# Patient Record
Sex: FEMALE | Race: WHITE | NOT HISPANIC OR LATINO | Employment: UNEMPLOYED | URBAN - NONMETROPOLITAN AREA
[De-identification: names, ages, dates, MRNs, and addresses within clinical notes are randomized per-mention and may not be internally consistent; named-entity substitution may affect disease eponyms.]

---

## 2017-01-01 ENCOUNTER — HOSPITAL ENCOUNTER (OUTPATIENT)
Dept: SPEECH THERAPY | Facility: HOSPITAL | Age: 42
Setting detail: THERAPIES SERIES
Discharge: HOME | End: 2017-01-20
Attending: PHYSICAL MEDICINE & REHABILITATION | Admitting: PHYSICAL MEDICINE & REHABILITATION

## 2017-01-24 ENCOUNTER — TRANSCRIBE ORDERS (OUTPATIENT)
Dept: SPEECH THERAPY | Facility: HOSPITAL | Age: 42
End: 2017-01-24

## 2017-01-24 DIAGNOSIS — R41.844 FRONTAL LOBE AND EXECUTIVE FUNCTION DEFICIT: Primary | ICD-10-CM

## 2017-02-08 ENCOUNTER — HOSPITAL ENCOUNTER (OUTPATIENT)
Dept: SPEECH THERAPY | Facility: HOSPITAL | Age: 42
Setting detail: THERAPIES SERIES
Discharge: HOME | End: 2017-02-08

## 2017-02-08 DIAGNOSIS — R41.841 COGNITIVE COMMUNICATION DEFICIT: Primary | ICD-10-CM

## 2017-02-08 PROCEDURE — 92507 TX SP LANG VOICE COMM INDIV: CPT | Performed by: SPEECH-LANGUAGE PATHOLOGIST

## 2017-02-08 NOTE — PROGRESS NOTES
Outpatient Speech Language Pathology   Adult Speech Language Cognitive Treatment Note  [facility]     Patient Name: Christal Holder  : 1975  MRN: [id number]  Today's Date: 2017         Visit Date: 2017   There is no problem list on file for this patient.    Pt is making consistent progress towards all LTG and STGs. 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 Continue PO to target memory and attention goals as stated.        Visit Dx:    ICD-10-CM ICD-9-CM   1. Cognitive communication deficit R41.841 799.52                               SLP OP Goals       17 1427 17 1020 17 0900    Goal Type Needed    Goal Type Needed  Memory;Attention/Orientation  -EA Memory;Attention/Orientation;Other Adult Goals  -EA    Subjective Comments    Subjective Comments  Pt attended therapy session alone this date. Pt was alert and active in all therapy activities. Pt stated that she continues to struggle remembering to complete tasks at home. Schedule/chart was recommended this date as well as phone calendar and reminders.,   -EA     Subjective Pain    Able to rate subjective pain?  yes  -EA     Pre-Treatment Pain Level  0  -EA     Post-Treatment Pain Level  0  -EA     Memory Goals    Memory LTG's  Patient and family will implement compensatory strategies to maximize patient’s Memory function so patient can continue to participate in daily activities  -EA Patient and family will implement compensatory strategies to maximize patient’s Memory function so patient can continue to participate in daily activities  -EA    Patient and family will implement compensatory strategies to maximize patient’s Memory function so patient can continue to participate in daily activities  90%:  -EA 90%:  -EA    Status: Patient and family will implement compensatory strategies to maximize patient’s Memory function so patient can  continue to participate in daily activities  Progressing as expected  -EA New  -EA    Memory STG's  Patient’s memory skills will be enhanced as reported by patient by using external memory aides   Pt will utilize alarm/reminders on phone; calendar; planner  -EA Patient’s memory skills will be enhanced as reported by patient by using external memory aides   Pt will utilize alarm/reminders on phone; calendar; planner  -EA    Patient’s memory skills will be enhanced as reported by patient by using external memory aides  90%:  -EA 90%:  -EA    Status: Patient’s memory skills will be enhanced as reported by patient by using external memory aides  Progressing as expected  -EA New  -EA    Comments: Patient’s memory skills will be enhanced as reported by patient by using external memory aides  70% - Pt stated that she forgot to use calendar this week for appointments and was an hour late to previous appointment. Education was provided on the importance of using compensatory strategy tools for memory task. Pt was prompted to add therapy appointment to phone with a reminder before leaving therapy. Pt added upcoming appointments to phone.    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 Pt required talk through of problems and association strategies.   -EA     Attention/Orientation STG's  Patient will improve attention skills by sustaining focus to high-level cognitive tasks in order to complete task  -EA Patient will improve attention skills by sustaining focus to high-level cognitive tasks in order to complete task  -EA    Patient will improve attention skills by sustaining focus to high-level cognitive tasks in order to complete task  90%:  -EA 90%:  -EA    Status: Patient will improve attention skills by sustaining focus to high-level cognitive tasks in order to complete task  Progressing as expected  -EA New  -EA    Comments: Patient will improve attention skills by sustaining focus to high-level cognitive tasks in order to complete task  70% - Pt completed attention task with 70% accuracy. Pt got off task numerous times throughout session and forgot what she was saying requiring prompts and cues.   -EA     Other Goals    Other Adult Goal- 1  Patirent will determine functional tasks at home that she wishes to address in therapy sessions as part of the home exercise program to improve generalization of attention and memory strategies independently.  -EA Patirent will determine functional tasks at home that she wishes to address in therapy sessions as part of the home exercise program to improve generalization of attention and memory strategies independently.  -EA    Status: Other Adult Goal- 1  Progressing as expected  -EA New  -EA    Comments: Other Adult Goal- 1  Pt had difficulties this past week with time management; attention to task; and memory of task asked by . It was recommened that patient use phone reminders (pt was prompted to put appointments in phone before she left therapy sessions) . It was also recommended that pt make a schedule/chart for home of chores and task that need to be complete. Pt  will also ask  to write tasks he needs completed in notebook that will be placed beside his bed to help task completion.   -EA     SLP Time Calculation    SLP Goal Re-Cert Due Date 02/18/17  -EA 02/18/16  -EA 02/18/17  -EA      User Key  (r) = Recorded By, (t) = Taken By, (c) = Cosigned By    Initials Name Provider Type    YULY Mari Lott MS CCC-SLP Speech and Language Pathologist                OP SLP Education       02/08/17 1400          Education    Barriers to Learning No barriers identified  -EA      Education Provided Patient demonstrated recommended strategies;Family/caregivers require further education on strategies, risks  -EA      Assessed Learning needs  -EA      Learning Motivation Strong  -EA      Learning Method Explanation;Demonstration  -EA      Teaching Response Verbalized understanding;Demonstrated understanding  -EA        User Key  (r) = Recorded By, (t) = Taken By, (c) = Cosigned By    Initials Name Effective Dates    YULY Mari Lott MS CCC-SLP 10/17/16 -                 OP SLP Assessment/Plan - 02/08/17 1400     SLP Assessment    Functional Problems Speech Language- Adult/Cognition  -EA    Impact on Function: Adult Speech Language/Cognition Restrictions in personal and social life  -EA    Clinical Impression: Speech Language-Adult/Congnition Cognitive Communication Impairment  -EA    Functional Problems Comment Pt struggles to complete household task due to memory; attention; and planning.   -EA    Clinical Impression Comments Pt is making consistent progress towards all goals. Pt continues to require models, prompts, and cues for successful use of strategies.   -EA    SLP Diagnosis Cognitive Communication Impairment   -EA    Prognosis Good (comment)  -EA    Patient/caregiver participated in establishment of treatment plan and goals Yes  -EA    Patient would benefit from skilled therapy intervention Yes  -EA    SLP Plan    Frequency 1 time a week   -EA    Duration 4 weeks   -EA    Planned CPT's? SLP INDIVIDUAL SPEECH THERAPY: 23875  -EA    Expected Duration Therapy Session (min) 30-45 minutes  -EA    Plan Comments Continue POC  -EA      User Key  (r) = Recorded By, (t) = Taken By, (c) = Cosigned By    Initials Name Provider Type    YULY Lott MS CCC-SLP Speech and Language Pathologist                 Time Calculation:   SLP Start Time: 1020  SLP Stop Time: 1112  SLP Time Calculation (min): 52 min  Total Timed Code Minutes- SLP: 52 minute(s)    Therapy Charges for Today     Code Description Service Date Service Provider Modifiers Qty    [id number]  ST TREATMENT SPEECH 3 2/8/2017 Mari Lott MS CCC-SLP GN 1                   Mari Lott MS CCC-SLP  2/8/2017

## 2017-02-22 ENCOUNTER — HOSPITAL ENCOUNTER (OUTPATIENT)
Dept: SPEECH THERAPY | Facility: HOSPITAL | Age: 42
Setting detail: THERAPIES SERIES
Discharge: HOME | End: 2017-02-22

## 2017-02-22 DIAGNOSIS — R41.841 COGNITIVE COMMUNICATION DEFICIT: Primary | ICD-10-CM

## 2017-02-22 PROCEDURE — G9167 ATTEN D/C STATUS: HCPCS | Performed by: SPEECH-LANGUAGE PATHOLOGIST

## 2017-02-22 PROCEDURE — G9166 ATTEN GOAL STATUS: HCPCS | Performed by: SPEECH-LANGUAGE PATHOLOGIST

## 2017-02-22 PROCEDURE — 92507 TX SP LANG VOICE COMM INDIV: CPT | Performed by: SPEECH-LANGUAGE PATHOLOGIST

## 2017-02-22 PROCEDURE — G9165 ATTEN CURRENT STATUS: HCPCS | Performed by: SPEECH-LANGUAGE PATHOLOGIST

## 2017-02-22 NOTE — THERAPY DISCHARGE NOTE
Speech Language Pathology Discharge Summary  [facility]       Patient Name: Christal Holder  : 1975  MRN: [id number]    Today's Date: 2017     Pt has attended 2 of 4 scheduled therapy sessions over this re-certification period. Pt has met 4/5 goals. Pt is independently using compensatory strategies and requires minimal to no prompts and cues from SLP. Pt completed a brief cognitive assessment this date and received 49/50 which addressed areas of attention; orientation; immediate and delayed recall; visuospatial; executive function; language; abstractions; and story recognition. Pt has met fully potential and is ready for discharge from speech therapy. Pt was hesitant but was in agreement with testing scores and independent use of compensatory strategies. Pt verbalized understanding and was in agreement to use home strategies and continue use of [employer] luisito to continue cognitive exercises.           SLP OP Goals       17 1020          Goal Type Needed    Goal Type Needed Memory;Attention/Orientation  -EA      Subjective Comments    Subjective Comments Pt attended therpay session alone. Pt was using planner to keep track of appointment times and alarm on phone as a reminder. Pt missed 2 out of the 4 therapy sessions since last certification. Pt was given a brief cognitive assessment tool this date and received 49/50. Areas addressed include orientation, memory, attention, abstractions, language, visuospatial, executive function, delayed and immediate recall.    -EA      Subjective Pain    Able to rate subjective pain? yes  -EA      Pre-Treatment Pain Level 4  -EA      Post-Treatment Pain Level 4  -EA      Subjective Pain Comment Pt states she is in constant pain in her back and arm since her accident.   -EA      Memory Goals    Memory LTG's Patient and family will implement compensatory strategies to maximize patient’s Memory function so patient can continue to participate in daily  activities  -EA      Patient and family will implement compensatory strategies to maximize patient’s Memory function so patient can continue to participate in daily activities 90%:  -EA      Status: Patient and family will implement compensatory strategies to maximize patient’s Memory function so patient can continue to participate in daily activities Progressing as expected;Achieved  -EA      Comments: Patient and family will implement compensatory strategies to maximize patient’s Memory function so patient can continue to participate in daily activities 90% - Pt consistently uses calendar, reminders, and alarms at home to help improve memory for daily activities. Pt states that she continues to have difficulties on memory tasks at home. SLP educated pt on using strategies to decrease times of memory deficits. Pt states she has trouble remembering to bring insurance card to appointments (she did not write it down or use planner) When pt uses planner or list she has less diffiuclty. Pt has cognitive ability to use strategies but it is now up to her to follow through with this strategies in the home environment.   -EA      Memory STG's Patient’s memory skills will be enhanced as reported by patient by using external memory aides   Pt will utilize alarm/reminders on phone; calendar; planner  -EA      Patient’s memory skills will be enhanced as reported by patient by using external memory aides 90%:  -EA      Status: Patient’s memory skills will be enhanced as reported by patient by using external memory aides Progressing as expected;Discontinued  -EA      Comments: Patient’s memory skills will be enhanced as reported by patient by using external memory aides 80% - Pt has the functional cognitive abilities to use strategies independently. However, at times pt does not follow through with recommended strategies. Pt is also taking pain medication that can effect desire and cognitive functions.   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 Pt required extended time to complete tasks independently but was able to accurately complete tasks.   -EA      Attention/Orientation STG's Patient will improve attention skills by sustaining focus to high-level cognitive tasks in order to complete task  -EA      Patient will improve attention skills by sustaining focus to high-level cognitive tasks in order to complete task 90%:  -EA      Status: Patient will improve attention skills by sustaining focus to high-level cognitive tasks in order to complete task Progressing as expected  -EA      Comments: Patient will improve attention skills by sustaining focus to high-level cognitive tasks in order to complete task 75% - Pt does continue to require minimal prompts for attention tasks. However, pt complete attention task tis date with no prompts or cues from SLP. Pt self corrected and redirected throughout task and was able to complete tasks independently with the use of extended time.   -EA      Other Goals    Other Adult Goal- 1 Patirent will determine functional tasks at home that she wishes to address in therapy sessions as part of the home exercise program to improve generalization of attention and memory strategies independently.  -EA      Status: Other Adult Goal- 1 Progressing as  expected  -EA      Comments: Other Adult Goal- 1 Pt continues to complain about time management and organizational skills. Pt stated that it is difficult to complete home tasks due to pain and she is unable to complete tasks in a timely manner. SLP recommended use of schedule to help herself stay focues and follow through with tasks at home. SLP educated pt on strategies such as use of calendar; lists; alarms; and reminders on phone. SLP also educated pt on continuing cognitive exercises on [employer] luisito she has on her phone to keep cognitive fucntioning at current level.    -EA      SLP Time Calculation    SLP Goal Re-Cert Due Date 03/24/17  -EA        User Key  (r) = Recorded By, (t) = Taken By, (c) = Cosigned By    Initials Name Provider Type    YULY Lott MS CCC-SLP Speech and Language Pathologist          OP SLP Discharge Summary  Date of Discharge: 02/22/17  Reason for Discharge: Maximum functional potential achieved, Independent  Outcomes Achieved: Patient able to partially acheive established goals (Pt met 4/5 goals. Pt is independently using compensatory strategies. )  Discharge Destination: Home without follow-up, Home with home program (SLP educated pt on continues use of compensatory strategies and use of the [employer] luisito on phone. )  Discharge Instructions: Continue to use [employer] luisito for cognitive exercises and continue to use compensatory strategies; phone reminders; calendar; lists; alarms      Time Calculation:        Therapy Charges for Today     Code Description Service Date Service Provider Modifiers Qty    [id number] HC ST ATTENTION CURRENT 2/22/2017 Mari Lott MS CCC-SLP GN, CI 1    [id number] HC ST ATTENTION PROJECTED 2/22/2017 Mari Lott MS CCC-SLP GN, CI 1    [id number] HC ST ATTENTION DISCHARGE 2/22/2017 MS BANDAR RojasSLP GN, CI 1    [id number] HC ST TREATMENT SPEECH 4 2/22/2017 MS RICHARD Rojas GN 1                  Mari  MARCELLA Lott MS CCC-SLP  2/22/2017

## 2017-04-13 ENCOUNTER — TRANSCRIBE ORDERS (OUTPATIENT)
Dept: PHYSICAL THERAPY | Facility: HOSPITAL | Age: 42
End: 2017-04-13

## 2017-04-13 DIAGNOSIS — M75.01 ADHESIVE BURSITIS OF RIGHT SHOULDER: ICD-10-CM

## 2017-04-13 DIAGNOSIS — M77.8 TENDINITIS OF RIGHT SHOULDER: ICD-10-CM

## 2017-04-13 DIAGNOSIS — M75.51 BURSITIS OF RIGHT SHOULDER: Primary | ICD-10-CM

## 2017-04-18 ENCOUNTER — APPOINTMENT (OUTPATIENT)
Dept: PHYSICAL THERAPY | Facility: HOSPITAL | Age: 42
End: 2017-04-18

## 2017-04-25 ENCOUNTER — HOSPITAL ENCOUNTER (OUTPATIENT)
Dept: PHYSICAL THERAPY | Facility: HOSPITAL | Age: 42
Setting detail: THERAPIES SERIES
Discharge: HOME | End: 2017-04-25

## 2017-04-25 DIAGNOSIS — M77.8 TENDINITIS OF RIGHT SHOULDER: ICD-10-CM

## 2017-04-25 DIAGNOSIS — M75.01 ADHESIVE BURSITIS OF RIGHT SHOULDER: ICD-10-CM

## 2017-04-25 DIAGNOSIS — M75.51 BURSITIS OF RIGHT SHOULDER: Primary | ICD-10-CM

## 2017-04-25 PROCEDURE — 97162 PT EVAL MOD COMPLEX 30 MIN: CPT | Performed by: PHYSICAL THERAPIST
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
## 2017-04-27 ENCOUNTER — HOSPITAL ENCOUNTER (OUTPATIENT)
Dept: PHYSICAL THERAPY | Facility: HOSPITAL | Age: 42
Setting detail: THERAPIES SERIES
Discharge: HOME | End: 2017-04-27

## 2017-04-27 DIAGNOSIS — M77.8 TENDINITIS OF RIGHT SHOULDER: ICD-10-CM

## 2017-04-27 DIAGNOSIS — M75.51 BURSITIS OF RIGHT SHOULDER: Primary | ICD-10-CM

## 2017-04-27 DIAGNOSIS — M75.01 ADHESIVE BURSITIS OF RIGHT SHOULDER: ICD-10-CM

## 2017-04-27 PROCEDURE — 97110 THERAPEUTIC EXERCISES: CPT | Performed by: PHYSICAL THERAPIST
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
## 2017-05-02 ENCOUNTER — HOSPITAL ENCOUNTER (OUTPATIENT)
Dept: PHYSICAL THERAPY | Facility: HOSPITAL | Age: 42
Setting detail: THERAPIES SERIES
Discharge: HOME | End: 2017-05-02

## 2017-05-02 DIAGNOSIS — M75.01 ADHESIVE BURSITIS OF RIGHT SHOULDER: ICD-10-CM

## 2017-05-02 DIAGNOSIS — M77.8 TENDINITIS OF RIGHT SHOULDER: ICD-10-CM

## 2017-05-02 DIAGNOSIS — M75.51 BURSITIS OF RIGHT SHOULDER: Primary | ICD-10-CM

## 2017-05-02 PROCEDURE — 97140 MANUAL THERAPY 1/> REGIONS: CPT | Performed by: PHYSICAL THERAPIST

## 2017-05-04 ENCOUNTER — HOSPITAL ENCOUNTER (OUTPATIENT)
Dept: PHYSICAL THERAPY | Facility: HOSPITAL | Age: 42
Setting detail: THERAPIES SERIES
Discharge: HOME | End: 2017-05-04

## 2017-05-04 DIAGNOSIS — M75.01 ADHESIVE BURSITIS OF RIGHT SHOULDER: ICD-10-CM

## 2017-05-04 DIAGNOSIS — M77.8 TENDINITIS OF RIGHT SHOULDER: ICD-10-CM

## 2017-05-04 DIAGNOSIS — M75.51 BURSITIS OF RIGHT SHOULDER: Primary | ICD-10-CM

## 2017-05-04 PROCEDURE — 97110 THERAPEUTIC EXERCISES: CPT | Performed by: PHYSICAL THERAPIST

## 2017-05-09 ENCOUNTER — HOSPITAL ENCOUNTER (OUTPATIENT)
Dept: PHYSICAL THERAPY | Facility: HOSPITAL | Age: 42
Setting detail: THERAPIES SERIES
Discharge: HOME | End: 2017-05-09

## 2017-05-09 DIAGNOSIS — M77.8 TENDINITIS OF RIGHT SHOULDER: ICD-10-CM

## 2017-05-09 DIAGNOSIS — M75.51 BURSITIS OF RIGHT SHOULDER: Primary | ICD-10-CM

## 2017-05-09 DIAGNOSIS — M75.01 ADHESIVE BURSITIS OF RIGHT SHOULDER: ICD-10-CM

## 2017-05-09 PROCEDURE — 97110 THERAPEUTIC EXERCISES: CPT | Performed by: PHYSICAL THERAPIST

## 2017-05-11 ENCOUNTER — HOSPITAL ENCOUNTER (OUTPATIENT)
Dept: PHYSICAL THERAPY | Facility: HOSPITAL | Age: 42
Setting detail: THERAPIES SERIES
Discharge: HOME | End: 2017-05-11

## 2017-05-11 DIAGNOSIS — M75.51 BURSITIS OF RIGHT SHOULDER: Primary | ICD-10-CM

## 2017-05-11 DIAGNOSIS — M75.01 ADHESIVE BURSITIS OF RIGHT SHOULDER: ICD-10-CM

## 2017-05-11 DIAGNOSIS — M77.8 TENDINITIS OF RIGHT SHOULDER: ICD-10-CM

## 2017-05-11 PROCEDURE — 97110 THERAPEUTIC EXERCISES: CPT | Performed by: PHYSICAL THERAPIST

## 2017-05-16 ENCOUNTER — HOSPITAL ENCOUNTER (OUTPATIENT)
Dept: PHYSICAL THERAPY | Facility: HOSPITAL | Age: 42
Setting detail: THERAPIES SERIES
Discharge: HOME | End: 2017-05-16

## 2017-05-16 DIAGNOSIS — M75.51 BURSITIS OF RIGHT SHOULDER: Primary | ICD-10-CM

## 2017-05-16 DIAGNOSIS — M77.8 TENDINITIS OF RIGHT SHOULDER: ICD-10-CM

## 2017-05-16 DIAGNOSIS — M75.01 ADHESIVE BURSITIS OF RIGHT SHOULDER: ICD-10-CM

## 2017-05-16 PROCEDURE — 97110 THERAPEUTIC EXERCISES: CPT | Performed by: PHYSICAL THERAPIST

## 2017-05-18 ENCOUNTER — HOSPITAL ENCOUNTER (OUTPATIENT)
Dept: PHYSICAL THERAPY | Facility: HOSPITAL | Age: 42
Setting detail: THERAPIES SERIES
Discharge: HOME | End: 2017-05-18

## 2017-05-18 DIAGNOSIS — M75.01 ADHESIVE BURSITIS OF RIGHT SHOULDER: ICD-10-CM

## 2017-05-18 DIAGNOSIS — M75.51 BURSITIS OF RIGHT SHOULDER: Primary | ICD-10-CM

## 2017-05-18 DIAGNOSIS — M77.8 TENDINITIS OF RIGHT SHOULDER: ICD-10-CM

## 2017-05-18 PROCEDURE — 97110 THERAPEUTIC EXERCISES: CPT | Performed by: PHYSICAL THERAPIST

## 2017-09-18 ENCOUNTER — HOSPITAL ENCOUNTER (INPATIENT)
Facility: HOSPITAL | Age: 42
LOS: 2 days | Discharge: HOME | End: 2017-09-20
Attending: PSYCHIATRY & NEUROLOGY | Admitting: PSYCHIATRY & NEUROLOGY

## 2017-09-18 ENCOUNTER — HOSPITAL ENCOUNTER (EMERGENCY)
Facility: HOSPITAL | Age: 42
Discharge: PSYCHIATRIC OR SUBSTANCE USE FACILITY | End: 2017-09-18
Attending: FAMILY MEDICINE | Admitting: FAMILY MEDICINE

## 2017-09-18 VITALS
SYSTOLIC BLOOD PRESSURE: 127 MMHG | HEIGHT: 61 IN | HEART RATE: 76 BPM | BODY MASS INDEX: 29.27 KG/M2 | RESPIRATION RATE: 18 BRPM | WEIGHT: 155 LBS | TEMPERATURE: 98.1 F | OXYGEN SATURATION: 99 % | DIASTOLIC BLOOD PRESSURE: 79 MMHG

## 2017-09-18 DIAGNOSIS — R45.851 SUICIDAL IDEATION: Primary | ICD-10-CM

## 2017-09-18 DIAGNOSIS — F33.1 MODERATE EPISODE OF RECURRENT MAJOR DEPRESSIVE DISORDER (HCC): ICD-10-CM

## 2017-09-18 DIAGNOSIS — Z78.9 IMPAIRED MOBILITY AND ADLS: ICD-10-CM

## 2017-09-18 DIAGNOSIS — Z74.09 IMPAIRED MOBILITY AND ADLS: ICD-10-CM

## 2017-09-18 LAB
ALBUMIN SERPL-MCNC: 4.5 G/DL (ref 3.4–4.8)
ALBUMIN/GLOB SERPL: 1.6 G/DL (ref 1.1–1.8)
ALP SERPL-CCNC: 82 U/L (ref 38–126)
ALT SERPL W P-5'-P-CCNC: 35 U/L (ref 9–52)
AMPHET+METHAMPHET UR QL: POSITIVE
ANION GAP SERPL CALCULATED.3IONS-SCNC: 11 MMOL/L (ref 5–15)
AST SERPL-CCNC: 20 U/L (ref 14–36)
BARBITURATES UR QL SCN: NEGATIVE
BASOPHILS # BLD AUTO: 0.03 10*3/MM3 (ref 0–0.2)
BASOPHILS NFR BLD AUTO: 0.3 % (ref 0–2)
BENZODIAZ UR QL SCN: POSITIVE
BILIRUB SERPL-MCNC: 0.7 MG/DL (ref 0.2–1.3)
BUN BLD-MCNC: 7 MG/DL (ref 7–21)
BUN/CREAT SERPL: 9.6 (ref 7–25)
CALCIUM SPEC-SCNC: 9.4 MG/DL (ref 8.4–10.2)
CANNABINOIDS SERPL QL: NEGATIVE
CHLORIDE SERPL-SCNC: 102 MMOL/L (ref 95–110)
CO2 SERPL-SCNC: 27 MMOL/L (ref 22–31)
COCAINE UR QL: NEGATIVE
CREAT BLD-MCNC: 0.73 MG/DL (ref 0.5–1)
DEPRECATED RDW RBC AUTO: 44.4 FL (ref 36.4–46.3)
EOSINOPHIL # BLD AUTO: 0.12 10*3/MM3 (ref 0–0.7)
EOSINOPHIL NFR BLD AUTO: 1.3 % (ref 0–7)
ERYTHROCYTE [DISTWIDTH] IN BLOOD BY AUTOMATED COUNT: 13.7 % (ref 11.5–14.5)
ETHANOL BLD-MCNC: <10 MG/DL (ref 0–10)
ETHANOL UR QL: <0.01 %
GFR SERPL CREATININE-BSD FRML MDRD: 89 ML/MIN/1.73 (ref 64–149)
GLOBULIN UR ELPH-MCNC: 2.9 GM/DL (ref 2.3–3.5)
GLUCOSE BLD-MCNC: 124 MG/DL (ref 60–100)
HCG SERPL QL: NEGATIVE
HCT VFR BLD AUTO: 41.3 % (ref 35–45)
HGB BLD-MCNC: 13.4 G/DL (ref 12–15.5)
HOLD SPECIMEN: NORMAL
HOLD SPECIMEN: NORMAL
IMM GRANULOCYTES # BLD: 0.02 10*3/MM3 (ref 0–0.02)
IMM GRANULOCYTES NFR BLD: 0.2 % (ref 0–0.5)
LYMPHOCYTES # BLD AUTO: 2.13 10*3/MM3 (ref 0.6–4.2)
LYMPHOCYTES NFR BLD AUTO: 23.6 % (ref 10–50)
MCH RBC QN AUTO: 28.6 PG (ref 26.5–34)
MCHC RBC AUTO-ENTMCNC: 32.4 G/DL (ref 31.4–36)
MCV RBC AUTO: 88.2 FL (ref 80–98)
METHADONE UR QL SCN: NEGATIVE
MONOCYTES # BLD AUTO: 0.48 10*3/MM3 (ref 0–0.9)
MONOCYTES NFR BLD AUTO: 5.3 % (ref 0–12)
NEUTROPHILS # BLD AUTO: 6.25 10*3/MM3 (ref 2–8.6)
NEUTROPHILS NFR BLD AUTO: 69.3 % (ref 37–80)
OPIATES UR QL: NEGATIVE
OXYCODONE UR QL SCN: POSITIVE
PLATELET # BLD AUTO: 287 10*3/MM3 (ref 150–450)
PMV BLD AUTO: 11.2 FL (ref 8–12)
POTASSIUM BLD-SCNC: 3.8 MMOL/L (ref 3.5–5.1)
PROT SERPL-MCNC: 7.4 G/DL (ref 6.3–8.6)
RBC # BLD AUTO: 4.68 10*6/MM3 (ref 3.77–5.16)
SODIUM BLD-SCNC: 140 MMOL/L (ref 137–145)
T4 FREE SERPL-MCNC: 1.32 NG/DL (ref 0.78–2.19)
TSH SERPL DL<=0.05 MIU/L-ACNC: 1.33 MIU/ML (ref 0.46–4.68)
WBC NRBC COR # BLD: 9.03 10*3/MM3 (ref 3.2–9.8)
WHOLE BLOOD HOLD SPECIMEN: NORMAL
WHOLE BLOOD HOLD SPECIMEN: NORMAL

## 2017-09-18 RX ORDER — OXYCODONE AND ACETAMINOPHEN 7.5; 325 MG/1; MG/1
1 TABLET ORAL EVERY 6 HOURS PRN
Status: DISCONTINUED | OUTPATIENT
Start: 2017-09-18 | End: 2017-09-20 | Stop reason: HOSPADM

## 2017-09-18 RX ORDER — TRAZODONE HYDROCHLORIDE 50 MG/1
50 TABLET ORAL NIGHTLY PRN
Status: DISCONTINUED | OUTPATIENT
Start: 2017-09-18 | End: 2017-09-20 | Stop reason: HOSPADM

## 2017-09-18 RX ORDER — BUDESONIDE 0.5 MG/2ML
0.25 INHALANT ORAL
Status: DISCONTINUED | OUTPATIENT
Start: 2017-09-18 | End: 2017-09-20 | Stop reason: HOSPADM

## 2017-09-18 RX ORDER — ACETAMINOPHEN 325 MG/1
650 TABLET ORAL EVERY 4 HOURS PRN
Status: DISCONTINUED | OUTPATIENT
Start: 2017-09-18 | End: 2017-09-18

## 2017-09-18 RX ORDER — LOPERAMIDE HYDROCHLORIDE 2 MG/1
2 CAPSULE ORAL 4 TIMES DAILY PRN
Status: DISCONTINUED | OUTPATIENT
Start: 2017-09-18 | End: 2017-09-20 | Stop reason: HOSPADM

## 2017-09-18 RX ORDER — ALUMINA, MAGNESIA, AND SIMETHICONE 2400; 2400; 240 MG/30ML; MG/30ML; MG/30ML
15 SUSPENSION ORAL EVERY 6 HOURS PRN
Status: DISCONTINUED | OUTPATIENT
Start: 2017-09-18 | End: 2017-09-20 | Stop reason: HOSPADM

## 2017-09-18 RX ORDER — ALBUTEROL SULFATE 90 UG/1
2 AEROSOL, METERED RESPIRATORY (INHALATION) EVERY 4 HOURS PRN
COMMUNITY

## 2017-09-18 RX ORDER — NAPROXEN 500 MG/1
500 TABLET ORAL 2 TIMES DAILY WITH MEALS
Status: DISCONTINUED | OUTPATIENT
Start: 2017-09-18 | End: 2017-09-20 | Stop reason: HOSPADM

## 2017-09-18 RX ORDER — ALBUTEROL SULFATE 2.5 MG/3ML
2.5 SOLUTION RESPIRATORY (INHALATION) EVERY 6 HOURS PRN
Status: DISCONTINUED | OUTPATIENT
Start: 2017-09-18 | End: 2017-09-20 | Stop reason: HOSPADM

## 2017-09-18 RX ORDER — OXYCODONE AND ACETAMINOPHEN 7.5; 325 MG/1; MG/1
1 TABLET ORAL EVERY 6 HOURS PRN
COMMUNITY

## 2017-09-18 RX ORDER — HYDROXYZINE PAMOATE 50 MG/1
50 CAPSULE ORAL EVERY 6 HOURS PRN
Status: DISCONTINUED | OUTPATIENT
Start: 2017-09-18 | End: 2017-09-20 | Stop reason: HOSPADM

## 2017-09-18 RX ORDER — DULOXETIN HYDROCHLORIDE 60 MG/1
60 CAPSULE, DELAYED RELEASE ORAL DAILY
COMMUNITY

## 2017-09-18 RX ORDER — DIAZEPAM 10 MG/1
10 TABLET ORAL 2 TIMES DAILY PRN
COMMUNITY
End: 2017-09-20 | Stop reason: HOSPADM

## 2017-09-18 RX ORDER — NABUMETONE 750 MG/1
750 TABLET, FILM COATED ORAL 2 TIMES DAILY
COMMUNITY

## 2017-09-18 RX ADMIN — OXYCODONE HYDROCHLORIDE AND ACETAMINOPHEN 1 TABLET: 7.5; 325 TABLET ORAL at 17:18

## 2017-09-18 RX ADMIN — TRAZODONE HYDROCHLORIDE 50 MG: 50 TABLET ORAL at 22:15

## 2017-09-19 PROBLEM — F33.2 SEVERE EPISODE OF RECURRENT MAJOR DEPRESSIVE DISORDER, WITHOUT PSYCHOTIC FEATURES (HCC): Status: ACTIVE | Noted: 2017-09-19

## 2017-09-19 LAB
ALBUMIN SERPL-MCNC: 3.7 G/DL (ref 3.4–4.8)
ALBUMIN/GLOB SERPL: 1.4 G/DL (ref 1.1–1.8)
ALP SERPL-CCNC: 72 U/L (ref 38–126)
ALT SERPL W P-5'-P-CCNC: 30 U/L (ref 9–52)
ANION GAP SERPL CALCULATED.3IONS-SCNC: 6 MMOL/L (ref 5–15)
AST SERPL-CCNC: 16 U/L (ref 14–36)
BILIRUB SERPL-MCNC: 0.4 MG/DL (ref 0.2–1.3)
BUN BLD-MCNC: 9 MG/DL (ref 7–21)
BUN/CREAT SERPL: 11.3 (ref 7–25)
CALCIUM SPEC-SCNC: 9 MG/DL (ref 8.4–10.2)
CHLORIDE SERPL-SCNC: 102 MMOL/L (ref 95–110)
CO2 SERPL-SCNC: 32 MMOL/L (ref 22–31)
CREAT BLD-MCNC: 0.8 MG/DL (ref 0.5–1)
GFR SERPL CREATININE-BSD FRML MDRD: 80 ML/MIN/1.73 (ref 64–149)
GLOBULIN UR ELPH-MCNC: 2.6 GM/DL (ref 2.3–3.5)
GLUCOSE BLD-MCNC: 92 MG/DL (ref 60–100)
GLUCOSE BLDC GLUCOMTR-MCNC: 101 MG/DL (ref 70–130)
GLUCOSE BLDC GLUCOMTR-MCNC: 107 MG/DL (ref 70–130)
GLUCOSE BLDC GLUCOMTR-MCNC: 93 MG/DL (ref 70–130)
POTASSIUM BLD-SCNC: 4.1 MMOL/L (ref 3.5–5.1)
PROT SERPL-MCNC: 6.3 G/DL (ref 6.3–8.6)
SODIUM BLD-SCNC: 140 MMOL/L (ref 137–145)
WHOLE BLOOD HOLD SPECIMEN: NORMAL

## 2017-09-19 PROCEDURE — 97530 THERAPEUTIC ACTIVITIES: CPT

## 2017-09-19 PROCEDURE — 82962 GLUCOSE BLOOD TEST: CPT

## 2017-09-19 PROCEDURE — 97165 OT EVAL LOW COMPLEX 30 MIN: CPT

## 2017-09-19 PROCEDURE — 90791 PSYCH DIAGNOSTIC EVALUATION: CPT | Performed by: PSYCHIATRY & NEUROLOGY

## 2017-09-19 PROCEDURE — G8987 SELF CARE CURRENT STATUS: HCPCS

## 2017-09-19 PROCEDURE — 99232 SBSQ HOSP IP/OBS MODERATE 35: CPT | Performed by: FAMILY MEDICINE

## 2017-09-19 PROCEDURE — 94799 UNLISTED PULMONARY SVC/PX: CPT

## 2017-09-19 PROCEDURE — 80053 COMPREHEN METABOLIC PANEL: CPT | Performed by: PSYCHIATRY & NEUROLOGY

## 2017-09-19 PROCEDURE — G8988 SELF CARE GOAL STATUS: HCPCS

## 2017-09-19 RX ORDER — NICOTINE POLACRILEX 4 MG
15 LOZENGE BUCCAL
Status: DISCONTINUED | OUTPATIENT
Start: 2017-09-19 | End: 2017-09-20 | Stop reason: HOSPADM

## 2017-09-19 RX ORDER — DEXTROSE MONOHYDRATE 25 G/50ML
25 INJECTION, SOLUTION INTRAVENOUS
Status: DISCONTINUED | OUTPATIENT
Start: 2017-09-19 | End: 2017-09-20 | Stop reason: HOSPADM

## 2017-09-19 RX ORDER — DULOXETIN HYDROCHLORIDE 60 MG/1
60 CAPSULE, DELAYED RELEASE ORAL DAILY
Status: DISCONTINUED | OUTPATIENT
Start: 2017-09-19 | End: 2017-09-20 | Stop reason: HOSPADM

## 2017-09-19 RX ADMIN — DULOXETINE 60 MG: 60 CAPSULE, DELAYED RELEASE ORAL at 13:17

## 2017-09-19 RX ADMIN — NAPROXEN 500 MG: 500 TABLET ORAL at 08:05

## 2017-09-19 RX ADMIN — NAPROXEN 500 MG: 500 TABLET ORAL at 17:48

## 2017-09-19 RX ADMIN — OXYCODONE HYDROCHLORIDE AND ACETAMINOPHEN 1 TABLET: 7.5; 325 TABLET ORAL at 13:58

## 2017-09-19 RX ADMIN — OXYCODONE HYDROCHLORIDE AND ACETAMINOPHEN 1 TABLET: 7.5; 325 TABLET ORAL at 08:05

## 2017-09-19 RX ADMIN — BUDESONIDE 0.25 MG: 0.5 INHALANT RESPIRATORY (INHALATION) at 19:49

## 2017-09-19 RX ADMIN — METFORMIN HYDROCHLORIDE 500 MG: 500 TABLET ORAL at 17:49

## 2017-09-19 RX ADMIN — OXYCODONE HYDROCHLORIDE AND ACETAMINOPHEN 1 TABLET: 7.5; 325 TABLET ORAL at 21:17

## 2017-09-19 RX ADMIN — METFORMIN HYDROCHLORIDE 500 MG: 500 TABLET ORAL at 08:05

## 2017-09-20 VITALS
HEART RATE: 87 BPM | SYSTOLIC BLOOD PRESSURE: 104 MMHG | WEIGHT: 157.19 LBS | RESPIRATION RATE: 18 BRPM | OXYGEN SATURATION: 99 % | DIASTOLIC BLOOD PRESSURE: 62 MMHG | TEMPERATURE: 95.8 F | BODY MASS INDEX: 28.93 KG/M2 | HEIGHT: 62 IN

## 2017-09-20 LAB
GLUCOSE BLDC GLUCOMTR-MCNC: 110 MG/DL (ref 70–130)
GLUCOSE BLDC GLUCOMTR-MCNC: 113 MG/DL (ref 70–130)

## 2017-09-20 PROCEDURE — 99239 HOSP IP/OBS DSCHRG MGMT >30: CPT | Performed by: PSYCHIATRY & NEUROLOGY

## 2017-09-20 PROCEDURE — 82962 GLUCOSE BLOOD TEST: CPT

## 2017-09-20 RX ORDER — HYDROXYZINE PAMOATE 50 MG/1
50 CAPSULE ORAL 3 TIMES DAILY PRN
Qty: 60 CAPSULE | Refills: 0 | Status: SHIPPED | OUTPATIENT
Start: 2017-09-20

## 2017-09-20 RX ADMIN — METFORMIN HYDROCHLORIDE 500 MG: 500 TABLET ORAL at 08:24

## 2017-09-20 RX ADMIN — OXYCODONE HYDROCHLORIDE AND ACETAMINOPHEN 1 TABLET: 7.5; 325 TABLET ORAL at 08:25

## 2017-09-20 RX ADMIN — DULOXETINE 60 MG: 60 CAPSULE, DELAYED RELEASE ORAL at 08:24

## 2017-09-20 RX ADMIN — HYDROXYZINE PAMOATE 50 MG: 50 CAPSULE ORAL at 09:43

## 2017-09-20 RX ADMIN — NAPROXEN 500 MG: 500 TABLET ORAL at 08:24

## 2017-10-10 ENCOUNTER — TRANSCRIBE ORDERS (OUTPATIENT)
Dept: PHYSICAL THERAPY | Facility: HOSPITAL | Age: 42
End: 2017-10-10

## 2017-10-10 ENCOUNTER — DOCUMENTATION (OUTPATIENT)
Dept: PHYSICAL THERAPY | Facility: HOSPITAL | Age: 42
End: 2017-10-10

## 2017-10-10 ENCOUNTER — HOSPITAL ENCOUNTER (OUTPATIENT)
Dept: PHYSICAL THERAPY | Facility: HOSPITAL | Age: 42
Setting detail: THERAPIES SERIES
Discharge: HOME | End: 2017-10-10

## 2017-10-10 DIAGNOSIS — M75.01 ADHESIVE CAPSULITIS OF RIGHT SHOULDER: Primary | ICD-10-CM

## 2017-10-10 DIAGNOSIS — M75.00 ADHESIVE CAPSULITIS OF SHOULDER, UNSPECIFIED LATERALITY: ICD-10-CM

## 2017-10-10 PROCEDURE — 97163 PT EVAL HIGH COMPLEX 45 MIN: CPT | Performed by: PHYSICAL THERAPIST

## 2017-10-10 PROCEDURE — 97110 THERAPEUTIC EXERCISES: CPT | Performed by: PHYSICAL THERAPIST
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
## 2017-10-16 ENCOUNTER — HOSPITAL ENCOUNTER (OUTPATIENT)
Dept: PHYSICAL THERAPY | Facility: HOSPITAL | Age: 42
Setting detail: THERAPIES SERIES
Discharge: HOME | End: 2017-10-16

## 2017-10-16 DIAGNOSIS — M75.01 ADHESIVE CAPSULITIS OF RIGHT SHOULDER: Primary | ICD-10-CM

## 2017-10-16 DIAGNOSIS — M77.8 TENDINITIS OF RIGHT SHOULDER: ICD-10-CM

## 2017-10-16 DIAGNOSIS — M75.51 BURSITIS OF RIGHT SHOULDER: ICD-10-CM

## 2017-10-16 PROCEDURE — 97110 THERAPEUTIC EXERCISES: CPT | Performed by: PHYSICAL THERAPIST
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
## 2017-10-18 ENCOUNTER — HOSPITAL ENCOUNTER (OUTPATIENT)
Dept: PHYSICAL THERAPY | Facility: HOSPITAL | Age: 42
Setting detail: THERAPIES SERIES
Discharge: HOME | End: 2017-10-18

## 2017-10-18 DIAGNOSIS — M77.8 TENDINITIS OF RIGHT SHOULDER: ICD-10-CM

## 2017-10-18 DIAGNOSIS — M75.01 ADHESIVE CAPSULITIS OF RIGHT SHOULDER: Primary | ICD-10-CM

## 2017-10-18 DIAGNOSIS — M75.51 BURSITIS OF RIGHT SHOULDER: ICD-10-CM

## 2017-10-18 PROCEDURE — 97110 THERAPEUTIC EXERCISES: CPT | Performed by: PHYSICAL THERAPIST

## 2017-10-18 NOTE — THERAPY TREATMENT NOTE
Outpatient Physical Therapy Ortho Treatment Note  [facility]     Patient Name: Christal Holder  : 1975  MRN: [id number]  Today's Date: 10/18/2017      Visit Date: 10/18/2017  Attendance: 3/3 (no visit limit)  Subjective Improvement: minimal  Next MD Appt: PRN  Recert Date: 10/31/17      Therapy Diagnosis: 1) R shoulder adhesive capsulitis; 2) R shoulder weakness; 3) pain     Visit Dx:    ICD-10-CM ICD-9-CM   1. Adhesive capsulitis of right shoulder M75.01 726.0   2. Bursitis of right shoulder M75.51 726.10   3. Tendinitis of right shoulder M75.81 726.10       Patient Active Problem List   Diagnosis   • Suicide ideation   • Severe episode of recurrent major depressive disorder, without psychotic features        Past Medical History:   Diagnosis Date   • Anxiety    • Atelectasis    • Chronic pain disorder    • Compression fracture of body of thoracic vertebra     T6-T12   • COPD (chronic obstructive pulmonary disease)     due to trauma   • Depression    • Diabetes mellitus    • Glenoid fracture of shoulder    • Pneumothorax     due to rib fractures   • Rib fracture    • Scapular fracture         Past Surgical History:   Procedure Laterality Date   • CHOLECYSTECTOMY     • GLENOID OPEN REDUCTION INTERNAL FIXATION Right    • SCAPULA OPEN REDUCTION INTERNAL FIXATION     • TUBAL ABDOMINAL LIGATION               PT Ortho       10/18/17 1400    Subjective Pain    Subjective Pain Comment declines modalities  -SS    Right Shoulder    Flexion AROM Deficit 70 standing  -SS    Extension AROM Deficit 20 standing  -SS    ABduction AROM Deficit 65 standing  -SS    External Rotation AROM Deficit 35 standing with shoulder neutral  -SS      10/16/17 1300    Precautions and Contraindications    Precautions/Limitations other (see comments)   see below  -SS    Precautions multiple compression fractures thoracic spine  -SS    Contraindications none noted  -SS    Posture/Observations    Posture/Observations Comments  "Atrophy R scapular muscles  -    Right Shoulder    Flexion AROM Deficit 75 standing  -SS    Extension AROM Deficit 31  -SS    ABduction AROM Deficit 60 standing  -      User Key  (r) = Recorded By, (t) = Taken By, (c) = Cosigned By    Initials Name Provider Type     Alfonzo Maxwell, PT DPT Physical Therapist                            PT Assessment/Plan       10/18/17 1400       PT Assessment    Functional Limitations Limitation in home management;Limitations in community activities;Performance in leisure activities;Performance in self-care ADL;Performance in work activities  -     Impairments Dexterity;Endurance;Muscle strength;Pain;Posture;Range of motion  -     Assessment Comments Improved shoulder abduction, decreased shoulder flexion and extension compared to last treatment. Tolerates therex without complaint.  -     Rehab Potential Fair   barrier: nerve damage, vertebral fractures  -     Patient/caregiver participated in establishment of treatment plan and goals Yes  -     Patient would benefit from skilled therapy intervention Yes  -SS     PT Plan    PT Frequency 2x/week  -     Predicted Duration of Therapy Intervention (days/wks) 6-8 weeks  -     PT Plan Comments Continue POC. Progress as tolerated.  -       User Key  (r) = Recorded By, (t) = Taken By, (c) = Cosigned By    Initials Name Provider Type     Alfonzo Maxwell, PT DPT Physical Therapist                    Exercises       10/18/17 1400          Subjective Comments    Subjective Comments Felt fine Tuesday afternoon and slept \"okay\" Tuesday night. Sore and painful yesterday. Also had to track down a broken water line. May have overdone things yesterday.  -      Subjective Pain    Able to rate subjective pain? yes  -      Pre-Treatment Pain Level 5  -SS      Post-Treatment Pain Level 5  -SS      Subjective Pain Comment declines modalities  -      Exercise 1    Exercise Name 1 Pro2, Seat 6, U/LE F/R  -SS      " Cueing 1 Verbal  -SS      Time (Minutes) 1 8 mins  -SS      Additional Comments Level 1  -SS      Exercise 2    Exercise Name 2 Passive flexion walkaway in [employer]  -SS      Cueing 2 Demo  -SS      Sets 2 1  -SS      Reps 2 15  -SS      Exercise 3    Exercise Name 3 Passive abduction walk away in [employer]  -SS      Cueing 3 Demo  -SS      Sets 3 1  -SS      Reps 3 15  -SS      Exercise 4    Exercise Name 4 Passive ER in TrueStretch  -SS      Cueing 4 Demo  -SS      Sets 4 1  -SS      Reps 4 15  -SS      Exercise 5    Exercise Name 5 Wand AA shoulder flexion with table inclined 35 degrees  -SS      Cueing 5 Verbal  -SS      Sets 5 2  -SS      Reps 5 10  -SS      Exercise 6    Exercise Name 6 Wand AA ER with table inclined 35 degrees  -SS      Cueing 6 Verbal  -SS      Sets 6 1  -SS      Reps 6 10  -SS      Exercise 7    Exercise Name 7 Shoulder active flexion with table inclined 30 degrees  -SS      Cueing 7 Verbal  -SS      Sets 7 3  -SS      Reps 7 5  -SS        User Key  (r) = Recorded By, (t) = Taken By, (c) = Cosigned By    Initials Name Provider Type    SS Alfonzo Maxwell, PT DPT Physical Therapist                               PT OP Goals       10/18/17 1400       PT Short Term Goals    STG Date to Achieve 10/31/17  -SS     STG 1 Increase standing R shoulder active flexion AROM to >/= 80 degrees  -SS     STG 1 Progress Not Met  -SS     STG 2 Increase supine R shoulder active flexion AROM to >/= 90 degrees  -SS     STG 2 Progress Not Met  -SS     STG 3 Increase standing R shoulder abduction AROM to >/= 60 degrees  -SS     STG 3 Progress Met  -SS     STG 4 Increase R shoulder extension AROM to >/= 21 degrees  -SS     STG 4 Progress Not Met  -SS     Long Term Goals    LTG Date to Achieve 12/19/17  -SS     LTG 1 Independent with HEP  -SS     LTG 1 Progress Not Met  -SS     LTG 2 Standing R shoulder flexion AROM to be >/= 100 degrees  -SS     LTG 2 Progress Not Met  -SS     LTG 3 Supine R shoulder  flexion AROM to be >/= 120 degrees  -SS     LTG 3 Progress Not Met  -SS     LTG 4 Standing R shoulder abduction AROM to be >/= 75 degrees  -SS     LTG 4 Progress Not Met  -SS     LTG 5 Report ability to reach to shoulder height to put away dishes  -SS     LTG 5 Progress Not Met  -SS     LTG 6 QuickDASH score to be </= 75  -SS     LTG 6 Progress Not Met  -SS     Time Calculation    PT Goal Re-Cert Due Date 10/31/17  -SS       User Key  (r) = Recorded By, (t) = Taken By, (c) = Cosigned By    Initials Name Provider Type     Alfonzo Maxwell, PT DPT Physical Therapist                Therapy Education       10/18/17 1400          Therapy Education    Given HEP  -SS      Program Reinforced  -SS      How Provided Verbal  -SS      Provided to Patient  -SS      Level of Understanding Verbalized;Demonstrated  -SS        User Key  (r) = Recorded By, (t) = Taken By, (c) = Cosigned By    Initials Name Provider Type     Alfonzo Maxwell, MICHAELA DPT Physical Therapist                Time Calculation:   Start Time: 1357  Stop Time: 1435  Time Calculation (min): 38 min    Therapy Charges for Today     Code Description Service Date Service Provider Modifiers Qty    61133024914 HC PT THER PROC EA 15 MIN 10/18/2017 Alfonzo Maxwell, PT DPT GP 3                    Alfonzo Maxwell, PT, DPT, CHT  10/18/2017

## 2017-10-23 ENCOUNTER — HOSPITAL ENCOUNTER (OUTPATIENT)
Dept: PHYSICAL THERAPY | Facility: HOSPITAL | Age: 42
Setting detail: THERAPIES SERIES
Discharge: HOME | End: 2017-10-23

## 2017-10-23 DIAGNOSIS — M75.01 ADHESIVE CAPSULITIS OF RIGHT SHOULDER: Primary | ICD-10-CM

## 2017-10-23 DIAGNOSIS — M77.8 TENDINITIS OF RIGHT SHOULDER: ICD-10-CM

## 2017-10-23 DIAGNOSIS — M75.51 BURSITIS OF RIGHT SHOULDER: ICD-10-CM

## 2017-10-23 PROCEDURE — 97110 THERAPEUTIC EXERCISES: CPT | Performed by: PHYSICAL THERAPIST

## 2017-10-23 NOTE — THERAPY TREATMENT NOTE
Outpatient Physical Therapy Ortho Treatment Note  [facility]     Patient Name: Christal Holder  : 1975  MRN: [id number]  Today's Date: 10/23/2017      Visit Date: 10/23/2017  Attendance:  (no visit limit)  Subjective Improvement: minimal  Next MD Appt: PRN  Recert Date: 10/31/17      Therapy Diagnosis: 1) R shoulder adhesive capsulitis; 2) R shoulder weakness; 3) pain     Visit Dx:    ICD-10-CM ICD-9-CM   1. Adhesive capsulitis of right shoulder M75.01 726.0   2. Bursitis of right shoulder M75.51 726.10   3. Tendinitis of right shoulder M75.81 726.10       Patient Active Problem List   Diagnosis   • Suicide ideation   • Severe episode of recurrent major depressive disorder, without psychotic features        Past Medical History:   Diagnosis Date   • Anxiety    • Atelectasis    • Chronic pain disorder    • Compression fracture of body of thoracic vertebra     T6-T12   • COPD (chronic obstructive pulmonary disease)     due to trauma   • Depression    • Diabetes mellitus    • Glenoid fracture of shoulder    • Pneumothorax     due to rib fractures   • Rib fracture    • Scapular fracture         Past Surgical History:   Procedure Laterality Date   • CHOLECYSTECTOMY     • GLENOID OPEN REDUCTION INTERNAL FIXATION Right    • SCAPULA OPEN REDUCTION INTERNAL FIXATION     • TUBAL ABDOMINAL LIGATION                               PT Assessment/Plan       10/23/17 1000       PT Assessment    Functional Limitations Limitation in home management;Limitations in community activities;Performance in leisure activities;Performance in self-care ADL;Performance in work activities  -     Impairments Dexterity;Endurance;Muscle strength;Pain;Posture;Range of motion  -     Assessment Comments Pain with concentric SLER but not with eccentric. Sore with activity.  -     Rehab Potential Fair   barrier: nerve damage, vertebral fractures  -     Patient/caregiver participated in establishment of treatment plan and  goals Yes  -SS     Patient would benefit from skilled therapy intervention Yes  -SS     PT Plan    PT Frequency 2x/week  -SS     Predicted Duration of Therapy Intervention (days/wks) 6-8 weeks  -SS     PT Plan Comments Continue eccentric SLER. Continue POC. Progress as tolerated. Recheck next week.  -SS       User Key  (r) = Recorded By, (t) = Taken By, (c) = Cosigned By    Initials Name Provider Type    SS Alfonzo Maxwell, PT DPT Physical Therapist                Modalities       10/23/17 1000          Moist Heat    MH Applied Yes  -SS      Location trunk and R shoulder  -SS      Rx Minutes 15 mins  -SS      MH Prior to Rx No  -SS      MH S/P Rx Yes  -SS        User Key  (r) = Recorded By, (t) = Taken By, (c) = Cosigned By    Initials Name Provider Type     Alfonzo Maxwell, PT DPT Physical Therapist                Exercises       10/23/17 1000          Subjective Comments    Subjective Comments Sore 10/19 after her last therapy session. Wasn't as painful as she was on 10/17. Elbow locked yesterday  -SS      Subjective Pain    Able to rate subjective pain? yes  -SS      Pre-Treatment Pain Level 5  -SS      Post-Treatment Pain Level 5  -SS      Exercise 1    Exercise Name 1 Pro2, Seat6, U/LE, F/R  -SS      Cueing 1 Verbal  -SS      Time (Minutes) 1 9 mins  -SS      Additional Comments Level 1  -SS      Exercise 2    Exercise Name 2 Passive flexion walkaway in [employer]  -SS      Cueing 2 Demo  -SS      Sets 2 2  -SS      Reps 2 15  -SS      Exercise 3    Exercise Name 3 Shoulder active flexion with table inclined 30 degrees  -SS      Cueing 3 Verbal  -SS      Sets 3 4  -SS      Reps 3 5  -SS      Exercise 4    Exercise Name 4 SLER - 1 set concentric, 1 set eccentric  -SS      Cueing 4 Verbal  -SS      Sets 4 1  -SS      Reps 4 5   each  -SS      Time (Seconds) 4 3 sec hold  -SS      Additional Comments pain with concentric SLER  -SS        User Key  (r) = Recorded By, (t) = Taken By, (c) = Cosigned By     Initials Name Provider Type     Alfonzo Maxwell, PT DPT Physical Therapist                               PT OP Goals       10/23/17 1000       PT Short Term Goals    STG Date to Achieve 10/31/17  -SS     STG 1 Increase standing R shoulder active flexion AROM to >/= 80 degrees  -SS     STG 1 Progress Not Met  -SS     STG 2 Increase supine R shoulder active flexion AROM to >/= 90 degrees  -SS     STG 2 Progress Not Met  -SS     STG 3 Increase standing R shoulder abduction AROM to >/= 60 degrees  -SS     STG 3 Progress Met  -SS     STG 4 Increase R shoulder extension AROM to >/= 21 degrees  -SS     STG 4 Progress Not Met  -SS     Long Term Goals    LTG Date to Achieve 12/19/17  -SS     LTG 1 Independent with HEP  -SS     LTG 1 Progress Not Met  -SS     LTG 2 Standing R shoulder flexion AROM to be >/= 100 degrees  -SS     LTG 2 Progress Not Met  -SS     LTG 3 Supine R shoulder flexion AROM to be >/= 120 degrees  -SS     LTG 3 Progress Not Met  -SS     LTG 4 Standing R shoulder abduction AROM to be >/= 75 degrees  -SS     LTG 4 Progress Not Met  -SS     LTG 5 Report ability to reach to shoulder height to put away dishes  -SS     LTG 5 Progress Not Met  -SS     LTG 6 QuickDASH score to be </= 75  -SS     LTG 6 Progress Not Met  -SS     Time Calculation    PT Goal Re-Cert Due Date 10/31/17  -SS       User Key  (r) = Recorded By, (t) = Taken By, (c) = Cosigned By    Initials Name Provider Type     Alfonzo Maxwell, PT DPT Physical Therapist                Therapy Education       10/23/17 1400          Therapy Education    Given HEP  -SS      Program Reinforced  -SS      How Provided Verbal  -SS      Provided to Patient  -SS      Level of Understanding Verbalized;Demonstrated  -SS        User Key  (r) = Recorded By, (t) = Taken By, (c) = Cosigned By    Initials Name Provider Type     Alfonzo Maxwell, PT DPT Physical Therapist                Time Calculation:   Start Time: 1026  Stop Time: 1117  Time  Calculation (min): 51 min    Therapy Charges for Today     Code Description Service Date Service Provider Modifiers Qty    73170484517 HC PT THER PROC EA 15 MIN 10/23/2017 Alfonzo Maxwell, PT DPT GP 2    72370635059 HC PT THER SUPP EA 15 MIN 10/23/2017 Alfonzo Maxwell, PT DPT GP 1                    Alfonzo Maxwell, PT, DPT, CHT  10/23/2017

## 2017-10-26 ENCOUNTER — HOSPITAL ENCOUNTER (OUTPATIENT)
Dept: PHYSICAL THERAPY | Facility: HOSPITAL | Age: 42
Setting detail: THERAPIES SERIES
Discharge: HOME | End: 2017-10-26

## 2017-10-26 DIAGNOSIS — M75.01 ADHESIVE CAPSULITIS OF RIGHT SHOULDER: Primary | ICD-10-CM

## 2017-10-26 DIAGNOSIS — M75.51 BURSITIS OF RIGHT SHOULDER: ICD-10-CM

## 2017-10-26 DIAGNOSIS — M77.8 TENDINITIS OF RIGHT SHOULDER: ICD-10-CM

## 2017-10-26 PROCEDURE — 97110 THERAPEUTIC EXERCISES: CPT | Performed by: PHYSICAL THERAPIST

## 2017-10-27 NOTE — THERAPY TREATMENT NOTE
Outpatient Physical Therapy Ortho Treatment Note  [facility]     Patient Name: Christal Holder  : 1975  MRN: [id number]  Today's Date: 10/26/2017      Visit Date: 10/26/2017  Attendance:  (no visit limit)  Subjective Improvement: minimal  Next MD Appt: PRN  Recert Date: 10/31/17      Therapy Diagnosis: 1) R shoulder adhesive capsulitis; 2) R shoulder weakness; 3) pain     Visit Dx:    ICD-10-CM ICD-9-CM   1. Adhesive capsulitis of right shoulder M75.01 726.0   2. Tendinitis of right shoulder M75.81 726.10   3. Bursitis of right shoulder M75.51 726.10       Patient Active Problem List   Diagnosis   • Suicide ideation   • Severe episode of recurrent major depressive disorder, without psychotic features        Past Medical History:   Diagnosis Date   • Anxiety    • Atelectasis    • Chronic pain disorder    • Compression fracture of body of thoracic vertebra     T6-T12   • COPD (chronic obstructive pulmonary disease)     due to trauma   • Depression    • Diabetes mellitus    • Glenoid fracture of shoulder    • Pneumothorax     due to rib fractures   • Rib fracture    • Scapular fracture         Past Surgical History:   Procedure Laterality Date   • CHOLECYSTECTOMY     • GLENOID OPEN REDUCTION INTERNAL FIXATION Right    • SCAPULA OPEN REDUCTION INTERNAL FIXATION     • TUBAL ABDOMINAL LIGATION               PT Ortho       10/26/17 1000    Right Shoulder    Flexion AROM Deficit 112 standing; 105 inclined 30 deg  -SS    Extension AROM Deficit 30  -SS    ABduction AROM Deficit 97 standing  -SS    External Rotation AROM Deficit 37 standing with shoulder neutral  -SS      User Key  (r) = Recorded By, (t) = Taken By, (c) = Cosigned By    Initials Name Provider Type    EDUARDA Maxwell, PT DPT Physical Therapist                            PT Assessment/Plan       10/26/17 1000       PT Assessment    Functional Limitations Limitation in home management;Limitations in community  "activities;Performance in leisure activities;Performance in self-care ADL;Performance in work activities  -     Impairments Dexterity;Endurance;Muscle strength;Pain;Posture;Range of motion  -     Assessment Comments Increased soreness with 2nd set of eccentric SLER. Significantly improved AROM this date.  -     Rehab Potential Fair   barrier: nerve damage, vertebral fractures  -     Patient/caregiver participated in establishment of treatment plan and goals Yes  -SS     Patient would benefit from skilled therapy intervention Yes  -SS     PT Plan    PT Frequency 2x/week  -SS     Predicted Duration of Therapy Intervention (days/wks) 6-8 weeks  -SS     PT Plan Comments Continue POC. Progressive shoulder ROM and strengthening. Recheck next week.  -SS       User Key  (r) = Recorded By, (t) = Taken By, (c) = Cosigned By    Initials Name Provider Type     Alfonzo Maxwell, PT DPT Physical Therapist                Modalities       10/26/17 1000          Moist Heat    MH Applied Yes  -SS      Location trunk and R shoulder  -SS      Rx Minutes 15 mins  -SS      MH Prior to Rx No  -SS      MH S/P Rx Yes  -SS        User Key  (r) = Recorded By, (t) = Taken By, (c) = Cosigned By    Initials Name Provider Type     Alfonzo Maxwell, PT DPT Physical Therapist                Exercises       10/26/17 1000          Subjective Comments    Subjective Comments Pain not as bad today. Heat after last therapy session helped with her pain all day. Wasn't too sore 10/24 but more sore 10/25. \"I can tell that it is gradually loosening. Some days I can use it better than others.\"  -SS      Subjective Pain    Able to rate subjective pain? yes  -SS      Pre-Treatment Pain Level 3  -SS      Post-Treatment Pain Level 3  -SS      Exercise 1    Exercise Name 1 Pro2, Seat 6, U/LE, F/R  -      Cueing 1 Verbal  -      Time (Minutes) 1 8 mins  -SS      Additional Comments Level 2  -SS      Exercise 2    Exercise Name 2 Passive " flexion walk away at wooden steps  -SS      Cueing 2 Demo  -SS      Sets 2 2  -SS      Reps 2 20  -SS      Time (Seconds) 2 2 sec hold  -SS      Exercise 3    Exercise Name 3 Shoulder active flexion with table inclined 30 degrees  -SS      Cueing 3 Verbal  -SS      Sets 3 2  -SS      Reps 3 10  -SS      Time (Seconds) 3 2 sec hold  -SS      Exercise 4    Exercise Name 4 SLER - eccentric lowering  -SS      Cueing 4 Verbal;Tactile  -SS      Sets 4 3  -SS      Reps 4 5  -SS      Time (Seconds) 4 2 sec hold  -SS      Exercise 5    Exercise Name 5 Supine serratus punches  -SS      Cueing 5 Verbal  -SS      Sets 5 1  -SS      Reps 5 10  -SS        User Key  (r) = Recorded By, (t) = Taken By, (c) = Cosigned By    Initials Name Provider Type    SS Alfonzo Maxwell, PT DPT Physical Therapist                               PT OP Goals       10/26/17 1000       PT Short Term Goals    STG Date to Achieve 10/31/17  -     STG 1 Increase standing R shoulder active flexion AROM to >/= 80 degrees  -SS     STG 1 Progress Met  -SS     STG 2 Increase supine R shoulder active flexion AROM to >/= 90 degrees  -SS     STG 2 Progress Met  -SS     STG 3 Increase standing R shoulder abduction AROM to >/= 60 degrees  -SS     STG 3 Progress Met  -SS     STG 4 Increase R shoulder extension AROM to >/= 21 degrees  -SS     STG 4 Progress Met  -SS     Long Term Goals    LTG Date to Achieve 12/19/17  -     LTG 1 Independent with HEP  -SS     LTG 1 Progress Not Met  -SS     LTG 2 Standing R shoulder flexion AROM to be >/= 100 degrees  -SS     LTG 2 Progress Met  -SS     LTG 3 Supine R shoulder flexion AROM to be >/= 120 degrees  -SS     LTG 3 Progress Not Met  -SS     LTG 4 Standing R shoulder abduction AROM to be >/= 75 degrees  -SS     LTG 4 Progress Met  -SS     LTG 5 Report ability to reach to shoulder height to put away dishes  -SS     LTG 5 Progress Not Met  -SS     LTG 6 QuickDASH score to be </= 75  -SS     LTG 6 Progress Not Met  -SS      Time Calculation    PT Goal Re-Cert Due Date 10/31/17  -SS       User Key  (r) = Recorded By, (t) = Taken By, (c) = Cosigned By    Initials Name Provider Type    EDUARDA Maxwell PT DPT Physical Therapist                Therapy Education       10/26/17 1000          Therapy Education    Given HEP  -SS      Program Reinforced  -SS      How Provided Verbal  -SS      Provided to Patient  -SS      Level of Understanding Verbalized;Demonstrated  -SS        User Key  (r) = Recorded By, (t) = Taken By, (c) = Cosigned By    Initials Name Provider Type    EDUARDA Maxwell, PT DPT Physical Therapist                Time Calculation:   Start Time: 1018  Stop Time: 1106  Time Calculation (min): 48 min    Therapy Charges for Today     Code Description Service Date Service Provider Modifiers Qty    45580472331 HC PT THER PROC EA 15 MIN 10/26/2017 Alfonzo Maxwell PT DPT GP 2    60390353346 HC PT THER SUPP EA 15 MIN 10/26/2017 Alfonzo Maxwell, PT DPT GP 1                    Alfonzo Maxwell, PT, DPT, CHT  10/26/2017

## 2017-10-30 ENCOUNTER — HOSPITAL ENCOUNTER (OUTPATIENT)
Dept: PHYSICAL THERAPY | Facility: HOSPITAL | Age: 42
Setting detail: THERAPIES SERIES
Discharge: HOME | End: 2017-10-30

## 2017-10-30 DIAGNOSIS — M75.51 BURSITIS OF RIGHT SHOULDER: ICD-10-CM

## 2017-10-30 DIAGNOSIS — M77.8 TENDINITIS OF RIGHT SHOULDER: ICD-10-CM

## 2017-10-30 DIAGNOSIS — M75.01 ADHESIVE CAPSULITIS OF RIGHT SHOULDER: Primary | ICD-10-CM

## 2017-10-30 PROCEDURE — 97110 THERAPEUTIC EXERCISES: CPT | Performed by: PHYSICAL THERAPIST

## 2017-10-30 NOTE — THERAPY TREATMENT NOTE
Outpatient Physical Therapy Ortho Treatment Note  [facility]     Patient Name: Christal Holder  : 1975  MRN: [id number]  Today's Date: 10/30/2017      Visit Date: 10/30/2017  Attendance:  (no visit limit)  Subjective Improvement: minimal  Next MD Appt: PRN  Recert Date: 10/31/17      Therapy Diagnosis: 1) R shoulder adhesive capsulitis; 2) R shoulder weakness; 3) pain     Visit Dx:    ICD-10-CM ICD-9-CM   1. Adhesive capsulitis of right shoulder M75.01 726.0   2. Tendinitis of right shoulder M75.81 726.10   3. Bursitis of right shoulder M75.51 726.10       Patient Active Problem List   Diagnosis   • Suicide ideation   • Severe episode of recurrent major depressive disorder, without psychotic features        Past Medical History:   Diagnosis Date   • Anxiety    • Atelectasis    • Chronic pain disorder    • Compression fracture of body of thoracic vertebra     T6-T12   • COPD (chronic obstructive pulmonary disease)     due to trauma   • Depression    • Diabetes mellitus    • Glenoid fracture of shoulder    • Pneumothorax     due to rib fractures   • Rib fracture    • Scapular fracture         Past Surgical History:   Procedure Laterality Date   • CHOLECYSTECTOMY     • GLENOID OPEN REDUCTION INTERNAL FIXATION Right    • SCAPULA OPEN REDUCTION INTERNAL FIXATION     • TUBAL ABDOMINAL LIGATION                               PT Assessment/Plan       10/30/17 1300       PT Assessment    Functional Limitations Limitation in home management;Limitations in community activities;Performance in leisure activities;Performance in self-care ADL;Performance in work activities  -     Impairments Dexterity;Endurance;Muscle strength;Pain;Posture;Range of motion  -     Assessment Comments Good effort. Sore with therex.  -     Rehab Potential Fair   barrier: nerve damage, vertebral fractures  -     Patient/caregiver participated in establishment of treatment plan and goals Yes  -     Patient would benefit  from skilled therapy intervention Yes  -SS     PT Plan    PT Frequency 2x/week  -SS     Predicted Duration of Therapy Intervention (days/wks) 6-8 weeks  -SS     PT Plan Comments Recheck next. Continue POC  -SS       User Key  (r) = Recorded By, (t) = Taken By, (c) = Cosigned By    Initials Name Provider Type    SS Alfonzo Maxwell, PT DPT Physical Therapist                Modalities       10/30/17 1300          Moist Heat    MH Applied Yes  -SS      Location trunk and R shoulder  -SS      Rx Minutes 15 mins  -SS      MH Prior to Rx No  -SS      MH S/P Rx Yes  -SS        User Key  (r) = Recorded By, (t) = Taken By, (c) = Cosigned By    Initials Name Provider Type     lAfonzo Maxwell, PT DPT Physical Therapist                Exercises       10/30/17 1300          Subjective Comments    Subjective Comments Not feeling well in general. Back and shoulder are hurting more. Sinuses are bothering her. Has been on an antibiotic since 10/26/17. Did OK with therapy last session.20-25% subjective improvement.  -SS      Subjective Pain    Able to rate subjective pain? yes  -SS      Pre-Treatment Pain Level 5  -SS      Post-Treatment Pain Level 5  -SS      Aquatics    Aquatics performed? No  -SS      Exercise 1    Exercise Name 1 Pro2, Seat 6, U/LE, F/R  -SS      Cueing 1 Verbal  -SS      Time (Minutes) 1 10 mins  -SS      Additional Comments Level 2  -SS      Exercise 2    Exercise Name 2 Passive flexion walk away at wooden steps  -SS      Cueing 2 Demo  -SS      Sets 2 1  -SS      Reps 2 30  -SS      Time (Seconds) 2 2 sec hold  -SS      Exercise 3    Exercise Name 3 Scap squeeze and shoulder  ER  -SS      Cueing 3 Demo  -SS      Sets 3 1  -SS      Reps 3 10  -SS      Time (Seconds) 3 3 sec hold  -SS      Exercise 4    Exercise Name 4 Shoulder active flexion with table inclined 45 deg  -SS      Cueing 4 Verbal  -SS      Sets 4 2  -SS      Reps 4 10  -SS      Time (Seconds) 4 2 sec hold  -SS      Exercise 5     Exercise Name 5 SLER - eccentric lowering  -SS      Cueing 5 Tactile  -SS      Sets 5 2  -SS      Reps 5 10  -SS        User Key  (r) = Recorded By, (t) = Taken By, (c) = Cosigned By    Initials Name Provider Type     Alfonzo Maxwell, PT DPT Physical Therapist                               PT OP Goals       10/30/17 1300       PT Short Term Goals    STG Date to Achieve 10/31/17  -SS     STG 1 Increase standing R shoulder active flexion AROM to >/= 80 degrees  -SS     STG 1 Progress Met  -SS     STG 2 Increase supine R shoulder active flexion AROM to >/= 90 degrees  -SS     STG 2 Progress Met  -SS     STG 3 Increase standing R shoulder abduction AROM to >/= 60 degrees  -SS     STG 3 Progress Met  -SS     STG 4 Increase R shoulder extension AROM to >/= 21 degrees  -SS     STG 4 Progress Met  -SS     Long Term Goals    LTG Date to Achieve 12/19/17  -SS     LTG 1 Independent with HEP  -SS     LTG 1 Progress Not Met  -SS     LTG 2 Standing R shoulder flexion AROM to be >/= 100 degrees  -SS     LTG 2 Progress Met  -SS     LTG 3 Supine R shoulder flexion AROM to be >/= 120 degrees  -SS     LTG 3 Progress Not Met  -SS     LTG 4 Standing R shoulder abduction AROM to be >/= 75 degrees  -SS     LTG 4 Progress Met  -SS     LTG 5 Report ability to reach to shoulder height to put away dishes  -SS     LTG 5 Progress Not Met  -SS     LTG 6 QuickDASH score to be </= 75  -SS     LTG 6 Progress Not Met  -SS     Time Calculation    PT Goal Re-Cert Due Date 10/31/17  -       User Key  (r) = Recorded By, (t) = Taken By, (c) = Cosigned By    Initials Name Provider Type     Alfonzo Maxwell, PT DPT Physical Therapist                Therapy Education       10/30/17 1300          Therapy Education    Given HEP  -SS      Program Reinforced  -SS      How Provided Verbal  -SS      Provided to Patient  -SS      Level of Understanding Verbalized;Demonstrated  -SS        User Key  (r) = Recorded By, (t) = Taken By, (c) = Cosigned  By    Initials Name Provider Type    SS Alfonzo Maxwell, PT DPT Physical Therapist                Time Calculation:   Start Time: 1303  Stop Time: 1356  Time Calculation (min): 53 min    Therapy Charges for Today     Code Description Service Date Service Provider Modifiers Qty    55957064551 HC PT THER PROC EA 15 MIN 10/30/2017 Alfonzo Maxwell, PT DPT GP 2    64041721658 HC PT THER SUPP EA 15 MIN 10/30/2017 Alfonzo Maxwell, PT DPT GP 1                    Alfonzo Maxwell, PT, DPT, CHT  10/30/2017

## 2017-11-01 ENCOUNTER — HOSPITAL ENCOUNTER (OUTPATIENT)
Dept: PHYSICAL THERAPY | Facility: HOSPITAL | Age: 42
Setting detail: THERAPIES SERIES
Discharge: HOME | End: 2017-11-01

## 2017-11-01 DIAGNOSIS — M75.51 BURSITIS OF RIGHT SHOULDER: ICD-10-CM

## 2017-11-01 DIAGNOSIS — M75.01 ADHESIVE CAPSULITIS OF RIGHT SHOULDER: Primary | ICD-10-CM

## 2017-11-01 DIAGNOSIS — M77.8 TENDINITIS OF RIGHT SHOULDER: ICD-10-CM

## 2017-11-01 PROCEDURE — 97110 THERAPEUTIC EXERCISES: CPT | Performed by: PHYSICAL THERAPIST

## 2017-11-02 NOTE — THERAPY PROGRESS REPORT/RE-CERT
Outpatient Physical Therapy Ortho Progress Note  [facility]     Patient Name: Christal Holder  : 1975  MRN: [id number]  Today's Date: 2017      Visit Date: 2017  Attendance:  (no visit limit)  Subjective Improvement: ??  Next MD Appt: PRN  Recert Date: 17      Therapy Diagnosis: 1) R shoulder adhesive capsulitis; 2) R shoulder weakness; 3) pain          Past Medical History:   Diagnosis Date   • Anxiety    • Atelectasis    • Chronic pain disorder    • Compression fracture of body of thoracic vertebra     T6-T12   • COPD (chronic obstructive pulmonary disease)     due to trauma   • Depression    • Diabetes mellitus    • Glenoid fracture of shoulder    • Pneumothorax     due to rib fractures   • Rib fracture    • Scapular fracture         Past Surgical History:   Procedure Laterality Date   • CHOLECYSTECTOMY     • GLENOID OPEN REDUCTION INTERNAL FIXATION Right    • SCAPULA OPEN REDUCTION INTERNAL FIXATION     • TUBAL ABDOMINAL LIGATION         Visit Dx:     ICD-10-CM ICD-9-CM   1. Adhesive capsulitis of right shoulder M75.01 726.0   2. Tendinitis of right shoulder M75.81 726.10   3. Bursitis of right shoulder M75.51 726.10       Changes in Medications: tramadol PRN for pain  Changes in MD Orders: none noted  Number of Work Days Lost: n/a              PT Ortho       17 1300    Precautions and Contraindications    Precautions multiple compression fractures thoracic spine  -SS    Contraindications none noted  -SS    Subjective Pain    Post-Treatment Pain Level 4  -SS    Posture/Observations    Posture/Observations Comments Atrophy R supraspinatus and infraspinatus  -SS    Right Shoulder    Flexion AROM Deficit 120 standing; 101 supine  -SS    Extension AROM Deficit 34  -SS    ABduction AROM Deficit 107 standing; 98 supine  -SS    External Rotation AROM Deficit 21 standing with shoulder neutral; 40 supine shoulder neutral  -SS    Internal Rotation AROM Deficit 74 standing  with arm neutral;  -SS      User Key  (r) = Recorded By, (t) = Taken By, (c) = Cosigned By    Initials Name Provider Type    SS Alfonzo Maxwell, PT DPT Physical Therapist                            Therapy Education       11/01/17 1300          Therapy Education    Given HEP  -SS      Program Reinforced  -SS      How Provided Verbal  -SS      Provided to Patient  -SS      Level of Understanding Verbalized;Demonstrated  -SS        User Key  (r) = Recorded By, (t) = Taken By, (c) = Cosigned By    Initials Name Provider Type    SS Alfonzo Maxwell, PT DPT Physical Therapist                PT OP Goals       11/01/17 1300       PT Short Term Goals    STG Date to Achieve 11/22/17  -SS     STG 1 Increase standing R shoulder active flexion AROM to >/= 80 degrees  -SS     STG 1 Progress Met  -SS     STG 2 Increase supine R shoulder active flexion AROM to >/= 90 degrees  -SS     STG 2 Progress Met  -SS     STG 3 Increase standing R shoulder abduction AROM to >/= 60 degrees  -SS     STG 3 Progress Met  -SS     STG 4 Increase R shoulder extension AROM to >/= 21 degrees  -SS     STG 4 Progress Met  -SS     STG 5 Shoulder ER to be >/= 50 degrees with arm neutral and in supine 90/90  -SS     STG 5 Progress New  -SS     STG 6 Standing shoulder flexion AROM to >/= 130 deg  -SS     STG 6 Progress New  -SS     STG 7 Standing shoulder abduction AROM to >/= 115 deg  -SS     STG 7 Progress New  -SS     Long Term Goals    LTG Date to Achieve 12/19/17  -SS     LTG 1 Independent with HEP  -SS     LTG 1 Progress Not Met  -SS     LTG 2 Standing R shoulder flexion AROM to be >/= 100 degrees  -SS     LTG 2 Progress Met  -SS     LTG 3 Supine R shoulder flexion AROM to be >/= 120 degrees  -SS     LTG 3 Progress Not Met  -SS     LTG 4 Standing R shoulder abduction AROM to be >/= 75 degrees  -SS     LTG 4 Progress Met  -SS     LTG 5 Report ability to reach to shoulder height to put away dishes  -SS     LTG 5 Progress Not Met  -SS     LTG  6 QuickDASH score to be </= 75  -     LTG 6 Progress Not Met  -     LTG 7 Standing shoulder flexion AROM to >/= 140 deg  -     LTG 7 Progress New  -     LTG 8 Standing shoulder abduction AROM to >/= 120 deg  -     LTG 8 Progress New  -     Time Calculation    PT Goal Re-Cert Due Date 11/22/17  -       User Key  (r) = Recorded By, (t) = Taken By, (c) = Cosigned By    Initials Name Provider Type     Alfonzo Maxwell, PT DPT Physical Therapist                PT Assessment/Plan       11/01/17 1300       PT Assessment    Functional Limitations Limitation in home management;Limitations in community activities;Performance in leisure activities;Performance in self-care ADL;Performance in work activities  -     Impairments Dexterity;Endurance;Muscle strength;Pain;Posture;Range of motion  -     Assessment Comments Patient's motion continues to progress. ER tends to be the most painful and restricted, but it is the motion most affected by her nerve injury.  -     Rehab Potential Good   barrier: nerve damage, vertebral fractures  -     Patient/caregiver participated in establishment of treatment plan and goals Yes  -SS     Patient would benefit from skilled therapy intervention Yes  -SS     PT Plan    PT Frequency 2x/week  -     Predicted Duration of Therapy Intervention (days/wks) 3-4 weeks with further TBA  -     PT Plan Comments Continue to work on AROM and strength. MHP for pain.  -       User Key  (r) = Recorded By, (t) = Taken By, (c) = Cosigned By    Initials Name Provider Type     Alfonzo Maxwell, MICHAELA PADRONT Physical Therapist                Modalities       11/01/17 1300          Moist Heat    Location trunk and R shoulder  -SS      Rx Minutes 15 mins  -SS      MH Prior to Rx No  -SS      MH S/P Rx Yes  -SS        User Key  (r) = Recorded By, (t) = Taken By, (c) = Cosigned By    Initials Name Provider Type    MICHAELA DorseyT Physical Therapist             "  Exercises       11/01/17 1300          Subjective Comments    Subjective Comments \"I'm hurting today.\" Cooler, damp weather making her more painful and harder to breathe. 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 Has started tramadol last week.  -SS      Subjective Pain    Able to rate subjective pain? yes  -SS      Pre-Treatment Pain Level 4  -SS      Post-Treatment Pain Level 4  -SS      Exercise 1    Exercise Name 1 Pro2, Seat 7, U/LE, F/R  -SS      Cueing 1 Verbal  -SS      Time (Minutes) 1 10 mins  -SS      Additional Comments Level 2.5  -SS      Exercise 2    Exercise Name 2 Passive flexion walk away in [employer]  -SS      Cueing 2 Verbal  -SS      Sets 2 3  -SS      Reps 2 15  -SS      Time (Seconds) 2 2 sec hold  -SS      Exercise 3    Exercise Name 3 Shoulder active flexion with table inclined 60 deg  -SS      Cueing 3 Verbal  -SS      Sets 3 3  -SS      Reps 3 10  -SS      Time (Seconds) 3 2 sec hold  -SS      Exercise 4    Exercise Name 4 Isometric ball squeeze for pec and IRs  -SS      Cueing 4 Verbal;Demo  -SS      Sets 4 1  -SS      Reps 4 15  -SS      Time (Seconds) 4 5 sec hold  -SS      Exercise 5    Exercise Name 5 Isometric shoulder ER inclined 60 deg  -SS      Cueing 5 Tactile  -SS      Sets 5 1  -SS      Reps 5 10  -SS      Time (Seconds) 5 3 sec hold  -SS        User Key  (r) = Recorded By, (t) = Taken By, (c) = Cosigned By    Initials Name Provider Type    SS Alfonzo Maxwell, PT DPT Physical Therapist                              Time Calculation:   Start Time: 1305  Stop Time: 1403  Time Calculation (min): 58 min     Therapy Charges for Today     Code Description Service Date Service Provider Modifiers Qty    91687863897 HC PT THER PROC EA 15 MIN 11/1/2017 Alfonzo Maxwell, PT DPT GP 3    08715388159 HC PT THER SUPP EA 15 MIN 11/1/2017 Alfonzo Maxwell, PT DPT GP 1                    Alfonzo Maxwell, PT, DPT, CHT  11/1/2017        "

## 2017-11-07 ENCOUNTER — HOSPITAL ENCOUNTER (OUTPATIENT)
Dept: PHYSICAL THERAPY | Facility: HOSPITAL | Age: 42
Setting detail: THERAPIES SERIES
Discharge: HOME | End: 2017-11-07

## 2017-11-07 DIAGNOSIS — M75.01 ADHESIVE BURSITIS OF RIGHT SHOULDER: ICD-10-CM

## 2017-11-07 DIAGNOSIS — M77.8 TENDINITIS OF RIGHT SHOULDER: ICD-10-CM

## 2017-11-07 DIAGNOSIS — M75.01 ADHESIVE CAPSULITIS OF RIGHT SHOULDER: Primary | ICD-10-CM

## 2017-11-07 DIAGNOSIS — M75.51 BURSITIS OF RIGHT SHOULDER: ICD-10-CM

## 2017-11-07 PROCEDURE — 97110 THERAPEUTIC EXERCISES: CPT | Performed by: PHYSICAL THERAPIST

## 2017-11-07 NOTE — THERAPY TREATMENT NOTE
Outpatient Physical Therapy Ortho Treatment Note  [facility]     Patient Name: Christal Holder  : 1975  MRN: [id number]  Today's Date: 2017      Visit Date: 2017  Attendance:  (no visit limit)  Subjective Improvement: 25%  Next MD Appt: PRN  Recert Date: 17      Therapy Diagnosis: 1) R shoulder adhesive capsulitis; 2) R shoulder weakness; 3) pain      Visit Dx:    ICD-10-CM ICD-9-CM   1. Adhesive capsulitis of right shoulder M75.01 726.0   2. Tendinitis of right shoulder M75.81 726.10   3. Bursitis of right shoulder M75.51 726.10   4. Adhesive bursitis of right shoulder M75.01 726.0       Patient Active Problem List   Diagnosis   • Suicide ideation   • Severe episode of recurrent major depressive disorder, without psychotic features        Past Medical History:   Diagnosis Date   • Anxiety    • Atelectasis    • Chronic pain disorder    • Compression fracture of body of thoracic vertebra     T6-T12   • COPD (chronic obstructive pulmonary disease)     due to trauma   • Depression    • Diabetes mellitus    • Glenoid fracture of shoulder    • Pneumothorax     due to rib fractures   • Rib fracture    • Scapular fracture         Past Surgical History:   Procedure Laterality Date   • CHOLECYSTECTOMY     • GLENOID OPEN REDUCTION INTERNAL FIXATION Right    • SCAPULA OPEN REDUCTION INTERNAL FIXATION     • TUBAL ABDOMINAL LIGATION                               PT Assessment/Plan       17 1300       PT Assessment    Functional Limitations Limitation in home management;Limitations in community activities;Performance in leisure activities;Performance in self-care ADL;Performance in work activities  -     Impairments Dexterity;Endurance;Muscle strength;Pain;Posture;Range of motion  -     Assessment Comments Sore with therex. Feels less painful after MHP. Good effort.  -SS     Rehab Potential Good   barriers: nerve damage, vertebral fx,   -SS     Patient/caregiver participated in  "establishment of treatment plan and goals Yes  -SS     Patient would benefit from skilled therapy intervention Yes  -SS     PT Plan    PT Frequency 2x/week  -SS     Predicted Duration of Therapy Intervention (days/wks) 3-4 weeks with further TBA  -SS     PT Plan Comments MHP for pain. Progressive shoulder elevation and ER motion and strengthening  -SS       User Key  (r) = Recorded By, (t) = Taken By, (c) = Cosigned By    Initials Name Provider Type     Alfonzo Maxwell, PT DPT Physical Therapist                Modalities       11/07/17 1300          Subjective Pain    Post-Treatment Pain Level 4  -SS      Moist Heat    MH Applied Yes  -SS      Location trunk and R shoulder  -SS      Rx Minutes 15 mins  -SS      MH Prior to Rx No  -SS      MH S/P Rx Yes  -SS        User Key  (r) = Recorded By, (t) = Taken By, (c) = Cosigned By    Initials Name Provider Type     Alfonzo Maxwell, PT DPT Physical Therapist                Exercises       11/07/17 1300          Subjective Comments    Subjective Comments Pt. stayed in pain this weekend and it very seldom eased. Has a pulley weight bench that she worked on \"put like 10 pounds on it\" and pulled down doing around 15 reps. It did not irritate shoulder. Pt. reported that the weather may have influenced pain. Reported a 25% subjective improvement. She has gotten better range of motion. \"Its not locking as bad\"  -SS      Subjective Pain    Able to rate subjective pain? yes  -SS      Pre-Treatment Pain Level 4  -SS      Post-Treatment Pain Level 4  -SS      Exercise 1    Exercise Name 1 Pro2, Seat 7, U/LE, F/R  -SS      Cueing 1 Verbal  -SS      Time (Minutes) 1 8 mins  -SS      Additional Comments Level 3  -SS      Exercise 2    Exercise Name 2 Passive flexion walk away in [employer]  -      Cueing 2 Verbal  -SS      Sets 2 3  -SS      Reps 2 15  -SS      Exercise 3    Exercise Name 3 Passive external rotation in [employer]Stretch  -      Cueing 3 Verbal  -SS      " Sets 3 3  -SS      Reps 3 10  -SS      Exercise 4    Exercise Name 4 Active shoulder flexion with table inclined at 60 degrees  -SS      Cueing 4 Verbal  -SS      Sets 4 1  -SS      Reps 4 30  -SS      Exercise 5    Exercise Name 5 Isometric ball squeezes for pec and IRs  -SS      Cueing 5 Demo  -SS      Sets 5 1  -SS      Reps 5 20  -SS      Time (Seconds) 5 5 sec hold  -SS      Exercise 6    Exercise Name 6 Isometric shoulder external rotation  -SS      Cueing 6 Verbal  -SS      Sets 6 2  -SS      Reps 6 10  -SS      Time (Seconds) 6 5 sec hold  -SS      Exercise 7    Exercise Name 7 RUE Push Pull PNF Reversals  -SS      Cueing 7 Tactile  -SS      Sets 7 2  -SS      Reps 7 10  -SS        User Key  (r) = Recorded By, (t) = Taken By, (c) = Cosigned By    Initials Name Provider Type    SS Alfonzo Maxwell, PT DPT Physical Therapist                               PT OP Goals       11/07/17 1300       PT Short Term Goals    STG Date to Achieve 11/22/17  -SS     STG 1 Increase standing R shoulder active flexion AROM to >/= 80 degrees  -SS     STG 1 Progress Met  -SS     STG 2 Increase supine R shoulder active flexion AROM to >/= 90 degrees  -SS     STG 2 Progress Met  -SS     STG 3 Increase standing R shoulder abduction AROM to >/= 60 degrees  -SS     STG 3 Progress Met  -SS     STG 4 Increase R shoulder extension AROM to >/= 21 degrees  -SS     STG 4 Progress Met  -SS     STG 5 Shoulder ER to be >/= 50 degrees with arm neutral and in supine 90/90  -SS     STG 5 Progress Ongoing  -SS     STG 6 Standing shoulder flexion AROM to >/= 130 deg  -SS     STG 6 Progress Ongoing  -SS     STG 7 Standing shoulder abduction AROM to >/= 115 deg  -SS     STG 7 Progress Ongoing  -SS     Long Term Goals    LTG Date to Achieve 12/19/17  -SS     LTG 1 Independent with HEP  -SS     LTG 1 Progress Not Met  -SS     LTG 2 Standing R shoulder flexion AROM to be >/= 100 degrees  -SS     LTG 2 Progress Met  -SS     LTG 3 Supine R shoulder  flexion AROM to be >/= 120 degrees  -SS     LTG 3 Progress Not Met  -SS     LTG 4 Standing R shoulder abduction AROM to be >/= 75 degrees  -SS     LTG 4 Progress Met  -SS     LTG 5 Report ability to reach to shoulder height to put away dishes  -SS     LTG 5 Progress Not Met  -SS     LTG 6 QuickDASH score to be </= 75  -SS     LTG 6 Progress Not Met  -SS     LTG 7 Standing shoulder flexion AROM to >/= 140 deg  -SS     LTG 7 Progress New  -SS     LTG 8 Standing shoulder abduction AROM to >/= 120 deg  -SS     LTG 8 Progress New  -SS     Time Calculation    PT Goal Re-Cert Due Date 11/22/17  -SS       User Key  (r) = Recorded By, (t) = Taken By, (c) = Cosigned By    Initials Name Provider Type    EDUARDA Maxwell, PT DPT Physical Therapist                Therapy Education       11/07/17 1300          Therapy Education    Given HEP  -SS      Program Reinforced  -SS      How Provided Verbal  -SS      Provided to Patient  -SS      Level of Understanding Verbalized;Demonstrated  -SS        User Key  (r) = Recorded By, (t) = Taken By, (c) = Cosigned By    Initials Name Provider Type     Alfonzo Maxwell, PT DPT Physical Therapist                Time Calculation:   Start Time: 1303  Stop Time: 1358  Time Calculation (min): 55 min    Therapy Charges for Today     Code Description Service Date Service Provider Modifiers Qty    91433647271 HC PT THER PROC EA 15 MIN 11/7/2017 Alfonzo Maxwell PT DPT GP 3    65499727699 HC PT THER SUPP EA 15 MIN 11/7/2017 Alfonzo Maxwell PT DPT GP 1                    Alfonzo Maxwell, PT, DPT, CHT  11/7/2017

## 2017-11-09 ENCOUNTER — HOSPITAL ENCOUNTER (OUTPATIENT)
Dept: PHYSICAL THERAPY | Facility: HOSPITAL | Age: 42
Setting detail: THERAPIES SERIES
Discharge: HOME | End: 2017-11-09

## 2017-11-09 DIAGNOSIS — M75.01 ADHESIVE BURSITIS OF RIGHT SHOULDER: ICD-10-CM

## 2017-11-09 DIAGNOSIS — M75.01 ADHESIVE CAPSULITIS OF RIGHT SHOULDER: Primary | ICD-10-CM

## 2017-11-09 DIAGNOSIS — M75.51 BURSITIS OF RIGHT SHOULDER: ICD-10-CM

## 2017-11-09 DIAGNOSIS — M77.8 TENDINITIS OF RIGHT SHOULDER: ICD-10-CM

## 2017-11-09 PROCEDURE — 97110 THERAPEUTIC EXERCISES: CPT | Performed by: PHYSICAL THERAPIST

## 2017-11-09 NOTE — THERAPY TREATMENT NOTE
Outpatient Physical Therapy Ortho Treatment Note  [facility]     Patient Name: Christal Holder  : 1975  MRN: [id number]  Today's Date: 2017      Visit Date: 2017  Attendance:  (no visit limit)  Subjective Improvement: 25%  Next MD Appt: PRN  Recert Date: 17      Therapy Diagnosis: 1) R shoulder adhesive capsulitis; 2) R shoulder weakness; 3) pain      Visit Dx:    ICD-10-CM ICD-9-CM   1. Adhesive capsulitis of right shoulder M75.01 726.0   2. Tendinitis of right shoulder M75.81 726.10   3. Bursitis of right shoulder M75.51 726.10   4. Adhesive bursitis of right shoulder M75.01 726.0       Patient Active Problem List   Diagnosis   • Suicide ideation   • Severe episode of recurrent major depressive disorder, without psychotic features        Past Medical History:   Diagnosis Date   • Anxiety    • Atelectasis    • Chronic pain disorder    • Compression fracture of body of thoracic vertebra     T6-T12   • COPD (chronic obstructive pulmonary disease)     due to trauma   • Depression    • Diabetes mellitus    • Glenoid fracture of shoulder    • Pneumothorax     due to rib fractures   • Rib fracture    • Scapular fracture         Past Surgical History:   Procedure Laterality Date   • CHOLECYSTECTOMY     • GLENOID OPEN REDUCTION INTERNAL FIXATION Right    • SCAPULA OPEN REDUCTION INTERNAL FIXATION     • TUBAL ABDOMINAL LIGATION               PT Ortho       17 1300    Right Shoulder    Flexion AROM Deficit 122 standing  -SS    Extension AROM Deficit 33  -SS    ABduction AROM Deficit 125  -SS    External Rotation AROM Deficit 31  -SS      User Key  (r) = Recorded By, (t) = Taken By, (c) = Cosigned By    Initials Name Provider Type    EDUARDA Maxwell, PT DPT Physical Therapist                            PT Assessment/Plan       17 1300       PT Assessment    Functional Limitations Limitation in home management;Limitations in community activities;Performance in  "leisure activities;Performance in self-care ADL;Performance in work activities  -     Impairments Dexterity;Endurance;Muscle strength;Pain;Posture;Range of motion  -     Assessment Comments Increased pain this date. Unsure if related to weather changes or increased therex. Motion continues to improve.   -SS     Rehab Potential Good   barrier: vertebral fxs, nerve damage  -     Patient/caregiver participated in establishment of treatment plan and goals Yes  -SS     Patient would benefit from skilled therapy intervention Yes  -SS     PT Plan    PT Frequency 2x/week  -SS     Predicted Duration of Therapy Intervention (days/wks) 3-4 weeks with further TBA  -SS     PT Plan Comments Continue POC. Add triceps extension next.  -SS       User Key  (r) = Recorded By, (t) = Taken By, (c) = Cosigned By    Initials Name Provider Type    SS Alfonzo Maxwell, PT DPT Physical Therapist                    Exercises       11/09/17 1300          Subjective Comments    Subjective Comments Pt. reported a subjective improvement of 25%. Said that \"today's not a good day to \" her overall improvement. Stated that \"I felt better Tuesday\". She hurt all day yesterday and woke up this morning with the same pain. Took pain pills and laid on heating pad today and yesterday but they haven't really helped.  -SS      Subjective Pain    Able to rate subjective pain? yes  -SS      Pre-Treatment Pain Level 6  -SS      Post-Treatment Pain Level 6  -SS      Exercise 1    Exercise Name 1 Pro2, Seat 7, U/LE, F/R  -SS      Cueing 1 Verbal  -SS      Time (Minutes) 1 10 mins  -SS      Additional Comments Level 3  -SS      Exercise 2    Exercise Name 2 Passive external rotation in TrueStretch  -      Cueing 2 Verbal  -SS      Sets 2 2  -SS      Reps 2 15  -SS      Time (Seconds) 2 2 sec hold  -SS      Exercise 3    Exercise Name 3 Passive flexion walk away in TrueStretch  -SS      Cueing 3 Verbal  -SS      Sets 3 3  -SS      Reps 3 15  -SS   "    Time (Seconds) 3 2 sec hold  -SS      Exercise 4    Exercise Name 4 Digiflex  -SS      Cueing 4 Verbal  -SS      Sets 4 3  -SS      Reps 4 15  -SS      Additional Comments Green  -SS      Exercise 5    Exercise Name 5 DB Elbow Flexion  -SS      Cueing 5 Verbal  -SS      Sets 5 3  -SS      Reps 5 10  -SS      Additional Comments 3 pounds  -SS      Exercise 6    Exercise Name 6 DB Right single arm chest press  -SS      Cueing 6 Verbal  -SS      Sets 6 3  -SS      Reps 6 10  -SS      Additional Comments 1 pound  -SS        User Key  (r) = Recorded By, (t) = Taken By, (c) = Cosigned By    Initials Name Provider Type    SS Alfonzo Maxwell, PT DPT Physical Therapist                               PT OP Goals       11/09/17 1300       PT Short Term Goals    STG Date to Achieve 11/22/17  -SS     STG 1 Increase standing R shoulder active flexion AROM to >/= 80 degrees  -SS     STG 1 Progress Met  -SS     STG 2 Increase supine R shoulder active flexion AROM to >/= 90 degrees  -SS     STG 2 Progress Met  -SS     STG 3 Increase standing R shoulder abduction AROM to >/= 60 degrees  -SS     STG 3 Progress Met  -SS     STG 4 Increase R shoulder extension AROM to >/= 21 degrees  -SS     STG 4 Progress Met  -SS     STG 5 Shoulder ER to be >/= 50 degrees with arm neutral and in supine 90/90  -SS     STG 5 Progress Ongoing  -SS     STG 6 Standing shoulder flexion AROM to >/= 130 deg  -SS     STG 6 Progress Ongoing  -SS     STG 7 Standing shoulder abduction AROM to >/= 115 deg  -SS     STG 7 Progress Met  -SS     Long Term Goals    LTG Date to Achieve 12/19/17  -SS     LTG 1 Independent with HEP  -SS     LTG 1 Progress Not Met  -SS     LTG 2 Standing R shoulder flexion AROM to be >/= 100 degrees  -SS     LTG 2 Progress Met  -SS     LTG 3 Supine R shoulder flexion AROM to be >/= 120 degrees  -SS     LTG 3 Progress Not Met  -SS     LTG 4 Standing R shoulder abduction AROM to be >/= 75 degrees  -SS     LTG 4 Progress Met  -SS      LTG 5 Report ability to reach to shoulder height to put away dishes  -SS     LTG 5 Progress Not Met  -SS     LTG 6 QuickDASH score to be </= 75  -SS     LTG 6 Progress Not Met  -SS     LTG 7 Standing shoulder flexion AROM to >/= 140 deg  -SS     LTG 7 Progress Ongoing  -SS     LTG 8 Standing shoulder abduction AROM to >/= 120 deg  -SS     LTG 8 Progress Met  -SS     Time Calculation    PT Goal Re-Cert Due Date 11/22/17  -SS       User Key  (r) = Recorded By, (t) = Taken By, (c) = Cosigned By    Initials Name Provider Type     Alfonzo Maxwell, MICHAELA DPT Physical Therapist                Therapy Education       11/09/17 1300          Therapy Education    Given HEP  -SS      Program Reinforced  -SS      How Provided Verbal  -SS      Provided to Patient  -SS      Level of Understanding Verbalized;Demonstrated  -SS        User Key  (r) = Recorded By, (t) = Taken By, (c) = Cosigned By    Initials Name Provider Type     Alfonzo Maxwell, MICHAELA DPT Physical Therapist                Time Calculation:   Start Time: 1304  Stop Time: 1406  Time Calculation (min): 62 min    Therapy Charges for Today     Code Description Service Date Service Provider Modifiers Qty    68172967753 HC PT THER PROC EA 15 MIN 11/9/2017 Alfonzo Maxwell PT DPT GP 3    72442109945 HC PT THER SUPP EA 15 MIN 11/9/2017 Alfonzo Maxwell, PT DPT GP 1                    Alfonzo Maxwell, PT, DPT, CHT  11/9/2017

## 2017-11-14 ENCOUNTER — HOSPITAL ENCOUNTER (OUTPATIENT)
Dept: PHYSICAL THERAPY | Facility: HOSPITAL | Age: 42
Setting detail: THERAPIES SERIES
Discharge: HOME | End: 2017-11-14

## 2017-11-14 DIAGNOSIS — M75.01 ADHESIVE CAPSULITIS OF RIGHT SHOULDER: Primary | ICD-10-CM

## 2017-11-14 DIAGNOSIS — M77.8 TENDINITIS OF RIGHT SHOULDER: ICD-10-CM

## 2017-11-14 DIAGNOSIS — M75.51 BURSITIS OF RIGHT SHOULDER: ICD-10-CM

## 2017-11-14 DIAGNOSIS — M75.01 ADHESIVE BURSITIS OF RIGHT SHOULDER: ICD-10-CM

## 2017-11-14 PROCEDURE — 97110 THERAPEUTIC EXERCISES: CPT | Performed by: PHYSICAL THERAPIST

## 2017-11-14 NOTE — THERAPY TREATMENT NOTE
Outpatient Physical Therapy Ortho Treatment Note  [facility]     Patient Name: Christal Holder  : 1975  MRN: [id number]  Today's Date: 2017      Visit Date: 2017  Attendance: 10/10 (no visit limit)  Subjective Improvement: 40%  Next MD Appt: PRN  Recert Date: 17      Therapy Diagnosis: 1) R shoulder adhesive capsulitis; 2) R shoulder weakness; 3) pain      Visit Dx:    ICD-10-CM ICD-9-CM   1. Adhesive capsulitis of right shoulder M75.01 726.0   2. Tendinitis of right shoulder M75.81 726.10   3. Adhesive bursitis of right shoulder M75.01 726.0   4. Bursitis of right shoulder M75.51 726.10       Patient Active Problem List   Diagnosis   • Suicide ideation   • Severe episode of recurrent major depressive disorder, without psychotic features        Past Medical History:   Diagnosis Date   • Anxiety    • Atelectasis    • Chronic pain disorder    • Compression fracture of body of thoracic vertebra     T6-T12   • COPD (chronic obstructive pulmonary disease)     due to trauma   • Depression    • Diabetes mellitus    • Glenoid fracture of shoulder    • Pneumothorax     due to rib fractures   • Rib fracture    • Scapular fracture         Past Surgical History:   Procedure Laterality Date   • CHOLECYSTECTOMY     • GLENOID OPEN REDUCTION INTERNAL FIXATION Right    • SCAPULA OPEN REDUCTION INTERNAL FIXATION     • TUBAL ABDOMINAL LIGATION               PT Ortho       17 1300    Subjective Comments    Subjective Comments Pt reported that she did not sleep well last night because of shoulder and back pain. Different sleeping positions bothered her in different ways. Sore/pain from last therapy session decreased around . 40% subjective improvement. Feels she is moving better overall.  -SS    Subjective Pain    Able to rate subjective pain? yes  -SS    Pre-Treatment Pain Level 4  -SS    Post-Treatment Pain Level 4  -SS      User Key  (r) = Recorded By, (t) = Taken By, (c) = Cosigned  By    Initials Name Provider Type     Alfonzo Maxwell, PT DPT Physical Therapist                            PT Assessment/Plan       11/14/17 1300       PT Assessment    Functional Limitations Limitation in home management;Limitations in community activities;Performance in leisure activities;Performance in self-care ADL;Performance in work activities  -     Impairments Dexterity;Endurance;Muscle strength;Pain;Posture;Range of motion  -     Assessment Comments Good effort. Some pain with strengthening activities this date. MHP helps treat pain.  -     Rehab Potential Good   barrier: vertebral fx, nerve damage  -     Patient/caregiver participated in establishment of treatment plan and goals Yes  -SS     Patient would benefit from skilled therapy intervention Yes  -SS     PT Plan    PT Frequency 2x/week  -     Predicted Duration of Therapy Intervention (days/wks) 3-4 weeks with further TBA  -     PT Plan Comments Recert next week. Monitor response to strengthening activities. Continue POC.  -SS       User Key  (r) = Recorded By, (t) = Taken By, (c) = Cosigned By    Initials Name Provider Type     Alfonzo Maxwell, PT DPT Physical Therapist                Modalities       11/14/17 1300          Moist Heat    MH Applied Yes  -SS      Location trunk and R shoulder  -SS      Rx Minutes 15 mins  -SS      MH Prior to Rx No  -SS      MH S/P Rx Yes  -SS        User Key  (r) = Recorded By, (t) = Taken By, (c) = Cosigned By    Initials Name Provider Type     Alfonzo Maxwell, PT DPT Physical Therapist                Exercises       11/14/17 1300          Subjective Comments    Subjective Comments Pt reported that she did not sleep well last night because of shoulder and back pain. Different sleeping positions bothered her in different ways. Sore/pain from last therapy session decreased around Sunday. 40% subjective improvement. Feels she is moving better overall.  -SS      Subjective Pain     Able to rate subjective pain? yes  -SS      Pre-Treatment Pain Level 4  -SS      Post-Treatment Pain Level 4  -SS      Exercise 1    Exercise Name 1 Pro2, Seat 7, U/LE, F/R  -SS      Cueing 1 Verbal  -SS      Time (Minutes) 1 10 mins  -SS      Exercise 2    Exercise Name 2 Passive flexion walkaway in TrueStretch  -SS      Cueing 2 Verbal  -SS      Sets 2 3  -SS      Reps 2 20  -SS      Time (Seconds) 2 2 sec hold  -SS      Exercise 3    Exercise Name 3 Passive external rotation in TrueStretch  -SS      Cueing 3 Verbal  -SS      Sets 3 3  -SS      Reps 3 15  -SS      Time (Seconds) 3 2 sec hold  -SS      Exercise 4    Exercise Name 4 Active flexion holding Med ball with both hands  -SS      Cueing 4 Demo;Verbal  -SS      Sets 4 1  -SS      Reps 4 20  -SS      Additional Comments 2 pound  -SS      Exercise 5    Exercise Name 5 R Supine DB Tricep Extension  -SS      Cueing 5 Tactile  -SS      Sets 5 2  -SS      Reps 5 10  -SS      Additional Comments 2 pound  -SS      Exercise 6    Exercise Name 6 Bilateral Supine DB Chest Press  -SS      Cueing 6 Demo  -SS      Sets 6 2  -SS      Reps 6 10  -SS      Additional Comments 1 pound  -SS      Exercise 7    Exercise Name 7 DB Elbow Flexion  -SS      Cueing 7 Verbal  -SS      Sets 7 3  -SS      Reps 7 15  -SS      Additional Comments 3 pound  -SS      Exercise 8    Exercise Name 8 Isometric Shoulder external rotation  -SS      Cueing 8 Tactile  -SS      Sets 8 2  -SS      Reps 8 10  -SS      Time (Seconds) 8 5 second holds  -SS        User Key  (r) = Recorded By, (t) = Taken By, (c) = Cosigned By    Initials Name Provider Type    SS Alfonzo Maxwell, PT DPT Physical Therapist                               PT OP Goals       11/14/17 1300       PT Short Term Goals    STG Date to Achieve 11/22/17  -SS     STG 1 Increase standing R shoulder active flexion AROM to >/= 80 degrees  -SS     STG 1 Progress Met  -SS     STG 2 Increase supine R shoulder active flexion AROM to >/=  90 degrees  -SS     STG 2 Progress Met  -SS     STG 3 Increase standing R shoulder abduction AROM to >/= 60 degrees  -SS     STG 3 Progress Met  -SS     STG 4 Increase R shoulder extension AROM to >/= 21 degrees  -SS     STG 4 Progress Met  -SS     STG 5 Shoulder ER to be >/= 50 degrees with arm neutral and in supine 90/90  -SS     STG 5 Progress Ongoing  -SS     STG 6 Standing shoulder flexion AROM to >/= 130 deg  -SS     STG 6 Progress Ongoing  -SS     STG 7 Standing shoulder abduction AROM to >/= 115 deg  -SS     STG 7 Progress Met  -SS     Long Term Goals    LTG Date to Achieve 12/19/17  -SS     LTG 1 Independent with HEP  -SS     LTG 1 Progress Not Met  -SS     LTG 2 Standing R shoulder flexion AROM to be >/= 100 degrees  -SS     LTG 2 Progress Met  -SS     LTG 3 Supine R shoulder flexion AROM to be >/= 120 degrees  -SS     LTG 3 Progress Not Met  -SS     LTG 4 Standing R shoulder abduction AROM to be >/= 75 degrees  -SS     LTG 4 Progress Met  -SS     LTG 5 Report ability to reach to shoulder height to put away dishes  -SS     LTG 5 Progress Not Met  -SS     LTG 6 QuickDASH score to be </= 75  -SS     LTG 6 Progress Not Met  -SS     LTG 7 Standing shoulder flexion AROM to >/= 140 deg  -SS     LTG 7 Progress Ongoing  -SS     LTG 8 Standing shoulder abduction AROM to >/= 120 deg  -SS     LTG 8 Progress Met  -SS     Time Calculation    PT Goal Re-Cert Due Date 11/22/17  -       User Key  (r) = Recorded By, (t) = Taken By, (c) = Cosigned By    Initials Name Provider Type    EDUARDA Maxwell, PT DPT Physical Therapist                Therapy Education       11/14/17 1300          Therapy Education    Given HEP  -SS      Program Reinforced  -SS      How Provided Verbal  -SS      Provided to Patient  -SS      Level of Understanding Verbalized;Demonstrated  -SS        User Key  (r) = Recorded By, (t) = Taken By, (c) = Cosigned By    Initials Name Provider Type    EDUARDA Maxwell, PT DPT Physical  Therapist                Time Calculation:   Start Time: 1300  Stop Time: 1357  Time Calculation (min): 57 min    Therapy Charges for Today     Code Description Service Date Service Provider Modifiers Qty    45924558423  PT THER PROC EA 15 MIN 11/14/2017 Alfonzo Maxwell, PT DPT GP 3    [id number]  PT THER SUPP EA 15 MIN 11/14/2017 Alfonzo Maxwell, PT DPT GP 1                    Alfonzo Maxwell, PT, DPT, CHT  11/14/2017

## 2017-11-16 ENCOUNTER — HOSPITAL ENCOUNTER (OUTPATIENT)
Dept: PHYSICAL THERAPY | Facility: HOSPITAL | Age: 42
Setting detail: THERAPIES SERIES
Discharge: HOME | End: 2017-11-16

## 2017-11-16 DIAGNOSIS — M75.01 ADHESIVE CAPSULITIS OF RIGHT SHOULDER: Primary | ICD-10-CM

## 2017-11-16 DIAGNOSIS — M77.8 TENDINITIS OF RIGHT SHOULDER: ICD-10-CM

## 2017-11-16 PROCEDURE — 97110 THERAPEUTIC EXERCISES: CPT | Performed by: PHYSICAL THERAPIST

## 2017-11-16 NOTE — THERAPY TREATMENT NOTE
Outpatient Physical Therapy Ortho Treatment Note  [facility]     Patient Name: Christal Holder  : 1975  MRN: [id number]  Today's Date: 2017      Visit Date: 2017  Attendance:  (no visit limit)  Subjective Improvement: 40%  Next MD Appt: PRN  Recert Date: 17      Therapy Diagnosis: 1) R shoulder adhesive capsulitis; 2) R shoulder weakness; 3) pain      Visit Dx:    ICD-10-CM ICD-9-CM   1. Adhesive capsulitis of right shoulder M75.01 726.0   2. Tendinitis of right shoulder M75.81 726.10       Patient Active Problem List   Diagnosis   • Suicide ideation   • Severe episode of recurrent major depressive disorder, without psychotic features        Past Medical History:   Diagnosis Date   • Anxiety    • Atelectasis    • Chronic pain disorder    • Compression fracture of body of thoracic vertebra     T6-T12   • COPD (chronic obstructive pulmonary disease)     due to trauma   • Depression    • Diabetes mellitus    • Glenoid fracture of shoulder    • Pneumothorax     due to rib fractures   • Rib fracture    • Scapular fracture         Past Surgical History:   Procedure Laterality Date   • CHOLECYSTECTOMY     • GLENOID OPEN REDUCTION INTERNAL FIXATION Right    • SCAPULA OPEN REDUCTION INTERNAL FIXATION     • TUBAL ABDOMINAL LIGATION               PT Ortho       17 1300    Right Shoulder    Flexion AROM Deficit 130 standing  -SS    Extension AROM Deficit 30  -SS    ABduction AROM Deficit 120  -SS    External Rotation AROM Deficit 31  -SS      17 1300    Subjective Comments    Subjective Comments Pt reported that she did not sleep well last night because of shoulder and back pain. Different sleeping positions bothered her in different ways. Sore/pain from last therapy session decreased around . 40% subjective improvement. Feels she is moving better overall.  -SS    Subjective Pain    Able to rate subjective pain? yes  -SS    Pre-Treatment Pain Level 4  -SS     Post-Treatment Pain Level 4  -      User Key  (r) = Recorded By, (t) = Taken By, (c) = Cosigned By    Initials Name Provider Type     Alfonzo Maxwell, PT DPT Physical Therapist                            PT Assessment/Plan       11/16/17 1300       PT Assessment    Functional Limitations Limitation in home management;Limitations in community activities;Performance in leisure activities;Performance in self-care ADL;Performance in work activities  -     Impairments Dexterity;Endurance;Muscle strength;Pain;Posture;Range of motion  -     Assessment Comments Low back pain with ball/wall shoulder flexion and shoulder pain with ball/wall CW/CCW.  -     Rehab Potential Good   barriers: vertebral fx, nerve damage  -     Patient/caregiver participated in establishment of treatment plan and goals Yes  -SS     Patient would benefit from skilled therapy intervention Yes  -SS     PT Plan    PT Frequency 2x/week  -     Predicted Duration of Therapy Intervention (days/wks) 3-4 weeks  -     PT Plan Comments Recert next week. Strengthening as sam. Continue ROM.  -       User Key  (r) = Recorded By, (t) = Taken By, (c) = Cosigned By    Initials Name Provider Type     Alfonzo Maxwell, PT DPT Physical Therapist                    Exercises       11/16/17 1300          Subjective Comments    Subjective Comments Pt reported pain in mid-back greater than shoulder today. Thinks that the weather may have influenced her increased pain. Laying in bed and on heating pad yesterday helped alleviate some of the pain. 50% subjective improvement. She feels she can move it better and just needs to build strength.  -      Subjective Pain    Able to rate subjective pain? yes  -SS      Pre-Treatment Pain Level 6  -SS      Post-Treatment Pain Level 4  -      Exercise 1    Exercise Name 1 Pro2, Seat 7, U/LE, F/R  -      Cueing 1 Verbal  -      Time (Minutes) 1 10 mins  -      Additional Comments Level 3  -SS       Exercise 2    Exercise Name 2 Passive flexion walkaway in TrueStretch  -SS      Cueing 2 Verbal  -SS      Sets 2 3  -SS      Reps 2 20  -SS      Time (Seconds) 2 2 sec hold  -SS      Exercise 3    Exercise Name 3 Passive external rotation in TrueStretch  -SS      Cueing 3 Verbal  -SS      Sets 3 3  -SS      Reps 3 15  -SS      Time (Seconds) 3 2 sec hold  -SS      Exercise 4    Exercise Name 4 Ball Wall Shoulder Flexion   -SS      Cueing 4 Verbal;Demo  -SS      Sets 4 1  -SS      Reps 4 15  -SS      Exercise 5    Exercise Name 5 Ball Wall Circles CW/CCW  -SS      Cueing 5 Demo  -SS      Sets 5 2  -SS      Reps 5 10  -SS      Exercise 6    Exercise Name 6 Bilateral Supine DB Chest Press  -SS      Cueing 6 Demo  -SS      Sets 6 3  -SS      Reps 6 10  -SS      Additional Comments 2 pound  -SS      Exercise 7    Exercise Name 7 Isometric Shoulder External Rotation  -SS      Cueing 7 Tactile  -SS      Sets 7 2  -SS      Reps 7 10  -SS      Time (Seconds) 7 5 second holds  -SS        User Key  (r) = Recorded By, (t) = Taken By, (c) = Cosigned By    Initials Name Provider Type    SS Alfonzo Maxwell, PT DPT Physical Therapist                               PT OP Goals       11/16/17 1300 11/16/17 1200    PT Short Term Goals    STG Date to Achieve 11/22/17  -SS     STG 1 Increase standing R shoulder active flexion AROM to >/= 80 degrees  -SS     STG 1 Progress Met  -SS     STG 2 Increase supine R shoulder active flexion AROM to >/= 90 degrees  -SS     STG 2 Progress Met  -SS     STG 3 Increase standing R shoulder abduction AROM to >/= 60 degrees  -SS     STG 3 Progress Met  -SS     STG 4 Increase R shoulder extension AROM to >/= 21 degrees  -SS     STG 4 Progress Met  -SS     STG 5 Shoulder ER to be >/= 50 degrees with arm neutral and in supine 90/90  -SS     STG 5 Progress Ongoing  -SS     STG 6 Standing shoulder flexion AROM to >/= 130 deg  -SS     STG 6 Progress Ongoing  -SS     STG 7 Standing shoulder abduction AROM  to >/= 115 deg  -SS     STG 7 Progress Met  -SS     Long Term Goals    LTG Date to Achieve 12/19/17  -SS     LTG 1 Independent with HEP  -SS     LTG 1 Progress Not Met  -SS     LTG 2 Standing R shoulder flexion AROM to be >/= 100 degrees  -SS     LTG 2 Progress Met  -SS     LTG 3 Supine R shoulder flexion AROM to be >/= 120 degrees  -SS     LTG 3 Progress Not Met  -SS     LTG 4 Standing R shoulder abduction AROM to be >/= 75 degrees  -SS     LTG 4 Progress Met  -SS     LTG 5 Report ability to reach to shoulder height to put away dishes  -SS     LTG 5 Progress Not Met  -SS     LTG 6 QuickDASH score to be </= 75  -SS     LTG 6 Progress Not Met  -SS     LTG 7 Standing shoulder flexion AROM to >/= 140 deg  -SS     LTG 7 Progress Ongoing  -SS     LTG 8 Standing shoulder abduction AROM to >/= 120 deg  -SS     LTG 8 Progress Met  -SS     Time Calculation    PT Goal Re-Cert Due Date  11/22/17  -SS      User Key  (r) = Recorded By, (t) = Taken By, (c) = Cosigned By    Initials Name Provider Type     Alfonzo Maxwell, PT DPT Physical Therapist                Therapy Education       11/16/17 1300          Therapy Education    Given HEP  -SS      Program Reinforced  -SS      How Provided Verbal  -SS      Provided to Patient  -SS      Level of Understanding Verbalized;Demonstrated  -SS        User Key  (r) = Recorded By, (t) = Taken By, (c) = Cosigned By    Initials Name Provider Type     Alfonzo Maxwell, MICHAELA DPT Physical Therapist                Time Calculation:   Start Time: 1300  Stop Time: 1403  Time Calculation (min): 63 min    Therapy Charges for Today     Code Description Service Date Service Provider Modifiers Qty    04123209494 HC PT THER PROC EA 15 MIN 11/16/2017 Alfonzo Maxwell, PT DPT GP 3    21256180974 HC PT THER SUPP EA 15 MIN 11/16/2017 Alfonzo Maxewll, PT DPT GP 1                    Alfonzo Maxwell, PT, DPT, CHT  11/16/2017

## 2017-11-22 ENCOUNTER — HOSPITAL ENCOUNTER (OUTPATIENT)
Dept: PHYSICAL THERAPY | Facility: HOSPITAL | Age: 42
Setting detail: THERAPIES SERIES
Discharge: HOME | End: 2017-11-22

## 2017-11-22 DIAGNOSIS — M75.01 ADHESIVE BURSITIS OF RIGHT SHOULDER: ICD-10-CM

## 2017-11-22 DIAGNOSIS — M75.01 ADHESIVE CAPSULITIS OF RIGHT SHOULDER: Primary | ICD-10-CM

## 2017-11-22 DIAGNOSIS — M77.8 TENDINITIS OF RIGHT SHOULDER: ICD-10-CM

## 2017-11-22 PROCEDURE — 97110 THERAPEUTIC EXERCISES: CPT | Performed by: PHYSICAL THERAPIST

## 2017-11-22 NOTE — THERAPY EVALUATION
Outpatient Physical Therapy Ortho Progress Note  [facility]     Patient Name: Christal Holder  : 1975  MRN: [id number]  Today's Date: 2017      Visit Date: 2017  Attendance:  (no visit limit)  Subjective Improvement: 50%  Next MD Appt: PRN  Recert Date: 17      Therapy Diagnosis: 1) R shoulder adhesive capsulitis; 2) R shoulder weakness; 3) pain           Past Medical History:   Diagnosis Date   • Anxiety    • Atelectasis    • Chronic pain disorder    • Compression fracture of body of thoracic vertebra     T6-T12   • COPD (chronic obstructive pulmonary disease)     due to trauma   • Depression    • Diabetes mellitus    • Glenoid fracture of shoulder    • Pneumothorax     due to rib fractures   • Rib fracture    • Scapular fracture         Past Surgical History:   Procedure Laterality Date   • CHOLECYSTECTOMY     • GLENOID OPEN REDUCTION INTERNAL FIXATION Right    • SCAPULA OPEN REDUCTION INTERNAL FIXATION     • TUBAL ABDOMINAL LIGATION         Visit Dx:     ICD-10-CM ICD-9-CM   1. Adhesive capsulitis of right shoulder M75.01 726.0   2. Tendinitis of right shoulder M75.81 726.10   3. Adhesive bursitis of right shoulder M75.01 726.0     Changes in Medications: none noted  Changes in MD Orders: none  Number of Work Days Lost: n/a                PT Ortho       17 0900    Right Shoulder    Flexion AROM Deficit 130; pain  -SS    Extension AROM Deficit 40  -SS    ABduction AROM Deficit 116  -SS    External Rotation AROM Deficit 35 standing and supine neutral;   -SS      User Key  (r) = Recorded By, (t) = Taken By, (c) = Cosigned By    Initials Name Provider Type    SS Alfonzo Maxwell, PT DPT Physical Therapist                            Therapy Education       17 1000          Therapy Education    Given HEP  -SS      Program Progressed   supine holds at 90 deg, DB supine flexion   -SS      How Provided Verbal  -SS      Provided to Patient  -SS      Level of  Understanding Verbalized;Demonstrated  -SS        User Key  (r) = Recorded By, (t) = Taken By, (c) = Cosigned By    Initials Name Provider Type    SS Alfonzo Maxwell, PT DPT Physical Therapist                PT OP Goals       11/22/17 0900       PT Short Term Goals    STG Date to Achieve 11/22/17   further deferred  -SS     STG 1 Increase standing R shoulder active flexion AROM to >/= 80 degrees  -SS     STG 1 Progress Met  -SS     STG 2 Increase supine R shoulder active flexion AROM to >/= 90 degrees  -SS     STG 2 Progress Met  -SS     STG 3 Increase standing R shoulder abduction AROM to >/= 60 degrees  -SS     STG 3 Progress Met  -SS     STG 4 Increase R shoulder extension AROM to >/= 21 degrees  -SS     STG 4 Progress Met  -SS     STG 5 Shoulder ER to be >/= 50 degrees with arm neutral and in supine 90/90  -SS     STG 5 Progress Not Met  -SS     STG 6 Standing shoulder flexion AROM to >/= 130 deg  -SS     STG 6 Progress Met  -SS     STG 7 Standing shoulder abduction AROM to >/= 115 deg  -SS     STG 7 Progress Met  -SS     Long Term Goals    LTG Date to Achieve 12/19/17  -     LTG 1 Independent with HEP  -SS     LTG 1 Progress Not Met  -SS     LTG 2 Standing R shoulder flexion AROM to be >/= 100 degrees  -SS     LTG 2 Progress Met  -SS     LTG 3 Supine R shoulder flexion AROM to be >/= 120 degrees  -SS     LTG 3 Progress Met  -SS     LTG 4 Standing R shoulder abduction AROM to be >/= 75 degrees  -SS     LTG 4 Progress Met  -SS     LTG 5 Report ability to reach to shoulder height to put away dishes  -SS     LTG 5 Progress Not Met  -SS     LTG 6 QuickDASH score to be </= 75  -SS     LTG 6 Progress Not Met  -SS     LTG 7 Standing shoulder flexion AROM to >/= 140 deg  -SS     LTG 7 Progress Ongoing  -SS     LTG 8 Standing shoulder abduction AROM to >/= 120 deg  -SS     LTG 8 Progress Met  -     Time Calculation    PT Goal Re-Cert Due Date 12/13/17  -       User Key  (r) = Recorded By, (t) = Taken By, (c)  = Cosigned By    Initials Name Provider Type     Alfonzo Maxwell, PT DPT Physical Therapist                PT Assessment/Plan       11/22/17 0900       PT Assessment    Functional Limitations Limitation in home management;Limitations in community activities;Performance in leisure activities;Performance in self-care ADL;Performance in work activities  -     Impairments Dexterity;Endurance;Muscle strength;Pain;Posture;Range of motion  -     Assessment Comments Patient was more sore with therex but heat helped decrease pain. She is progressing with motion. Needs to work on additional strengthening.  -     Rehab Potential Good   barrier: vertebral fxs, nerve damage  -     Patient/caregiver participated in establishment of treatment plan and goals Yes  -SS     Patient would benefit from skilled therapy intervention Yes  -SS     PT Plan    PT Frequency 2x/week  -     Predicted Duration of Therapy Intervention (days/wks) 3-4 weeks  -     PT Plan Comments Focus on strength and endurance of the R UE while maintaining ROM.  -       User Key  (r) = Recorded By, (t) = Taken By, (c) = Cosigned By    Initials Name Provider Type     Alfonzo Maxwell, PT DPT Physical Therapist                Modalities       11/22/17 0900          Moist Heat    Location trunk and R shoulder  -      Rx Minutes 15 mins  -SS      MH Prior to Rx No  -SS      MH S/P Rx Yes  -        User Key  (r) = Recorded By, (t) = Taken By, (c) = Cosigned By    Initials Name Provider Type     Alfonzo Maxwell, PT DPT Physical Therapist              Exercises       11/22/17 0900          Subjective Comments    Subjective Comments More stiff today. Not loosening up as easily. Motion is 75% improved. Continues to be weak. 50% overall improvement. No medication changes.  -      Subjective Pain    Able to rate subjective pain? yes  -SS      Pre-Treatment Pain Level 4  -SS      Post-Treatment Pain Level 4  -SS      Exercise 1     Exercise Name 1 Pro2, Seat 7, ROM/strength  -SS      Cueing 1 Verbal  -SS      Time (Minutes) 1 10 mins  -SS      Additional Comments Level 3  -SS      Exercise 2    Exercise Name 2 Passive flexion walkaway in [employer]  -SS      Cueing 2 Verbal  -SS      Sets 2 4  -SS      Reps 2 15  -SS      Time (Seconds) 2 2 sec hold  -SS      Exercise 3    Exercise Name 3 Ball/wall circles CW/CCW  -SS      Cueing 3 Verbal  -SS      Sets 3 3  -SS      Reps 3 10  -SS      Exercise 4    Exercise Name 4 Ball/wall boxes CW/CCW  -SS      Cueing 4 Verbal;Demo  -SS      Sets 4 2  -SS      Reps 4 10  -SS      Exercise 5    Exercise Name 5 Supine B med ball flexion  -SS      Cueing 5 Verbal  -SS      Sets 5 2  -SS      Reps 5 10  -SS      Additional Comments 2#  -SS      Exercise 6    Exercise Name 6 Supine shoulder hold at 90 degrees flexion  -SS      Cueing 6 Verbal  -SS      Sets 6 1  -SS      Reps 6 5  -SS      Time (Seconds) 6 10 sec hold  -SS        User Key  (r) = Recorded By, (t) = Taken By, (c) = Cosigned By    Initials Name Provider Type    SS Alfonzo Maxwell, PT DPT Physical Therapist                              Time Calculation:   Start Time: 0857  Stop Time: 0952  Time Calculation (min): 55 min     Therapy Charges for Today     Code Description Service Date Service Provider Modifiers Qty    41954943123 HC PT THER PROC EA 15 MIN 11/22/2017 Alfonzo Maxwell PT DPT GP 3    71619009473 HC PT THER SUPP EA 15 MIN 11/22/2017 Alfonzo Maxwell PT DPT GP 1                    Alfonzo Maxwell, PT, DPT, CHT  11/22/2017

## 2017-11-28 ENCOUNTER — HOSPITAL ENCOUNTER (OUTPATIENT)
Dept: PHYSICAL THERAPY | Facility: HOSPITAL | Age: 42
Setting detail: THERAPIES SERIES
Discharge: HOME | End: 2017-11-28

## 2017-11-28 DIAGNOSIS — M75.01 ADHESIVE CAPSULITIS OF RIGHT SHOULDER: Primary | ICD-10-CM

## 2017-11-28 PROCEDURE — 97110 THERAPEUTIC EXERCISES: CPT | Performed by: PHYSICAL THERAPIST

## 2017-11-29 NOTE — THERAPY TREATMENT NOTE
Outpatient Physical Therapy Ortho Treatment Note  [facility]     Patient Name: Christal Holder  : 1975  MRN: [id number]  Today's Date: 2017      Visit Date: 2017  Attendance:  (no visit limit)  Subjective Improvement: 60%  Next MD Appt: PRN  Recert Date: 17      Therapy Diagnosis: 1) R shoulder adhesive capsulitis; 2) R shoulder weakness; 3) pain      Visit Dx:    ICD-10-CM ICD-9-CM   1. Adhesive capsulitis of right shoulder M75.01 726.0       Patient Active Problem List   Diagnosis   • Suicide ideation   • Severe episode of recurrent major depressive disorder, without psychotic features        Past Medical History:   Diagnosis Date   • Anxiety    • Atelectasis    • Chronic pain disorder    • Compression fracture of body of thoracic vertebra     T6-T12   • COPD (chronic obstructive pulmonary disease)     due to trauma   • Depression    • Diabetes mellitus    • Glenoid fracture of shoulder    • Pneumothorax     due to rib fractures   • Rib fracture    • Scapular fracture         Past Surgical History:   Procedure Laterality Date   • CHOLECYSTECTOMY     • GLENOID OPEN REDUCTION INTERNAL FIXATION Right    • SCAPULA OPEN REDUCTION INTERNAL FIXATION     • TUBAL ABDOMINAL LIGATION                     PT Assessment/Plan       17 1300       PT Assessment    Functional Limitations Limitation in home management;Limitations in community activities;Performance in leisure activities;Performance in self-care ADL;Performance in work activities  -SS (r) MD (t) SS (c)     Impairments Dexterity;Endurance;Muscle strength;Pain;Posture;Range of motion  -SS (r) MD (t) SS (c)     Assessment Comments Pt had minimal pain with strengthening exercises. Increased warmth of weather appeared to have helped minimize overall pain.  -SS (r) MD (t) SS (c)     Rehab Potential Good   barrier: vertebral fxs, nerve damage  -EDUARDA (r) MD (t) SS (c)     Patient/caregiver participated in establishment of  "treatment plan and goals Yes  -SS (r) MD (t) SS (c)     Patient would benefit from skilled therapy intervention Yes  -SS (r) MD (t) SS (c)     PT Plan    PT Frequency 2x/week  -SS (r) MD (t) SS (c)     Predicted Duration of Therapy Intervention (days/wks) 3-4 weeks  -EDUARDA (r) MD (t) SS (c)     PT Plan Comments Continue with POC incorporating more theraband strengthening exercises.  -SS (r) MD (t) SS (c)       User Key  (r) = Recorded By, (t) = Taken By, (c) = Cosigned By    Initials Name Provider Type    SS Alfonzo Maxwell, PT DPT Physical Therapist    MD Madie Angel, PT Student PT Student                Modalities       11/28/17 1300          Moist Heat    MH Applied Yes  -SS (r) MD (t) SS (c)      Location trunk and R shoulder  -EDUARDA (r) MD (t) SS (c)      Rx Minutes 15 mins  -EDUARDA Starkr) MD (t) SS (c)      MH Prior to Rx No  -SS (r) MD (t) SS (c)      MH S/P Rx Yes  -SS (r) MD (t) SS (c)        User Key  (r) = Recorded By, (t) = Taken By, (c) = Cosigned By    Initials Name Provider Type    SS Alfonzo Maxwell, PT DPT Physical Therapist    MD Madie Angel, PT Student PT Student                Exercises       11/28/17 1300          Subjective Comments    Subjective Comments Not in too much pain today. Weather is warm which seems to make her feel better. 60% subjective improvement. Still needs to improve on strength. Pt reported that she feel like she has \"maxed out on my movement.\"  -SS (r) MD (t) SS (c)      Subjective Pain    Able to rate subjective pain? yes  -SS (r) MD (t) SS (c)      Pre-Treatment Pain Level 3  -SS (r) MD (t) SS (c)      Post-Treatment Pain Level 3  -SS (r) MD (t) SS (c)      Exercise 1    Exercise Name 1 Pro2, Seat 7, ROM/strength  -SS (r) MD (t) SS (c)      Cueing 1 Verbal  -EDUARDA (r) MD (t) SS (c)      Time (Minutes) 1 10 mins  -SS (r) MD (t) SS (c)      Additional Comments Level 3.5  -SS (r) MD (t) EDUARDA (c)      Exercise 2    Exercise Name 2 Passive flexion walkaway in [facility]  -SS (r) " MD (t) SS (c)      Cueing 2 Verbal  -SS (r) MD (t) SS (c)      Sets 2 4  -SS (r) MD (t) SS (c)      Reps 2 15  -SS (r) MD (t) SS (c)      Time (Seconds) 2 2 sec hold  -SS (r) MD (t) SS (c)      Exercise 3    Exercise Name 3 Passive external rotation in [employer]  -SS (r) MD (t) SS (c)      Cueing 3 Verbal  -SS (r) MD (t) SS (c)      Sets 3 3  -SS (r) MD (t) SS (c)      Reps 3 10  -SS (r) MD (t) SS (c)      Time (Seconds) 3 2 sec hold  -SS (r) MD (t) SS (c)      Exercise 4    Exercise Name 4 Standing DB Elbow Flexion  -SS (r) MD (t) SS (c)      Cueing 4 Demo  -SS (r) MD (t) SS (c)      Sets 4 3  -SS (r) MD (t) SS (c)      Reps 4 10  -SS (r) MD (t) SS (c)      Additional Comments 2 pound  -SS (r) MD (t) SS (c)      Exercise 5    Exercise Name 5 Bilateral Supine DB Chest Press  -SS (r) MD (t) SS (c)      Cueing 5 Verbal  -SS (r) MD (t) SS (c)      Sets 5 4  -SS (r) MD (t) SS (c)      Reps 5 10  -SS (r) MD (t) SS (c)      Additional Comments 2#  -SS (r) MD (t) SS (c)      Exercise 6    Exercise Name 6 Isometric Shoulder Extension  -SS (r) MD (t) SS (c)      Cueing 6 Tactile  -SS (r) MD (t) SS (c)      Sets 6 2  -SS (r) MD (t) SS (c)      Reps 6 10  -SS (r) MD (t) SS (c)      Time (Seconds) 6 5 second hold  -SS (r) MD (t) SS (c)      Exercise 7    Exercise Name 7 T-Band Shoulder External Rotation  -SS (r) MD (t) SS (c)      Cueing 7 Demo  -SS (r) MD (t) SS (c)      Sets 7 3  -SS (r) MD (t) SS (c)      Reps 7 10  -SS (r) MD (t) SS (c)      Additional Comments Yellow  -SS (r) MD (t) SS (c)        User Key  (r) = Recorded By, (t) = Taken By, (c) = Cosigned By    Initials Name Provider Type    SS Alfonzo Maxwell, PT DPT Physical Therapist    MD Madie Angel, PT Student PT Student                               PT OP Goals       11/28/17 1300       PT Short Term Goals    STG Date to Achieve --   further deferred  -SS     Long Term Goals    LTG Date to Achieve 12/19/17  -EDUARDA (r) MD (t) SS (c)     LTG 1 Independent with  HEP  -SS (r) MD (t) SS (c)     LTG 1 Progress Not Met  -SS (r) MD (t) SS (c)     LTG 2 Standing R shoulder flexion AROM to be >/= 100 degrees  -SS (r) MD (t) SS (c)     LTG 2 Progress Met  -SS (r) MD (t) SS (c)     LTG 3 Supine R shoulder flexion AROM to be >/= 120 degrees  -SS (r) MD (t) SS (c)     LTG 3 Progress Met  -SS (r) MD (t) SS (c)     LTG 4 Standing R shoulder abduction AROM to be >/= 75 degrees  -SS (r) MD (t) SS (c)     LTG 4 Progress Met  -SS (r) MD (t) SS (c)     LTG 5 Report ability to reach to shoulder height to put away dishes  -SS (r) MD (t) SS (c)     LTG 5 Progress Not Met  -SS (r) MD (t) SS (c)     LTG 6 QuickDASH score to be </= 75  -SS (r) MD (t) SS (c)     LTG 6 Progress Not Met  -SS (r) MD (t) SS (c)     LTG 7 Standing shoulder flexion AROM to >/= 140 deg  -SS (r) MD (t) SS (c)     LTG 7 Progress Ongoing  -SS (r) MD (t) SS (c)     LTG 8 Standing shoulder abduction AROM to >/= 120 deg  -SS (r) MD (t) SS (c)     LTG 8 Progress Met  -SS (r) MD (t) SS (c)     Time Calculation    PT Goal Re-Cert Due Date 12/13/17  -       User Key  (r) = Recorded By, (t) = Taken By, (c) = Cosigned By    Initials Name Provider Type    SS Alfonzo Maxwell, PT DPT Physical Therapist    MD Madie Angel, PT Student PT Student                    Time Calculation:   Start Time: 1303  Stop Time: 1408  Time Calculation (min): 65 min    Therapy Charges for Today     Code Description Service Date Service Provider Modifiers Qty    34005743822 HC PT THER PROC EA 15 MIN 11/28/2017 Alfonzo Maxwell, PT DPT GP 3    51019247859 HC PT THER SUPP EA 15 MIN 11/28/2017 Alfonzo Maxwell, PT DPT GP 1                    Alfonzo Maxwell, PT, DPT, CHT  11/28/2017

## 2017-11-30 ENCOUNTER — HOSPITAL ENCOUNTER (OUTPATIENT)
Dept: PHYSICAL THERAPY | Facility: HOSPITAL | Age: 42
Setting detail: THERAPIES SERIES
Discharge: HOME | End: 2017-11-30

## 2017-11-30 DIAGNOSIS — M77.8 TENDINITIS OF RIGHT SHOULDER: ICD-10-CM

## 2017-11-30 DIAGNOSIS — M75.01 ADHESIVE CAPSULITIS OF RIGHT SHOULDER: Primary | ICD-10-CM

## 2017-11-30 DIAGNOSIS — M75.01 ADHESIVE BURSITIS OF RIGHT SHOULDER: ICD-10-CM

## 2017-11-30 PROCEDURE — 97110 THERAPEUTIC EXERCISES: CPT | Performed by: PHYSICAL THERAPIST

## 2017-12-05 ENCOUNTER — HOSPITAL ENCOUNTER (OUTPATIENT)
Dept: PHYSICAL THERAPY | Facility: HOSPITAL | Age: 42
Setting detail: THERAPIES SERIES
Discharge: HOME | End: 2017-12-05

## 2017-12-05 DIAGNOSIS — M75.01 ADHESIVE CAPSULITIS OF RIGHT SHOULDER: Primary | ICD-10-CM

## 2017-12-05 DIAGNOSIS — M75.01 ADHESIVE BURSITIS OF RIGHT SHOULDER: ICD-10-CM

## 2017-12-05 DIAGNOSIS — M77.8 TENDINITIS OF RIGHT SHOULDER: ICD-10-CM

## 2017-12-05 PROCEDURE — 97110 THERAPEUTIC EXERCISES: CPT | Performed by: PHYSICAL THERAPIST

## 2017-12-06 NOTE — THERAPY TREATMENT NOTE
Outpatient Physical Therapy Ortho Treatment Note  [facility]     Patient Name: Christal Holder  : 1975  MRN: [id number]  Today's Date: 2017      Visit Date: 2017  Attendance: 15/15 (no visit limit)  Subjective Improvement: 60%  Next MD Appt: PRN  Recert Date: 17      Therapy Diagnosis: 1) R shoulder adhesive capsulitis; 2) R shoulder weakness; 3) pain      Visit Dx:    ICD-10-CM ICD-9-CM   1. Adhesive capsulitis of right shoulder M75.01 726.0   2. Tendinitis of right shoulder M75.81 726.10   3. Adhesive bursitis of right shoulder M75.01 726.0       Patient Active Problem List   Diagnosis   • Suicide ideation   • Severe episode of recurrent major depressive disorder, without psychotic features        Past Medical History:   Diagnosis Date   • Anxiety    • Atelectasis    • Chronic pain disorder    • Compression fracture of body of thoracic vertebra     T6-T12   • COPD (chronic obstructive pulmonary disease)     due to trauma   • Depression    • Diabetes mellitus    • Glenoid fracture of shoulder    • Pneumothorax     due to rib fractures   • Rib fracture    • Scapular fracture         Past Surgical History:   Procedure Laterality Date   • CHOLECYSTECTOMY     • GLENOID OPEN REDUCTION INTERNAL FIXATION Right    • SCAPULA OPEN REDUCTION INTERNAL FIXATION     • TUBAL ABDOMINAL LIGATION               PT Ortho       17 1300    Right Shoulder    Flexion AROM Deficit 93  -SS      User Key  (r) = Recorded By, (t) = Taken By, (c) = Cosigned By    Initials Name Provider Type     Alfonzo Maxwell, PT DPT Physical Therapist                            PT Assessment/Plan       17 1300       PT Assessment    Functional Limitations Limitation in home management;Limitations in community activities;Performance in leisure activities;Performance in self-care ADL;Performance in work activities  -     Impairments Dexterity;Endurance;Muscle strength;Pain;Posture;Range of motion  -      Assessment Comments Loss of ROM may be due to weather change. Patient more painful. She was 15 minutes late to the clinic due to lost tract of time.  -SS     Rehab Potential Good   barrier: vertebral fxs, nerve damage  -SS     Patient/caregiver participated in establishment of treatment plan and goals Yes  -SS     Patient would benefit from skilled therapy intervention Yes  -SS     PT Plan    PT Frequency 2x/week  -SS     Predicted Duration of Therapy Intervention (days/wks) 3-4 weeks  -SS     PT Plan Comments Continue POC. Monitor motion to prevent relapse.  -SS       User Key  (r) = Recorded By, (t) = Taken By, (c) = Cosigned By    Initials Name Provider Type    SS Alfonzo Maxwell, PT DPT Physical Therapist                    Exercises       12/05/17 1300          Subjective Comments    Subjective Comments Noticed last week that she was having trouble raising her R shoulder. Sharp pain in the thoracic spine today.   -SS      Subjective Pain    Able to rate subjective pain? yes  -SS      Pre-Treatment Pain Level 6  -SS      Post-Treatment Pain Level 6  -SS      Exercise 1    Exercise Name 1 Pro2, Seat 7, U/LE, F/R  -SS      Cueing 1 Verbal  -SS      Time (Minutes) 1 10 mins  -SS      Additional Comments Level 4  -SS      Exercise 2    Exercise Name 2 Passive flexion & ER in [employer]  -SS      Cueing 2 Verbal  -SS      Reps 2 20   each  -SS      Exercise 3    Exercise Name 3 T-band shoulder ER  -SS      Cueing 3 Verbal  -SS      Sets 3 1  -SS      Reps 3 10  -SS      Additional Comments red  -SS      Exercise 4    Exercise Name 4 T-band Low Row  -SS      Cueing 4 Verbal  -SS      Sets 4 3  -SS      Reps 4 10  -SS      Additional Comments red  -SS      Exercise 5    Exercise Name 5 T-band Long Pull  -SS      Cueing 5 Verbal  -SS      Sets 5 3  -SS      Reps 5 10  -SS      Additional Comments red  -SS      Exercise 6    Exercise Name 6 Pulley flexion  -SS      Cueing 6 Verbal  -SS      Sets 6 1  -SS       Reps 6 20  -SS        User Key  (r) = Recorded By, (t) = Taken By, (c) = Cosigned By    Initials Name Provider Type    EDUARDA Maxwell, PT DPT Physical Therapist                               PT OP Goals       12/05/17 1300       PT Short Term Goals    STG Date to Achieve --   further deferred  -SS     Long Term Goals    LTG Date to Achieve 12/19/17  -SS     LTG 1 Independent with HEP  -SS     LTG 1 Progress Not Met  -SS     LTG 2 Standing R shoulder flexion AROM to be >/= 100 degrees  -SS     LTG 2 Progress Met  -SS     LTG 3 Supine R shoulder flexion AROM to be >/= 120 degrees  -SS     LTG 3 Progress Met  -SS     LTG 4 Standing R shoulder abduction AROM to be >/= 75 degrees  -SS     LTG 4 Progress Met  -SS     LTG 5 Report ability to reach to shoulder height to put away dishes  -SS     LTG 5 Progress Not Met  -SS     LTG 6 QuickDASH score to be </= 75  -SS     LTG 6 Progress Not Met  -SS     LTG 7 Standing shoulder flexion AROM to >/= 140 deg  -SS     LTG 7 Progress Ongoing  -SS     LTG 8 Standing shoulder abduction AROM to >/= 120 deg  -SS     LTG 8 Progress Met  -SS     Time Calculation    PT Goal Re-Cert Due Date 12/13/17  -SS       User Key  (r) = Recorded By, (t) = Taken By, (c) = Cosigned By    Initials Name Provider Type     Alfonzo Maxwell, PT DPT Physical Therapist                Therapy Education       12/05/17 1300          Therapy Education    Given HEP  -SS      Program Progressed   supine holds at 90 deg, DB supine flexion   -SS      How Provided Verbal  -SS      Provided to Patient  -SS      Level of Understanding Verbalized;Demonstrated  -SS        User Key  (r) = Recorded By, (t) = Taken By, (c) = Cosigned By    Initials Name Provider Type    EDUARDA Maxwell, PT DPT Physical Therapist                Time Calculation:   Start Time: 1315  Stop Time: 1404  Time Calculation (min): 49 min    Therapy Charges for Today     Code Description Service Date Service Provider Modifiers  Qty    22124915426  PT THER PROC EA 15 MIN 12/5/2017 Alfonzo Maxwell, PT DPT GP 2    24100151718 HC PT THER SUPP EA 15 MIN 12/5/2017 Alfonzo Maxwell, PT DPT GP 1                    Alfonzo Maxwell, PT, DPT, CHT  12/5/2017

## 2017-12-07 ENCOUNTER — HOSPITAL ENCOUNTER (OUTPATIENT)
Dept: PHYSICAL THERAPY | Facility: HOSPITAL | Age: 42
Setting detail: THERAPIES SERIES
Discharge: HOME | End: 2017-12-07

## 2017-12-07 DIAGNOSIS — M75.51 BURSITIS OF RIGHT SHOULDER: ICD-10-CM

## 2017-12-07 DIAGNOSIS — M75.01 ADHESIVE CAPSULITIS OF RIGHT SHOULDER: Primary | ICD-10-CM

## 2017-12-07 DIAGNOSIS — M75.01 ADHESIVE BURSITIS OF RIGHT SHOULDER: ICD-10-CM

## 2017-12-07 DIAGNOSIS — M77.8 TENDINITIS OF RIGHT SHOULDER: ICD-10-CM

## 2017-12-07 PROCEDURE — 97110 THERAPEUTIC EXERCISES: CPT | Performed by: PHYSICAL THERAPIST

## 2017-12-08 NOTE — THERAPY TREATMENT NOTE
Outpatient Physical Therapy Ortho Treatment Note  [facility]     Patient Name: Christal Holder  : 1975  MRN: [id number]  Today's Date: 2017      Visit Date: 2017  Attendance:  (no visit limit)  Subjective Improvement: 60%  Next MD Appt: PRN  Recert Date: 17      Therapy Diagnosis: 1) R shoulder adhesive capsulitis; 2) R shoulder weakness; 3) pain      Visit Dx:    ICD-10-CM ICD-9-CM   1. Adhesive capsulitis of right shoulder M75.01 726.0   2. Tendinitis of right shoulder M75.81 726.10   3. Adhesive bursitis of right shoulder M75.01 726.0   4. Bursitis of right shoulder M75.51 726.10       Patient Active Problem List   Diagnosis   • Suicide ideation   • Severe episode of recurrent major depressive disorder, without psychotic features        Past Medical History:   Diagnosis Date   • Anxiety    • Atelectasis    • Chronic pain disorder    • Compression fracture of body of thoracic vertebra     T6-T12   • COPD (chronic obstructive pulmonary disease)     due to trauma   • Depression    • Diabetes mellitus    • Glenoid fracture of shoulder    • Pneumothorax     due to rib fractures   • Rib fracture    • Scapular fracture         Past Surgical History:   Procedure Laterality Date   • CHOLECYSTECTOMY     • GLENOID OPEN REDUCTION INTERNAL FIXATION Right    • SCAPULA OPEN REDUCTION INTERNAL FIXATION     • TUBAL ABDOMINAL LIGATION                               PT Assessment/Plan       17 1300       PT Assessment    Functional Limitations Limitation in home management;Limitations in community activities;Performance in leisure activities;Performance in self-care ADL;Performance in work activities  -SS (r) MD (t) SS (c)     Impairments Dexterity;Endurance;Muscle strength;Pain;Posture;Range of motion  -SS (r) MD (t) SS (c)     Assessment Comments Patient had more difficulty with ROM exercises and presented with more pain. Had decreased endurance during exercises today. Decreased  intensity of strengthening exercises due to increased pain and loss of motion.  -SS     Rehab Potential Good   Good (barrier: vertebral fxs, nerve damage)  -EDUARDA (r) MD (t) SS (c)     Patient/caregiver participated in establishment of treatment plan and goals Yes  -EDUARDA (r) MD (t) SS (c)     Patient would benefit from skilled therapy intervention Yes  -EDUARDA (r) MD (t) SS (c)     PT Plan    PT Frequency 2x/week  -EDUARDA (r) MD (t) SS (c)     Predicted Duration of Therapy Intervention (days/wks) 3-4 weeks  -EDUARDA (r) MD (t) SS (c)     PT Plan Comments Continue POC and monitor pain and motion.  -EDUARDA (r) MD (t) SS (c)       User Key  (r) = Recorded By, (t) = Taken By, (c) = Cosigned By    Initials Name Provider Type     Alfonzo Maxwell, PT DPT Physical Therapist    MD Madie Angel, PT Student PT Student                Modalities       12/07/17 1300          Subjective Pain    Post-Treatment Pain Level 6  -SS      Moist Heat    MH Applied Yes  -EDUARDA (r) MD (t) SS (c)      Location trunk and R shoulder  -EDUARDA (r) MD (t) SS (c)      Rx Minutes 15 mins  -EDUARDA (r) MD (t) SS (c)      MH Prior to Rx No  -EDUARDA (r) MD (t) SS (c)      MH S/P Rx Yes  -EDUARDA (r) MD (t) SS (c)        User Key  (r) = Recorded By, (t) = Taken By, (c) = Cosigned By    Initials Name Provider Type     Alfonzo Maxwell, PT DPT Physical Therapist    MD Madie Angel, PT Student PT Student                Exercises       12/07/17 1300          Subjective Comments    Subjective Comments Pt reports shoulder feels a lot stiffer than it normally does and may be due to the colder weather. Subjective improvement 60%. Has noticed increased strength. Feels that current range of motion is decreased today.  -SS (r) MD (t) SS (c)      Subjective Pain    Able to rate subjective pain? yes  -EDUARAD (r) MD (t) SS (c)      Pre-Treatment Pain Level 6  -SS (r) MD (t) SS (c)      Post-Treatment Pain Level 6  -SS      Exercise 1    Exercise Name 1 Pro2, Seat 7, U/LE, F/R  -EDUARDA (r) MD (t) SS  (c)      Cueing 1 Verbal  -SS (r) MD (t) SS (c)      Time (Minutes) 1 10 mins  -SS (r) MD (t) SS (c)      Additional Comments Level 4  -SS (r) MD (t) SS (c)      Exercise 2    Exercise Name 2 Passive flexion and ER in TrueStretch  -SS (r) MD (t) SS (c)      Cueing 2 Verbal  -SS (r) MD (t) SS (c)      Sets 2 1  -SS (r) MD (t) SS (c)      Reps 2 25  -SS (r) MD (t) SS (c)      Exercise 3    Exercise Name 3 Pulley Flexion  -SS (r) MD (t) SS (c)      Cueing 3 Verbal  -SS (r) MD (t) SS (c)      Reps 3 30  -SS (r) MD (t) SS (c)      Exercise 4    Exercise Name 4 Pulley Abduction  -SS (r) MD (t) SS (c)      Cueing 4 Verbal  -SS (r) MD (t) SS (c)      Reps 4 20  -SS (r) MD (t) SS (c)      Exercise 5    Exercise Name 5 AAROM Shoulder Abduction  -SS (r) MD (t) SS (c)      Cueing 5 Verbal  -SS (r) MD (t) SS (c)      Sets 5 3  -SS (r) MD (t) SS (c)      Reps 5 10  -SS (r) MD (t) SS (c)      Exercise 6    Exercise Name 6 AAROM Shoulder Flexion  -SS (r) MD (t) SS (c)      Cueing 6 Verbal  -SS (r) MD (t) SS (c)      Sets 6 3  -SS (r) MD (t) SS (c)      Reps 6 10  -SS (r) MD (t) SS (c)      Exercise 7    Exercise Name 7 AAROM Shoulder ER  -SS (r) MD (t) SS (c)      Cueing 7 Verbal  -SS (r) MD (t) SS (c)      Sets 7 3  -SS (r) MD (t) SS (c)      Reps 7 10  -SS (r) MD (t) SS (c)      Exercise 8    Exercise Name 8 Isometric Shoulder ER  -SS (r) MD (t) SS (c)      Cueing 8 Verbal  -SS (r) MD (t) SS (c)      Sets 8 3  -SS (r) MD (t) SS (c)      Reps 8 10  -SS (r) MD (t) SS (c)      Time (Seconds) 8 5 second holds  -SS (r) MD (t) SS (c)      Exercise 9    Exercise Name 9 Isometric Shoulder Flexion  -SS (r) MD (t) SS (c)      Cueing 9 Verbal  -SS (r) MD (t) SS (c)      Sets 9 3  -SS (r) MD (t) SS (c)      Reps 9 10  -SS (r) MD (t) SS (c)      Time (Seconds) 9 5 second holds  -SS (r) MD (t) SS (c)        User Key  (r) = Recorded By, (t) = Taken By, (c) = Cosigned By    Initials Name Provider Type    EDUARDA Maxwell, PT DPT Physical  Therapist    MD Madie Angel, PT Student PT Student                               PT OP Goals       12/07/17 1300       PT Short Term Goals    STG Date to Achieve --   further deferred  -SS (r) MD (t) SS (c)     Long Term Goals    LTG Date to Achieve 12/19/17  -SS (r) MD (t) SS (c)     LTG 1 Independent with HEP  -SS (r) MD (t) SS (c)     LTG 1 Progress Not Met  -SS (r) MD (t) SS (c)     LTG 2 Standing R shoulder flexion AROM to be >/= 100 degrees  -SS (r) MD (t) SS (c)     LTG 2 Progress Met  -SS (r) MD (t) SS (c)     LTG 2 Progress Comments met previously  -SS     LTG 3 Supine R shoulder flexion AROM to be >/= 120 degrees  -SS (r) MD (t) SS (c)     LTG 3 Progress Met  -SS (r) MD (t) SS (c)     LTG 3 Progress Comments met previously  -SS     LTG 4 Standing R shoulder abduction AROM to be >/= 75 degrees  -SS (r) MD (t) SS (c)     LTG 4 Progress Met  -SS (r) MD (t) SS (c)     LTG 5 Report ability to reach to shoulder height to put away dishes  -SS (r) MD (t) SS (c)     LTG 5 Progress Not Met  -SS (r) MD (t) SS (c)     LTG 6 QuickDASH score to be </= 75  -SS (r) MD (t) SS (c)     LTG 6 Progress Not Met  -SS (r) MD (t) SS (c)     LTG 7 Standing shoulder flexion AROM to >/= 140 deg  -SS (r) MD (t) SS (c)     LTG 7 Progress Ongoing  -SS (r) MD (t) SS (c)     LTG 8 Standing shoulder abduction AROM to >/= 120 deg  -SS (r) MD (t) SS (c)     LTG 8 Progress Met  -SS (r) MD (t) SS (c)     LTG 8 Progress Comments met previously  -SS     Time Calculation    PT Goal Re-Cert Due Date 12/13/17  -SS (r) MD (t) SS (c)       User Key  (r) = Recorded By, (t) = Taken By, (c) = Cosigned By    Initials Name Provider Type    SS Alfonzo Maxwell, PT DPT Physical Therapist    MD Madie Angel, PT Student PT Student                    Time Calculation:   Start Time: 1346  Stop Time: 1452  Time Calculation (min): 66 min    Therapy Charges for Today     Code Description Service Date Service Provider Modifiers Qty    02817930026  PT THER  PROC EA 15 MIN 12/7/2017 Alfonzo Maxwell, PT DPT GP 3    70146986336 HC PT THER SUPP EA 15 MIN 12/7/2017 Alfonzo Maxwell, PT DPT GP 1                    Alfonzo Maxwell, PT, DPT, CHT  12/7/2017

## 2017-12-12 ENCOUNTER — HOSPITAL ENCOUNTER (OUTPATIENT)
Dept: PHYSICAL THERAPY | Facility: HOSPITAL | Age: 42
Setting detail: THERAPIES SERIES
Discharge: HOME | End: 2017-12-12

## 2017-12-12 DIAGNOSIS — M75.01 ADHESIVE CAPSULITIS OF RIGHT SHOULDER: Primary | ICD-10-CM

## 2017-12-12 DIAGNOSIS — M77.8 TENDINITIS OF RIGHT SHOULDER: ICD-10-CM

## 2017-12-12 DIAGNOSIS — M75.01 ADHESIVE BURSITIS OF RIGHT SHOULDER: ICD-10-CM

## 2017-12-12 PROCEDURE — 97110 THERAPEUTIC EXERCISES: CPT | Performed by: PHYSICAL THERAPIST

## 2017-12-12 NOTE — THERAPY TREATMENT NOTE
Outpatient Physical Therapy Ortho Treatment Note  [facility]     Patient Name: Christal Holder  : 1975  MRN: [id number]  Today's Date: 2017      Visit Date: 2017  Attendance:  (no visit limit)  Subjective Improvement: less  Next MD Appt: PRN  Recert Date: 17      Therapy Diagnosis: 1) R shoulder adhesive capsulitis; 2) R shoulder weakness; 3) pain      Visit Dx:    ICD-10-CM ICD-9-CM   1. Adhesive capsulitis of right shoulder M75.01 726.0   2. Tendinitis of right shoulder M75.81 726.10   3. Adhesive bursitis of right shoulder M75.01 726.0            Past Medical History:   Diagnosis Date   • Anxiety    • Atelectasis    • Chronic pain disorder    • Compression fracture of body of thoracic vertebra     T6-T12   • COPD (chronic obstructive pulmonary disease)     due to trauma   • Depression    • Diabetes mellitus    • Glenoid fracture of shoulder    • Pneumothorax     due to rib fractures   • Rib fracture    • Scapular fracture         Past Surgical History:   Procedure Laterality Date   • CHOLECYSTECTOMY     • GLENOID OPEN REDUCTION INTERNAL FIXATION Right    • SCAPULA OPEN REDUCTION INTERNAL FIXATION     • TUBAL ABDOMINAL LIGATION               PT Ortho       17 1400    Precautions and Contraindications    Precautions vertebral fxs with mal-union  -SS    Posture/Observations    Posture/Observations Comments Obvious shoulder and back pain. Kyphotic sitting.   -SS    General UE Assessment    ROM Detail Passive shoulder abduction and ER WNLs. Slightly limited passive shoulder flexion.  -      User Key  (r) = Recorded By, (t) = Taken By, (c) = Cosigned By    Initials Name Provider Type     Alfonzo Maxwell, PT DPT Physical Therapist                            PT Assessment/Plan       17 1400       PT Assessment    Functional Limitations Limitation in home management;Limitations in community activities;Performance in leisure activities;Performance in  self-care ADL;Performance in work activities  -     Impairments Dexterity;Endurance;Muscle strength;Pain;Posture;Range of motion  -     Assessment Comments Patient felt looser and less painful after PROM. I recommended that she return to supine AROM and progress back to standing as tolerated.  -     Rehab Potential Good   barriers: vertebral fxs, nerve damage  -     Patient/caregiver participated in establishment of treatment plan and goals Yes  -SS     Patient would benefit from skilled therapy intervention Yes  -SS     PT Plan    PT Frequency 2x/week  -SS     Predicted Duration of Therapy Intervention (days/wks) 3-4 weeks  -SS     PT Plan Comments Progress as tolerated. Recheck within the next week.  -       User Key  (r) = Recorded By, (t) = Taken By, (c) = Cosigned By    Initials Name Provider Type     Alfonzo Maxwell, PT DPT Physical Therapist                Modalities       12/12/17 1400          Subjective Comments    Subjective Comments Shoulder is continuing to be painful. Low back is bothering her. She thinks that the pain may be related to temperature change.  -SS      Subjective Pain    Able to rate subjective pain? yes  -SS      Pre-Treatment Pain Level 6  -SS      Moist Heat    Location trunk and R shoulder  -      Rx Minutes 15 mins  -      MH Prior to Rx Yes  -SS       S/P Rx No  -SS        User Key  (r) = Recorded By, (t) = Taken By, (c) = Cosigned By    Initials Name Provider Type    SS Alfonzo Maxwell, PT DPT Physical Therapist                Exercises       12/12/17 1400          Subjective Comments    Subjective Comments Shoulder is continuing to be painful. Low back is bothering her. She thinks that the pain may be related to temperature change.  -SS      Subjective Pain    Able to rate subjective pain? yes  -SS      Pre-Treatment Pain Level 6  -SS      Post-Treatment Pain Level 5  -SS      Exercise 1    Exercise Name 1 Supine shoulder flexion AROM  -      Cueing  1 Verbal  -SS      Sets 1 2  -SS      Reps 1 15  -SS      Exercise 2    Exercise Name 2 Supine shoulder circles at 90 deg flex - CW/CCW  -SS      Cueing 2 Verbal  -SS      Reps 2 20  -SS      Exercise 3    Exercise Name 3 PROM abduction  -SS      Reps 3 30  -SS      Exercise 4    Exercise Name 4 PROM flexion, ER  -SS      Reps 4 20  -SS      Exercise 5    Exercise Name 5 PNF D2 rhythmic initiation  -SS      Cueing 5 Tactile  -SS      Reps 5 20  -SS      Exercise 6    Exercise Name 6 PNF D1 rhythmic initiation  -SS      Cueing 6 Tactile  -SS      Reps 6 20  -SS      Exercise 7    Exercise Name 7 Isometric supine shoulder extension   -SS      Cueing 7 Verbal  -SS      Sets 7 1  -SS      Reps 7 15  -SS      Time (Seconds) 7 3 sec hold  -SS        User Key  (r) = Recorded By, (t) = Taken By, (c) = Cosigned By    Initials Name Provider Type    SS Alfonzo Maxwell, PT DPT Physical Therapist                               PT OP Goals       12/12/17 1400       PT Short Term Goals    STG Date to Achieve --   further deferred  -SS     Long Term Goals    LTG Date to Achieve 12/19/17  -SS     LTG 1 Independent with HEP  -SS     LTG 1 Progress Not Met  -SS     LTG 2 Standing R shoulder flexion AROM to be >/= 100 degrees  -SS     LTG 2 Progress Met  -SS     LTG 2 Progress Comments met previously  -SS     LTG 3 Supine R shoulder flexion AROM to be >/= 120 degrees  -SS     LTG 3 Progress Met  -SS     LTG 3 Progress Comments met previously  -SS     LTG 4 Standing R shoulder abduction AROM to be >/= 75 degrees  -SS     LTG 4 Progress Met  -SS     LTG 5 Report ability to reach to shoulder height to put away dishes  -SS     LTG 5 Progress Not Met  -SS     LTG 6 QuickDASH score to be </= 75  -SS     LTG 6 Progress Not Met  -SS     LTG 7 Standing shoulder flexion AROM to >/= 140 deg  -SS     LTG 7 Progress Ongoing  -SS     LTG 8 Standing shoulder abduction AROM to >/= 120 deg  -SS     LTG 8 Progress Met  -SS     Time Calculation     PT Goal Re-Cert Due Date 12/13/17  -       User Key  (r) = Recorded By, (t) = Taken By, (c) = Cosigned By    Initials Name Provider Type     Alfonzo Maxwell, PT DPT Physical Therapist          Therapy Education  Given: HEP  Program: Reinforced  How Provided: Verbal  Provided to: Patient  Level of Understanding: Verbalized              Time Calculation:   Start Time: 1436  Stop Time: 1514  Time Calculation (min): 38 min    Therapy Charges for Today     Code Description Service Date Service Provider Modifiers Qty    18635691133 HC PT THER PROC EA 15 MIN 12/12/2017 Alfonzo Maxwell, PT DPT GP 2    97799950948 HC PT THER SUPP EA 15 MIN 12/12/2017 Alfonzo Maxwell, PT DPT GP 1                    Alfonzo Maxwell, PT, DPT, CHT  12/12/2017

## 2017-12-14 ENCOUNTER — HOSPITAL ENCOUNTER (OUTPATIENT)
Dept: PHYSICAL THERAPY | Facility: HOSPITAL | Age: 42
Setting detail: THERAPIES SERIES
Discharge: HOME | End: 2017-12-14

## 2017-12-14 DIAGNOSIS — M77.8 TENDINITIS OF RIGHT SHOULDER: ICD-10-CM

## 2017-12-14 DIAGNOSIS — M75.01 ADHESIVE BURSITIS OF RIGHT SHOULDER: ICD-10-CM

## 2017-12-14 DIAGNOSIS — M75.01 ADHESIVE CAPSULITIS OF RIGHT SHOULDER: Primary | ICD-10-CM

## 2017-12-14 PROCEDURE — 97110 THERAPEUTIC EXERCISES: CPT | Performed by: PHYSICAL THERAPIST

## 2017-12-14 NOTE — THERAPY PROGRESS REPORT/RE-CERT
Outpatient Physical Therapy Ortho Progress Note  [facility]     Patient Name: Christal Holder  : 1975  MRN: [id number]  Today's Date: 2017      Visit Date: 2017  Attendance:  (no visit limit)  Subjective Improvement: 60%  Next MD Appt: PRN  Recert Date: 18      Therapy Diagnosis: 1) R shoulder adhesive capsulitis; 2) R shoulder weakness; 3) pain           Past Medical History:   Diagnosis Date   • Anxiety    • Atelectasis    • Chronic pain disorder    • Compression fracture of body of thoracic vertebra     T6-T12   • COPD (chronic obstructive pulmonary disease)     due to trauma   • Depression    • Diabetes mellitus    • Glenoid fracture of shoulder    • Pneumothorax     due to rib fractures   • Rib fracture    • Scapular fracture         Past Surgical History:   Procedure Laterality Date   • CHOLECYSTECTOMY     • GLENOID OPEN REDUCTION INTERNAL FIXATION Right    • SCAPULA OPEN REDUCTION INTERNAL FIXATION     • TUBAL ABDOMINAL LIGATION         Visit Dx:     ICD-10-CM ICD-9-CM   1. Adhesive capsulitis of right shoulder M75.01 726.0   2. Tendinitis of right shoulder M75.81 726.10   3. Adhesive bursitis of right shoulder M75.01 726.0     Changes in Medications: none noted  Changes in MD Orders: none noted  Number of Work Days Lost: n/a              PT Ortho       17 1400    Precautions and Contraindications    Precautions vertebral fxs with mal-union  -SS    Posture/Observations    Posture/Observations Comments Kyphotic sitting posture. Guards shoulder and back  -SS    Right Shoulder    Flexion AROM Deficit 110  -SS    Extension AROM Deficit 35  -SS    ABduction AROM Deficit 110  -SS    External Rotation AROM Deficit 20  -SS    General UE Assessment    ROM Detail PROM WFLs with audible pop/clunk when returning down from passive flexion.  -SS      17 1400    Precautions and Contraindications    Precautions vertebral fxs with mal-union  -SS    Posture/Observations     Posture/Observations Comments Obvious shoulder and back pain. Kyphotic sitting.   -    General UE Assessment    ROM Detail Passive shoulder abduction and ER WNLs. Slightly limited passive shoulder flexion.  -      User Key  (r) = Recorded By, (t) = Taken By, (c) = Cosigned By    Initials Name Provider Type     Alfonzo Maxwell, PT DPT Physical Therapist                      Therapy Education  Given: HEP  Program: Reinforced  How Provided: Verbal  Provided to: Patient  Level of Understanding: Verbalized           PT OP Goals       12/14/17 1400       PT Short Term Goals    STG Date to Achieve --   further deferred  -SS     Long Term Goals    LTG Date to Achieve 01/04/18  -     LTG 1 Independent with HEP  -     LTG 1 Progress Not Met  -SS     LTG 2 Standing R shoulder flexion AROM to be >/= 100 degrees  -     LTG 2 Progress Met  -SS     LTG 3 Supine R shoulder flexion AROM to be >/= 120 degrees  -     LTG 3 Progress Met  -SS     LTG 4 Standing R shoulder abduction AROM to be >/= 75 degrees  -     LTG 4 Progress Met  -     LTG 5 Report ability to reach to shoulder height to put away dishes  -     LTG 5 Progress Not Met  -SS     LTG 6 QuickDASH score to be </= 75  -     LTG 6 Progress Not Met  -SS     LTG 7 Standing shoulder flexion AROM to >/= 140 deg  -     LTG 7 Progress Ongoing  -     LTG 8 Standing shoulder abduction AROM to >/= 120 deg  -     LTG 8 Progress Not Met  -SS     Time Calculation    PT Goal Re-Cert Due Date 01/04/18  -       User Key  (r) = Recorded By, (t) = Taken By, (c) = Cosigned By    Initials Name Provider Type     Alfonzo Maxwell, PT DPT Physical Therapist                PT Assessment/Plan       12/14/17 1400       PT Assessment    Functional Limitations Limitation in home management;Limitations in community activities;Performance in leisure activities;Performance in self-care ADL;Performance in work activities  -     Impairments  Dexterity;Endurance;Muscle strength;Pain;Posture;Range of motion  -     Assessment Comments AROM has regressed over the past 9 days. Possibly due to weather changes but did occur at time when increased strengthening.  -     Rehab Potential Good   barriers: vertebral fxs, nerve damage, chronicity  -     Patient/caregiver participated in establishment of treatment plan and goals Yes  -SS     Patient would benefit from skilled therapy intervention Yes  -SS     PT Plan    PT Frequency 2x/week  -     Predicted Duration of Therapy Intervention (days/wks) 3-4 weeks  -     PT Plan Comments Continue to work on A/PROM, strengthening, MHP  -       User Key  (r) = Recorded By, (t) = Taken By, (c) = Cosigned By    Initials Name Provider Type     Alfonzo Maxwell, PT DPT Physical Therapist                Modalities       12/14/17 1400          Moist Heat    Location trunk and R shoulder  -      Rx Minutes Other:   20 mins  -SS      MH Prior to Rx No  -SS      MH S/P Rx Yes  -SS        User Key  (r) = Recorded By, (t) = Taken By, (c) = Cosigned By    Initials Name Provider Type     Alfonzo Maxwell, PT DPT Physical Therapist              Exercises       12/14/17 1400          Subjective Comments    Subjective Comments Shoulder is not hurting as bad as it was the past 9 days. Patient thinks that her pain increase is related to weather changes. No medication changes. 60% subjective improvement  -      Subjective Pain    Able to rate subjective pain? yes  -SS      Pre-Treatment Pain Level 5  -SS      Exercise 1    Exercise Name 1 Pro2, Seat 7, UE/LE, F/R  -      Cueing 1 Verbal  -      Time (Minutes) 1 10 mins  -      Additional Comments Level 2  -SS      Exercise 2    Exercise Name 2 Shoulder passive flexion in TrueStretch  -      Reps 2 Other   50  -SS      Exercise 3    Exercise Name 3 PROM flexion, abduction, ER  -SS      Reps 3 30  -SS      Exercise 4    Exercise Name 4 Isometric shoulder  flexion, abduction  -SS      Reps 4 10  -SS      Time (Seconds) 4 3 sec hold  -SS        User Key  (r) = Recorded By, (t) = Taken By, (c) = Cosigned By    Initials Name Provider Type    SS Alfonzo Maxwell, PT DPT Physical Therapist                                  Time Calculation:   Start Time: 1440  Stop Time: 1535  Time Calculation (min): 55 min     Therapy Charges for Today     Code Description Service Date Service Provider Modifiers Qty    07164569887  PT THER PROC EA 15 MIN 12/14/2017 Alfonzo Maxwell, PT DPT GP 2    [id number]  PT THER SUPP EA 15 MIN 12/14/2017 Alfonzo Maxwell PT DPT GP 2                    Alfonzo Maxwell, PT, DPT, CHT  12/14/2017

## 2017-12-19 ENCOUNTER — HOSPITAL ENCOUNTER (OUTPATIENT)
Dept: PHYSICAL THERAPY | Facility: HOSPITAL | Age: 42
Setting detail: THERAPIES SERIES
Discharge: HOME | End: 2017-12-19

## 2017-12-19 DIAGNOSIS — M75.01 ADHESIVE BURSITIS OF RIGHT SHOULDER: ICD-10-CM

## 2017-12-19 DIAGNOSIS — M77.8 TENDINITIS OF RIGHT SHOULDER: ICD-10-CM

## 2017-12-19 DIAGNOSIS — M75.01 ADHESIVE CAPSULITIS OF RIGHT SHOULDER: Primary | ICD-10-CM

## 2017-12-19 PROCEDURE — 97110 THERAPEUTIC EXERCISES: CPT | Performed by: PHYSICAL THERAPIST

## 2017-12-20 NOTE — THERAPY TREATMENT NOTE
Outpatient Physical Therapy Ortho Treatment Note  [facility]     Patient Name: Christal Holder  : 1975  MRN: [id number]  Today's Date: 2017      Visit Date: 2017  Attendance:  (no visit limit)  Subjective Improvement: 60%  Next MD Appt: PRN  Recert Date: 18      Therapy Diagnosis: 1) R shoulder adhesive capsulitis; 2) R shoulder weakness; 3) pain      Visit Dx:    ICD-10-CM ICD-9-CM   1. Adhesive capsulitis of right shoulder M75.01 726.0   2. Tendinitis of right shoulder M75.81 726.10   3. Adhesive bursitis of right shoulder M75.01 726.0            Past Medical History:   Diagnosis Date   • Anxiety    • Atelectasis    • Chronic pain disorder    • Compression fracture of body of thoracic vertebra     T6-T12   • COPD (chronic obstructive pulmonary disease)     due to trauma   • Depression    • Diabetes mellitus    • Glenoid fracture of shoulder    • Pneumothorax     due to rib fractures   • Rib fracture    • Scapular fracture         Past Surgical History:   Procedure Laterality Date   • CHOLECYSTECTOMY     • GLENOID OPEN REDUCTION INTERNAL FIXATION Right    • SCAPULA OPEN REDUCTION INTERNAL FIXATION     • TUBAL ABDOMINAL LIGATION               PT Ortho       17 1300    Precautions and Contraindications    Precautions vertebral fxs with mal-union  -      User Key  (r) = Recorded By, (t) = Taken By, (c) = Cosigned By    Initials Name Provider Type     Alfonzo Maxwell, PT DPT Physical Therapist                            PT Assessment/Plan       17 1300       PT Assessment    Functional Limitations Limitation in home management;Limitations in community activities;Performance in leisure activities;Performance in self-care ADL;Performance in work activities  -     Impairments Dexterity;Endurance;Muscle strength;Pain;Posture;Range of motion  -     Assessment Comments Pain limiting progress at present. Gives good effort with therex.  -     Rehab Potential  Good   barrier: vertebral fxs, nerve damage  -     Patient/caregiver participated in establishment of treatment plan and goals Yes  -SS     Patient would benefit from skilled therapy intervention Yes  -SS     PT Plan    PT Frequency 2x/week  -SS     Predicted Duration of Therapy Intervention (days/wks) 3-4 weeks  -SS     PT Plan Comments Progressive ROM, stretching, strengthening, MHP. Pulleys next  -SS       User Key  (r) = Recorded By, (t) = Taken By, (c) = Cosigned By    Initials Name Provider Type    EDUARDA Maxwell, PT DPT Physical Therapist                Modalities       12/19/17 1300          Moist Heat    Location trunk and R shoulder  -SS      Rx Minutes 15 mins  -SS      MH Prior to Rx No  -SS      MH S/P Rx Yes  -SS        User Key  (r) = Recorded By, (t) = Taken By, (c) = Cosigned By    Initials Name Provider Type    EDUARDA Maxwell, PT DPT Physical Therapist                Exercises       12/19/17 1300          Subjective Comments    Subjective Comments Continues to have pain in the shoulder. Shoulder doesn't feel like it's catching quite as much today. Slipped on tile floor last night and hit [place] doors with her back. 60% subjective improvement  -SS      Subjective Pain    Able to rate subjective pain? yes  -SS      Pre-Treatment Pain Level 5  -SS      Post-Treatment Pain Level 5  -SS      Exercise 1    Exercise Name 1 Pro2, Seat 6, U/LE, F/R  -SS      Cueing 1 Verbal  -SS      Time (Minutes) 1 10 mins  -SS      Additional Comments Level 2.5  -SS      Exercise 2    Exercise Name 2 Shoulder passive flexion in TrueStretch  -SS      Cueing 2 Verbal  -SS      Reps 2 30  -SS      Exercise 3    Exercise Name 3 Passive ER in TrueStretch  -SS      Cueing 3 Verbal  -SS      Reps 3 30  -SS      Exercise 4    Exercise Name 4 Isometric shoulder flexion, extension, abduction  -SS      Cueing 4 Verbal  -SS      Reps 4 15  -SS      Time (Seconds) 4 3 sec hold  -SS      Exercise 5    Exercise  Name 5 Active shoulder flexion with table inclined 35 degrees  -SS      Cueing 5 Verbal  -SS      Sets 5 3  -SS      Reps 5 10  -SS      Exercise 6    Exercise Name 6 PNF D2 rhythmic initiation  -SS      Cueing 6 Tactile  -SS      Sets 6 2  -SS      Reps 6 15  -SS        User Key  (r) = Recorded By, (t) = Taken By, (c) = Cosigned By    Initials Name Provider Type     Alfonzo Maxwell, PT DPT Physical Therapist                               PT OP Goals       12/19/17 1300       PT Short Term Goals    STG Date to Achieve --   further deferred  -SS     Long Term Goals    LTG Date to Achieve 01/04/18  -SS     LTG 1 Independent with HEP  -SS     LTG 1 Progress Not Met  -SS     LTG 2 Standing R shoulder flexion AROM to be >/= 100 degrees  -SS     LTG 2 Progress Met  -SS     LTG 3 Supine R shoulder flexion AROM to be >/= 120 degrees  -SS     LTG 3 Progress Met  -SS     LTG 4 Standing R shoulder abduction AROM to be >/= 75 degrees  -SS     LTG 4 Progress Met  -SS     LTG 5 Report ability to reach to shoulder height to put away dishes  -SS     LTG 5 Progress Not Met  -SS     LTG 6 QuickDASH score to be </= 75  -SS     LTG 6 Progress Not Met  -SS     LTG 7 Standing shoulder flexion AROM to >/= 140 deg  -SS     LTG 7 Progress Ongoing  -SS     LTG 8 Standing shoulder abduction AROM to >/= 120 deg  -SS     LTG 8 Progress Not Met  -SS     Time Calculation    PT Goal Re-Cert Due Date 01/04/17  -       User Key  (r) = Recorded By, (t) = Taken By, (c) = Cosigned By    Initials Name Provider Type     Alfonzo Maxwell, PT DPT Physical Therapist          Therapy Education  Given: HEP  Program: Reinforced  How Provided: Verbal  Provided to: Patient  Level of Understanding: Verbalized              Time Calculation:   Start Time: 1312  Stop Time: 1402  Time Calculation (min): 50 min    Therapy Charges for Today     Code Description Service Date Service Provider Modifiers Qty    53740621739  PT THER PROC EA 15 MIN  12/19/2017 Alfonzo Maxwell, PT DPT GP 2    75305752296 HC PT THER SUPP EA 15 MIN 12/19/2017 Alfonzo Maxwell, PT DPT GP 1                    Alfonzo Maxwell, PT, DPT, CHT  12/19/2017

## 2017-12-21 ENCOUNTER — HOSPITAL ENCOUNTER (OUTPATIENT)
Dept: PHYSICAL THERAPY | Facility: HOSPITAL | Age: 42
Setting detail: THERAPIES SERIES
Discharge: HOME | End: 2017-12-21

## 2017-12-21 DIAGNOSIS — M77.8 TENDINITIS OF RIGHT SHOULDER: ICD-10-CM

## 2017-12-21 DIAGNOSIS — M75.01 ADHESIVE CAPSULITIS OF RIGHT SHOULDER: Primary | ICD-10-CM

## 2017-12-21 DIAGNOSIS — M75.01 ADHESIVE BURSITIS OF RIGHT SHOULDER: ICD-10-CM

## 2017-12-21 PROCEDURE — 97110 THERAPEUTIC EXERCISES: CPT | Performed by: PHYSICAL THERAPIST

## 2017-12-21 PROCEDURE — 97140 MANUAL THERAPY 1/> REGIONS: CPT | Performed by: PHYSICAL THERAPIST

## 2017-12-22 NOTE — THERAPY TREATMENT NOTE
Outpatient Physical Therapy Ortho Treatment Note  [facility]     Patient Name: Christal Holder  : 1975  MRN: [id number]  Today's Date: 2017      Visit Date: 2017  Attendance:  (no visit limit)  Subjective Improvement: 60%  Next MD Appt: PRN  Recert Date: 18      Therapy Diagnosis: 1) R shoulder adhesive capsulitis; 2) R shoulder weakness; 3) pain      Visit Dx:    ICD-10-CM ICD-9-CM   1. Adhesive capsulitis of right shoulder M75.01 726.0   2. Tendinitis of right shoulder M75.81 726.10   3. Adhesive bursitis of right shoulder M75.01 726.0            Past Medical History:   Diagnosis Date   • Anxiety    • Atelectasis    • Chronic pain disorder    • Compression fracture of body of thoracic vertebra     T6-T12   • COPD (chronic obstructive pulmonary disease)     due to trauma   • Depression    • Diabetes mellitus    • Glenoid fracture of shoulder    • Pneumothorax     due to rib fractures   • Rib fracture    • Scapular fracture         Past Surgical History:   Procedure Laterality Date   • CHOLECYSTECTOMY     • GLENOID OPEN REDUCTION INTERNAL FIXATION Right    • SCAPULA OPEN REDUCTION INTERNAL FIXATION     • TUBAL ABDOMINAL LIGATION               PT Ortho       17 1300    Precautions and Contraindications    Precautions vertebral fxs with mal-union  -SS    Posture/Observations    Posture/Observations Comments Somewhat guards R shoulder.  -SS    Shoulder Girdle Palpation    Upper Trap Right:;Tender;Guarded/taut  -SS      17 1300    Precautions and Contraindications    Precautions vertebral fxs with mal-union  -SS      User Key  (r) = Recorded By, (t) = Taken By, (c) = Cosigned By    Initials Name Provider Type     Alfonzo Maxwell, PT DPT Physical Therapist                            PT Assessment/Plan       17 1300       PT Assessment    Functional Limitations Limitation in home management;Limitations in community activities;Performance in leisure  activities;Performance in self-care ADL;Performance in work activities  -     Impairments Dexterity;Endurance;Muscle strength;Pain;Posture;Range of motion  -     Assessment Comments Continues to have increased pain in the shoulder and neck region. Muscle tension in UT decreased with improved pain by R this date.  -     Rehab Potential Good   barrier: vertebral fxs, nerve damage  -     Patient/caregiver participated in establishment of treatment plan and goals Yes  -SS     Patient would benefit from skilled therapy intervention Yes  -SS     PT Plan    PT Frequency 2x/week  -     Predicted Duration of Therapy Intervention (days/wks) 3-4 weeks  -     PT Plan Comments Progressive ROM and strengthening  -       User Key  (r) = Recorded By, (t) = Taken By, (c) = Cosigned By    Initials Name Provider Type    EDUARDA Maxwell, PT DPT Physical Therapist                Modalities       12/21/17 1300          Moist Heat    Location trunk and R shoulder  -      Rx Minutes 15 mins  -SS      MH Prior to Rx No  -SS      MH S/P Rx Yes  -SS        User Key  (r) = Recorded By, (t) = Taken By, (c) = Cosigned By    Initials Name Provider Type     Alfonzo Maxwell, PT DPT Physical Therapist                Exercises       12/21/17 1300          Subjective Comments    Subjective Comments R UT has been flared up for the past week or so. Can feel a knot in the UT. More trouble sleeping because of the UT.  -      Subjective Pain    Able to rate subjective pain? yes  -SS      Pre-Treatment Pain Level 5  -SS      Post-Treatment Pain Level 4  -SS      Exercise 1    Exercise Name 1 Pro2, Seat 6, U/LE, F/R  -      Cueing 1 Verbal  -SS      Time (Minutes) 1 10 mins  -      Additional Comments Level 3  -SS      Exercise 2    Exercise Name 2 Pulley flexion & scaption  -      Cueing 2 Verbal  -SS      Reps 2 20  -SS      Exercise 3    Exercise Name 3 MFR  -SS      Exercise 4    Exercise Name 4 Supine active  shoulder flexion  -SS      Cueing 4 Verbal  -SS      Reps 4 20  -SS        User Key  (r) = Recorded By, (t) = Taken By, (c) = Cosigned By    Initials Name Provider Type    EDUARDA Maxwell, MICHAELA DPT Physical Therapist                        Manual Rx (last 36 hours)      Manual Treatments       12/21/17 1300          Manual Rx 1    Manual Rx 1 Location R UT  -SS      Manual Rx 1 Type MFR  -SS      Manual Rx 1 Duration 10 mins  -SS        User Key  (r) = Recorded By, (t) = Taken By, (c) = Cosigned By    Initials Name Provider Type     Alfonzo Maxwell, PT DPT Physical Therapist                PT OP Goals       12/21/17 1300       Time Calculation    PT Goal Re-Cert Due Date 01/04/18  -SS       User Key  (r) = Recorded By, (t) = Taken By, (c) = Cosigned By    Initials Name Provider Type     Alfonzo Maxwell, PT DPT Physical Therapist          Therapy Education  Given: HEP  Program: Reinforced  How Provided: Verbal  Provided to: Patient  Level of Understanding: Verbalized              Time Calculation:   Start Time: 1305  Stop Time: 1400  Time Calculation (min): 55 min    Therapy Charges for Today     Code Description Service Date Service Provider Modifiers Qty    36657065235 HC PT THER PROC EA 15 MIN 12/21/2017 Alfonzo Maxwell PT DPT GP 2    02293685441 HC PT MANUAL THERAPY EA 15 MIN 12/21/2017 Alfonzo Maxwell PT DPT GP 1    36743387266 HC PT THER SUPP EA 15 MIN 12/21/2017 Alfonzo Maxwell PT DPT GP 1                    Alfonzo Maxwell, PT, DPT, CHT  12/21/2017

## 2018-01-03 ENCOUNTER — HOSPITAL ENCOUNTER (OUTPATIENT)
Dept: PHYSICAL THERAPY | Facility: HOSPITAL | Age: 43
Setting detail: THERAPIES SERIES
Discharge: HOME | End: 2018-01-03

## 2018-01-03 DIAGNOSIS — M77.8 TENDINITIS OF RIGHT SHOULDER: ICD-10-CM

## 2018-01-03 DIAGNOSIS — M75.01 ADHESIVE CAPSULITIS OF RIGHT SHOULDER: Primary | ICD-10-CM

## 2018-01-03 DIAGNOSIS — M75.01 ADHESIVE BURSITIS OF RIGHT SHOULDER: ICD-10-CM

## 2018-01-03 PROCEDURE — 97110 THERAPEUTIC EXERCISES: CPT | Performed by: PHYSICAL THERAPIST

## 2018-01-03 PROCEDURE — G0283 ELEC STIM OTHER THAN WOUND: HCPCS | Performed by: PHYSICAL THERAPIST

## 2018-01-04 NOTE — THERAPY PROGRESS REPORT/RE-CERT
Outpatient Physical Therapy Ortho Progress Note  [facility]     Patient Name: Christal Holder  : 1975  MRN: [id number]  Today's Date: 1/3/2018      Visit Date: 2018  Attendance:  (no visit limit)  Subjective Improvement: 60%  Next MD Appt: PRN  Recert Date: 18      Therapy Diagnosis: 1) R shoulder adhesive capsulitis; 2) R shoulder weakness; 3) pain           Past Medical History:   Diagnosis Date   • Anxiety    • Atelectasis    • Chronic pain disorder    • Compression fracture of body of thoracic vertebra     T6-T12   • COPD (chronic obstructive pulmonary disease)     due to trauma   • Depression    • Diabetes mellitus    • Glenoid fracture of shoulder    • Pneumothorax     due to rib fractures   • Rib fracture    • Scapular fracture         Past Surgical History:   Procedure Laterality Date   • CHOLECYSTECTOMY     • GLENOID OPEN REDUCTION INTERNAL FIXATION Right    • SCAPULA OPEN REDUCTION INTERNAL FIXATION     • TUBAL ABDOMINAL LIGATION         Visit Dx:     ICD-10-CM ICD-9-CM   1. Adhesive capsulitis of right shoulder M75.01 726.0   2. Tendinitis of right shoulder M75.81 726.10   3. Adhesive bursitis of right shoulder M75.01 726.0     Changes in Medications: none noted  Changes in MD Orders: none noted  Number of Work Days Lost: n/a              PT Ortho       18 1100    Precautions and Contraindications    Precautions vertebral fxs with mal-union  -SS    Right Shoulder    Flexion AROM Deficit 118  -SS    Extension AROM Deficit 26  -SS    ABduction AROM Deficit 120  -SS    External Rotation AROM Deficit 22  -SS      User Key  (r) = Recorded By, (t) = Taken By, (c) = Cosigned By    Initials Name Provider Type    EDUARDA Maxwell, PT DPT Physical Therapist                      Therapy Education  Given: Pain management  How Provided: Verbal  Provided to: Patient  Level of Understanding: Verbalized           PT OP Goals       18 1100       PT Short Term Goals     STG Date to Achieve --   further deferred  -     Long Term Goals    LTG Date to Achieve 01/24/18  -     LTG 1 Independent with HEP  -     LTG 1 Progress Not Met  -SS     LTG 2 Standing R shoulder flexion AROM to be >/= 100 degrees  -     LTG 2 Progress Met  -SS     LTG 3 Supine R shoulder flexion AROM to be >/= 120 degrees  -     LTG 3 Progress Met  -SS     LTG 4 Standing R shoulder abduction AROM to be >/= 75 degrees  -     LTG 4 Progress Met  -SS     LTG 5 Report ability to reach to shoulder height to put away dishes  -     LTG 5 Progress Not Met  -SS     LTG 6 QuickDASH score to be </= 75  -     LTG 6 Progress Not Met  -SS     LTG 7 Standing shoulder flexion AROM to >/= 140 deg  -     LTG 7 Progress Ongoing  -SS     LTG 8 Standing shoulder abduction AROM to >/= 120 deg  -     LTG 8 Progress Met  -     Time Calculation    PT Goal Re-Cert Due Date 01/24/18  -       User Key  (r) = Recorded By, (t) = Taken By, (c) = Cosigned By    Initials Name Provider Type     Alfonzo Maxwell, PT DPT Physical Therapist                PT Assessment/Plan       01/03/18 1100       PT Assessment    Functional Limitations Limitation in home management;Limitations in community activities;Performance in leisure activities;Performance in self-care ADL;Performance in work activities  -     Impairments Dexterity;Endurance;Muscle strength;Pain;Posture;Range of motion  -     Assessment Comments Initiated IFC estim due to ongoing pain with cold weather. Patient has TENS units at home that I recommended she utilize. ROM is starting to return back to where it was 1 month ago  -     Rehab Potential Good   barrier: vertebral fx, nerve damage  -     Patient/caregiver participated in establishment of treatment plan and goals Yes  -     Patient would benefit from skilled therapy intervention Yes  -     PT Plan    PT Frequency 2x/week  -     Predicted Duration of Therapy Intervention (days/wks) 3-4  weeks  -SS     PT Plan Comments Progressive ROM and strengthening R shoulder, MHP with or without IFC estim for pain.  -SS       User Key  (r) = Recorded By, (t) = Taken By, (c) = Cosigned By    Initials Name Provider Type     Alfonzo Maxwell, PT DPT Physical Therapist                Modalities       01/03/18 1100          Subjective Pain    Subjective Pain Comment decreased time with MHP due to patient needing to be somewhere at 1200  -SS      Moist Heat    Location trunk and R shoulder concurrent with IFC estim  -SS      Rx Minutes 10 mins  -SS      MH Prior to Rx No  -SS      MH S/P Rx Yes  -SS      ELECTRICAL STIMULATION    Attended/Unattended Unattended  -SS      Stimulation Type IFC  -SS      Location/Electrode Placement/Other R shoulder concurrent with MHP  -SS      Rx Minutes 10 mins  -SS        User Key  (r) = Recorded By, (t) = Taken By, (c) = Cosigned By    Initials Name Provider Type     Alfonzo Maxwell, PT DPT Physical Therapist              Exercises       01/03/18 1100          Subjective Comments    Subjective Comments Pain has been worse due to the extreme cold. Can tell is stiffer. Motion continues to be decreased.  -SS      Subjective Pain    Able to rate subjective pain? yes  -SS      Pre-Treatment Pain Level 5  -SS      Post-Treatment Pain Level 4  -SS      Subjective Pain Comment decreased time with MHP due to patient needing to be somewhere at 1200  -SS      Exercise 1    Exercise Name 1 Pro2, Seat 6, F/R  -SS      Cueing 1 Verbal  -SS      Time (Minutes) 1 10 mins  -SS      Additional Comments Level 3  -SS      Exercise 2    Exercise Name 2 Pulley flexion, scaption  -SS      Cueing 2 Verbal  -SS      Reps 2 20  -SS      Exercise 3    Exercise Name 3 Isometric shoulder flexion, abduction, ER  -SS      Cueing 3 Verbal  -SS      Sets 3 1  -SS      Reps 3 15  -SS      Time (Seconds) 3 5 sec each  -SS        User Key  (r) = Recorded By, (t) = Taken By, (c) = Cosigned By    Initials  Name Provider Type    SS Alfonzo Maxwell, PT DPT Physical Therapist                                  Time Calculation:   Start Time: 1113  Stop Time: 1200  Time Calculation (min): 47 min     Therapy Charges for Today     Code Description Service Date Service Provider Modifiers Qty    14888607728 HC PT THER PROC EA 15 MIN 1/3/2018 Alfonzo Maxwell, PT DPT GP 2    83007950486 HC PT ELECTRICAL STIM UNATTENDED 1/3/2018 Alfonzo Maxwell, PT DPT  1    43188739670 HC PT THER SUPP EA 15 MIN 1/3/2018 Alfonzo Maxwell, PT DPT GP 1                    Alfonzo Maxwell, PT, DPT, CHT  1/3/2018

## 2018-01-05 ENCOUNTER — HOSPITAL ENCOUNTER (OUTPATIENT)
Dept: PHYSICAL THERAPY | Facility: HOSPITAL | Age: 43
Setting detail: THERAPIES SERIES
Discharge: HOME | End: 2018-01-05

## 2018-01-05 DIAGNOSIS — M75.01 ADHESIVE CAPSULITIS OF RIGHT SHOULDER: Primary | ICD-10-CM

## 2018-01-05 DIAGNOSIS — M77.8 TENDINITIS OF RIGHT SHOULDER: ICD-10-CM

## 2018-01-05 DIAGNOSIS — M75.01 ADHESIVE BURSITIS OF RIGHT SHOULDER: ICD-10-CM

## 2018-01-05 PROCEDURE — 97110 THERAPEUTIC EXERCISES: CPT | Performed by: PHYSICAL THERAPIST

## 2018-01-05 PROCEDURE — G0283 ELEC STIM OTHER THAN WOUND: HCPCS | Performed by: PHYSICAL THERAPIST

## 2018-01-05 NOTE — THERAPY TREATMENT NOTE
Outpatient Physical Therapy Ortho Treatment Note  [facility]     Patient Name: Christal Holder  : 1975  MRN: [id number]  Today's Date: 2018      Visit Date: 2018  Attendance:  (no visit limit)  Subjective Improvement: 60%  Next MD Appt: PRN  Recert Date: 18      Therapy Diagnosis: 1) R shoulder adhesive capsulitis; 2) R shoulder weakness; 3) pain      Visit Dx:    ICD-10-CM ICD-9-CM   1. Adhesive capsulitis of right shoulder M75.01 726.0   2. Tendinitis of right shoulder M75.81 726.10   3. Adhesive bursitis of right shoulder M75.01 726.0            Past Medical History:   Diagnosis Date   • Anxiety    • Atelectasis    • Chronic pain disorder    • Compression fracture of body of thoracic vertebra     T6-T12   • COPD (chronic obstructive pulmonary disease)     due to trauma   • Depression    • Diabetes mellitus    • Glenoid fracture of shoulder    • Pneumothorax     due to rib fractures   • Rib fracture    • Scapular fracture         Past Surgical History:   Procedure Laterality Date   • CHOLECYSTECTOMY     • GLENOID OPEN REDUCTION INTERNAL FIXATION Right    • SCAPULA OPEN REDUCTION INTERNAL FIXATION     • TUBAL ABDOMINAL LIGATION                               PT Assessment/Plan       18 1100       PT Assessment    Functional Limitations Limitation in home management;Limitations in community activities;Performance in leisure activities;Performance in self-care ADL;Performance in work activities  -     Impairments Dexterity;Endurance;Muscle strength;Pain;Posture;Range of motion  -     Assessment Comments Shoulder aggravated by T-band ER and extension. Patient gives good effort. IFC and MHP help decrease pain. Discussed TENS unit electrode placement for home management.  -     Rehab Potential Good   barriers: vertebral fxs, nerve damage  -     Patient/caregiver participated in establishment of treatment plan and goals Yes  -     Patient would benefit from skilled  therapy intervention Yes  -SS     PT Plan    PT Frequency 2x/week  -SS     Predicted Duration of Therapy Intervention (days/wks) 3-4 weeks  -SS     PT Plan Comments Continue POC. Attempt Royce Marissa next.  -SS       User Key  (r) = Recorded By, (t) = Taken By, (c) = Cosigned By    Initials Name Provider Type     Alfonzo Maxwell, PT DPT Physical Therapist                Modalities       01/05/18 1100          Moist Heat    Location trunk and R shoulder concurrent with IFC estim  -SS      Rx Minutes Other:   20 mins  -SS      MH Prior to Rx No  -SS      MH S/P Rx Yes  -SS      ELECTRICAL STIMULATION    Attended/Unattended Unattended  -SS      Stimulation Type IFC  -SS      Location/Electrode Placement/Other R shoulder concurrent with MHP  -SS      Rx Minutes 10 mins;20 mins  -SS        User Key  (r) = Recorded By, (t) = Taken By, (c) = Cosigned By    Initials Name Provider Type     Alfonzo Maxwell, PT DPT Physical Therapist                Exercises       01/05/18 1100          Subjective Comments    Subjective Comments Did okay with last therapy session. Late because was at doctor's office and then got behind slow vehicles. Stiff and sore. 60-70% subjective improvement.  -SS      Subjective Pain    Able to rate subjective pain? yes  -SS      Pre-Treatment Pain Level 5  -SS      Post-Treatment Pain Level 4  -SS      Exercise 1    Exercise Name 1 Pro2, Seat 6, F/R  -SS      Cueing 1 Verbal  -SS      Time (Minutes) 1 10 mins  -SS      Additional Comments Level 3  -SS      Exercise 2    Exercise Name 2 Pulley flexion, scaption  -SS      Cueing 2 Verbal  -SS      Reps 2 30  -SS      Exercise 3    Exercise Name 3 T-band row  -SS      Cueing 3 Verbal;Demo  -SS      Sets 3 2  -SS      Reps 3 10  -SS      Additional Comments Red  -SS      Exercise 4    Exercise Name 4 T-band shoulder IR  -SS      Cueing 4 Verbal;Demo  -SS      Sets 4 3  -SS      Reps 4 10  -SS      Additional Comments red  -SS       Exercise 5    Exercise Name 5 T-band shoulder ER  -SS      Cueing 5 --   verbal; demo  -SS      Sets 5 2  -SS      Reps 5 10  -SS      Additional Comments yellow  -SS      Exercise 6    Exercise Name 6 T-band shoulder ext  -SS      Cueing 6 --   verbal; demo  -SS      Sets 6 3  -SS      Reps 6 10  -SS      Additional Comments red  -SS        User Key  (r) = Recorded By, (t) = Taken By, (c) = Cosigned By    Initials Name Provider Type     Alfonzo Maxwell, PT DPT Physical Therapist                               PT OP Goals       01/05/18 1100       PT Short Term Goals    STG Date to Achieve --   further deferred  -SS     Long Term Goals    LTG Date to Achieve 01/24/18  -SS     LTG 1 Independent with HEP  -SS     LTG 1 Progress Not Met  -SS     LTG 2 Standing R shoulder flexion AROM to be >/= 100 degrees  -SS     LTG 2 Progress Met  -SS     LTG 3 Supine R shoulder flexion AROM to be >/= 120 degrees  -SS     LTG 3 Progress Met  -SS     LTG 4 Standing R shoulder abduction AROM to be >/= 75 degrees  -SS     LTG 4 Progress Met  -SS     LTG 5 Report ability to reach to shoulder height to put away dishes  -SS     LTG 5 Progress Not Met  -SS     LTG 6 QuickDASH score to be </= 75  -SS     LTG 6 Progress Not Met  -SS     LTG 7 Standing shoulder flexion AROM to >/= 140 deg  -SS     LTG 7 Progress Ongoing  -SS     LTG 8 Standing shoulder abduction AROM to >/= 120 deg  -SS     LTG 8 Progress Met  -SS     Time Calculation    PT Goal Re-Cert Due Date 01/24/18  -       User Key  (r) = Recorded By, (t) = Taken By, (c) = Cosigned By    Initials Name Provider Type     Alfonzo Maxwell, PT DPT Physical Therapist          Therapy Education  Given: Pain management  Program: Reinforced  How Provided: Verbal  Provided to: Patient  Level of Understanding: Verbalized              Time Calculation:   Start Time: 1116  Stop Time: 1208  Time Calculation (min): 52 min    Therapy Charges for Today     Code Description Service Date  Service Provider Modifiers Qty    24070567604 HC PT THER PROC EA 15 MIN 1/5/2018 Alfonzo Maxwell, PT DPT GP 2    25908104431 HC PT ELECTRICAL STIM UNATTENDED 1/5/2018 Alfonzo Maxwell, PT DPT  1    45874777903 HC PT THER SUPP EA 15 MIN 1/5/2018 Alfonzo Maxwell, PT DPT GP 1                    Alfonzo Maxwell, PT, DPT, CHT  1/5/2018

## 2018-01-09 ENCOUNTER — HOSPITAL ENCOUNTER (OUTPATIENT)
Dept: PHYSICAL THERAPY | Facility: HOSPITAL | Age: 43
Setting detail: THERAPIES SERIES
Discharge: HOME | End: 2018-01-09

## 2018-01-09 DIAGNOSIS — M75.01 ADHESIVE CAPSULITIS OF RIGHT SHOULDER: Primary | ICD-10-CM

## 2018-01-09 DIAGNOSIS — M77.8 TENDINITIS OF RIGHT SHOULDER: ICD-10-CM

## 2018-01-09 DIAGNOSIS — M75.01 ADHESIVE BURSITIS OF RIGHT SHOULDER: ICD-10-CM

## 2018-01-09 PROCEDURE — 97110 THERAPEUTIC EXERCISES: CPT | Performed by: PHYSICAL THERAPIST

## 2018-01-09 PROCEDURE — G0283 ELEC STIM OTHER THAN WOUND: HCPCS | Performed by: PHYSICAL THERAPIST

## 2018-01-09 NOTE — THERAPY TREATMENT NOTE
Outpatient Physical Therapy Ortho Treatment Note  [facility]     Patient Name: Christal Holder  : 1975  MRN: [id number]  Today's Date: 2018      Visit Date: 2018  Attendance:  (no visit limit)  Subjective Improvement: 60%  Next MD Appt: PRN  Recert Date: 18    Therapy Diagnosis: 1) R shoulder adhesive capsulitis; 2) R shoulder weakness; 3) pain    Visit Dx:    ICD-10-CM ICD-9-CM   1. Adhesive capsulitis of right shoulder M75.01 726.0   2. Tendinitis of right shoulder M75.81 726.10   3. Adhesive bursitis of right shoulder M75.01 726.0            Past Medical History:   Diagnosis Date   • Anxiety    • Atelectasis    • Chronic pain disorder    • Compression fracture of body of thoracic vertebra     T6-T12   • COPD (chronic obstructive pulmonary disease)     due to trauma   • Depression    • Diabetes mellitus    • Glenoid fracture of shoulder    • Pneumothorax     due to rib fractures   • Rib fracture    • Scapular fracture         Past Surgical History:   Procedure Laterality Date   • CHOLECYSTECTOMY     • GLENOID OPEN REDUCTION INTERNAL FIXATION Right    • SCAPULA OPEN REDUCTION INTERNAL FIXATION     • TUBAL ABDOMINAL LIGATION               PT Ortho       18 1300    Precautions and Contraindications    Precautions vertebral fxs with mal-union  -SS    Posture/Observations    Posture/Observations Comments Guarding R shoulder  -      User Key  (r) = Recorded By, (t) = Taken By, (c) = Cosigned By    Initials Name Provider Type    SS Alfonzo Maxwell, PT DPT Physical Therapist                            PT Assessment/Plan       18 1300       PT Assessment    Functional Limitations Limitation in home management;Limitations in community activities;Performance in leisure activities;Performance in self-care ADL;Performance in work activities  -     Impairments Dexterity;Endurance;Muscle strength;Pain;Posture;Range of motion  -     Assessment Comments Able to  complete therex. Decreased reps on a couple of exercises in response to flare up.   -SS     Rehab Potential Good   barrier: vertebral fxs, nerve damage  -SS     Patient/caregiver participated in establishment of treatment plan and goals Yes  -SS     Patient would benefit from skilled therapy intervention Yes  -SS     PT Plan    PT Frequency 2x/week  -     Predicted Duration of Therapy Intervention (days/wks) 3-4 weeks  -SS     PT Plan Comments Measure ROM next. Continue POC  -SS       User Key  (r) = Recorded By, (t) = Taken By, (c) = Cosigned By    Initials Name Provider Type     Alfonzo Maxwell, PT DPT Physical Therapist                Modalities       01/09/18 1300          Moist Heat    Location trunk and R shoulder concurrent with IFC estim  -SS      Rx Minutes 15 mins  -SS      MH Prior to Rx No  -SS      MH S/P Rx Yes  -SS      ELECTRICAL STIMULATION    Attended/Unattended Unattended  -      Stimulation Type IFC  -SS      Location/Electrode Placement/Other R shoulder concurrent with MHP  -SS      Rx Minutes 15 mins  -SS        User Key  (r) = Recorded By, (t) = Taken By, (c) = Cosigned By    Initials Name Provider Type     Alfonzo Maxwell, PT DPT Physical Therapist                Exercises       01/09/18 1300          Subjective Comments    Subjective Comments Patient has been more sore since a few hours after her last therapy session. Her pain is in the area of the vertebral fractures and pulling up the neck, down the right arm, and down the lateral R trunk. Did not sleep well last night.   -      Subjective Pain    Able to rate subjective pain? yes  -SS      Pre-Treatment Pain Level 6  -SS      Exercise 1    Exercise Name 1 Pro2, Seat 6, U/LE F/R  -      Cueing 1 Verbal  -SS      Time (Minutes) 1 10 mins  -SS      Additional Comments Level 3  -SS      Exercise 2    Exercise Name 2 Pulley flexion, scaption  -      Cueing 2 Verbal  -SS      Reps 2 30  -SS      Exercise 3    Exercise  Name 3 T-band row  -SS      Cueing 3 Verbal  -SS      Sets 3 2  -SS      Reps 3 10  -SS      Additional Comments red  -SS      Exercise 4    Exercise Name 4 T-band shoulder IR  -SS      Cueing 4 Verbal  -SS      Sets 4 3  -SS      Reps 4 10  -SS      Additional Comments red  -SS      Exercise 5    Exercise Name 5 T-band shoulder ext  -SS      Sets 5 2  -SS      Reps 5 10  -SS      Additional Comments red  -SS      Exercise 6    Exercise Name 6 T-band shoulder ER  -SS      Cueing 6 Verbal  -SS      Sets 6 2  -SS      Reps 6 10  -SS      Additional Comments yellow  -SS      Exercise 7    Exercise Name 7 Isometric ball squeeze for pec  -SS      Cueing 7 Verbal   demo  -SS      Sets 7 1  -SS      Reps 7 10  -SS      Time (Seconds) 7 5 sec hold  -SS        User Key  (r) = Recorded By, (t) = Taken By, (c) = Cosigned By    Initials Name Provider Type    SS Alfonzo Maxwell, PT DPT Physical Therapist                               PT OP Goals       01/09/18 1300       PT Short Term Goals    STG Date to Achieve --   further deferred  -SS     Long Term Goals    LTG Date to Achieve 01/24/18  -SS     LTG 1 Independent with HEP  -SS     LTG 1 Progress Not Met  -SS     LTG 2 Standing R shoulder flexion AROM to be >/= 100 degrees  -SS     LTG 2 Progress Met  -SS     LTG 3 Supine R shoulder flexion AROM to be >/= 120 degrees  -SS     LTG 3 Progress Met  -SS     LTG 4 Standing R shoulder abduction AROM to be >/= 75 degrees  -SS     LTG 4 Progress Met  -SS     LTG 5 Report ability to reach to shoulder height to put away dishes  -SS     LTG 5 Progress Not Met  -SS     LTG 6 QuickDASH score to be </= 75  -SS     LTG 6 Progress Not Met  -SS     LTG 7 Standing shoulder flexion AROM to >/= 140 deg  -SS     LTG 7 Progress Ongoing  -SS     LTG 8 Standing shoulder abduction AROM to >/= 120 deg  -SS     LTG 8 Progress Met  -SS     Time Calculation    PT Goal Re-Cert Due Date 01/24/18  -       User Key  (r) = Recorded By, (t) = Taken By,  (c) = Cosigned By    Initials Name Provider Type     Alfonzo Maxwell, PT DPT Physical Therapist          Therapy Education  Given: Pain management  Program: Reinforced  How Provided: Verbal  Provided to: Patient  Level of Understanding: Verbalized    Outcome Measure Options: Quick DASH  Quick DASH  Open a tight or new jar.: Unable  Do heavy household chores (e.g., wash walls, wash floors): Severe Difficulty  Carry a shopping bag or briefcase: Moderate Difficulty  Wash your back: Unable  Use a knife to cut food: Moderate Difficulty  Recreational activities in which you take some force or impact through your arm, should or hand (e.g. golf, hammering, tennis, etc.): Unable  During the past week, to what extent has your arm, shoulder, or hand problem interfered with your normal social activities with family, friends, neighbors or groups?: Quite a bit  During the past week, were you limited in your work or other regular daily activities as a result of your arm, shoulder or hand problem?: Very limited  Arm, Shoulder, or hand pain: Severe  Tingling (pins and needles) in your arm, shoulder, or hand: Moderate  During the past week, how much difficulty have you had sleeping because of the pain in your arm, shoulder or hand?: Severe Difficulty  Number of Questions Answered: 11  Quick DASH Score: 75         Time Calculation:   Start Time: 1301  Stop Time: 1354  Time Calculation (min): 53 min    Therapy Charges for Today     Code Description Service Date Service Provider Modifiers Qty    68500825910 HC PT THER PROC EA 15 MIN 1/9/2018 Alfonzo Maxwell PT DPT GP 2    47831891945 HC PT ELECTRICAL STIM UNATTENDED 1/9/2018 Alfonzo Maxwell PT DPT  1    19679449011 HC PT THER SUPP EA 15 MIN 1/9/2018 Alfonzo Maxwell PT DPT GP 1                   Alfonzo Maxwell PT, DPT, CHT  1/9/2018

## 2018-01-11 ENCOUNTER — HOSPITAL ENCOUNTER (OUTPATIENT)
Dept: PHYSICAL THERAPY | Facility: HOSPITAL | Age: 43
Setting detail: THERAPIES SERIES
Discharge: HOME | End: 2018-01-11

## 2018-01-11 DIAGNOSIS — M77.8 TENDINITIS OF RIGHT SHOULDER: ICD-10-CM

## 2018-01-11 DIAGNOSIS — M75.01 ADHESIVE CAPSULITIS OF RIGHT SHOULDER: Primary | ICD-10-CM

## 2018-01-11 DIAGNOSIS — M75.01 ADHESIVE BURSITIS OF RIGHT SHOULDER: ICD-10-CM

## 2018-01-11 PROCEDURE — G0283 ELEC STIM OTHER THAN WOUND: HCPCS | Performed by: PHYSICAL THERAPIST

## 2018-01-11 PROCEDURE — 97110 THERAPEUTIC EXERCISES: CPT | Performed by: PHYSICAL THERAPIST

## 2018-01-11 NOTE — THERAPY TREATMENT NOTE
Outpatient Physical Therapy Ortho Treatment Note  [facility]     Patient Name: Christal Holder  : 1975  MRN: [id number]  Today's Date: 2018      Visit Date: 2018  Attendance:  (no visit limit)  Subjective Improvement: 60%  Next MD Appt: PRN  Recert Date: 18     Therapy Diagnosis: 1) R shoulder adhesive capsulitis; 2) R shoulder weakness; 3) pain  Visit Dx:    ICD-10-CM ICD-9-CM   1. Adhesive capsulitis of right shoulder M75.01 726.0   2. Tendinitis of right shoulder M75.81 726.10   3. Adhesive bursitis of right shoulder M75.01 726.0            Past Medical History:   Diagnosis Date   • Anxiety    • Atelectasis    • Chronic pain disorder    • Compression fracture of body of thoracic vertebra     T6-T12   • COPD (chronic obstructive pulmonary disease)     due to trauma   • Depression    • Diabetes mellitus    • Glenoid fracture of shoulder    • Pneumothorax     due to rib fractures   • Rib fracture    • Scapular fracture         Past Surgical History:   Procedure Laterality Date   • CHOLECYSTECTOMY     • GLENOID OPEN REDUCTION INTERNAL FIXATION Right    • SCAPULA OPEN REDUCTION INTERNAL FIXATION     • TUBAL ABDOMINAL LIGATION               PT Ortho       18 1300    Precautions and Contraindications    Precautions vertebral fxs with mal-union  -SS    Shoulder Girdle Palpation    Upper Trap Right:;Tender;Guarded/taut  -SS    Right Shoulder    Flexion AROM Deficit 120  -SS    Extension AROM Deficit 36  -SS    ABduction AROM Deficit 126  -SS    External Rotation AROM Deficit 20  -SS      18 1300    Precautions and Contraindications    Precautions vertebral fxs with mal-union  -SS    Posture/Observations    Posture/Observations Comments Guarding R shoulder  -SS      User Key  (r) = Recorded By, (t) = Taken By, (c) = Cosigned By    Initials Name Provider Type    SS Alfonzo Maxwell, PT DPT Physical Therapist                            PT Assessment/Plan        "01/11/18 1300       PT Assessment    Functional Limitations Limitation in home management;Limitations in community activities;Performance in leisure activities;Performance in self-care ADL;Performance in work activities  -     Impairments Dexterity;Endurance;Muscle strength;Pain;Posture;Range of motion  -     Assessment Comments Decreased pain this date. Partly due to improved temperature, partly due to decreased spasming in the R UT. Tolerated treatment well.  -     Rehab Potential Good   barriers: vertebral fxs, nerve damage  -     Patient/caregiver participated in establishment of treatment plan and goals Yes  -SS     Patient would benefit from skilled therapy intervention Yes  -SS     PT Plan    PT Frequency 2x/week  -     Predicted Duration of Therapy Intervention (days/wks) 3-4 weeks  -     PT Plan Comments Continue POC. Gentle progressive strengthening.  -       User Key  (r) = Recorded By, (t) = Taken By, (c) = Cosigned By    Initials Name Provider Type     Alfonzo Maxwell, PT DPT Physical Therapist                Modalities       01/11/18 1300          Moist Heat    Location trunk and R shoulder concurrent with IFC estim  -SS      Rx Minutes Other:   20 mins  -      MH Prior to Rx No  -      MH S/P Rx Yes  -      ELECTRICAL STIMULATION    Attended/Unattended Unattended  -      Stimulation Type IFC  -SS      Location/Electrode Placement/Other R shoulder concurrent with MHP  -SS      Rx Minutes 20 mins  -        User Key  (r) = Recorded By, (t) = Taken By, (c) = Cosigned By    Initials Name Provider Type     Alfonzo Maxwell, PT DPT Physical Therapist                Exercises       01/11/18 1300          Subjective Comments    Subjective Comments Pain \"not too bad.\" Estim with electrode over the UT helped.   -      Subjective Pain    Able to rate subjective pain? yes  -SS      Pre-Treatment Pain Level 4  -      Post-Treatment Pain Level 3  -      Exercise 1    " Exercise Name 1 Pro2, Seat 6, U/LE F/R  -SS      Cueing 1 Verbal  -SS      Time (Minutes) 1 10 mins  -SS      Additional Comments Level 3.5  -SS      Exercise 2    Exercise Name 2 Pulley Flexion, Abduction  -SS      Cueing 2 Verbal  -SS      Reps 2 30  -SS      Exercise 3    Exercise Name 3 Table walkaways  -SS      Cueing 3 Verbal  -SS      Sets 3 2  -SS      Reps 3 10  -SS      Exercise 4    Exercise Name 4 DB shoulder flexion to </= shoulder height  -SS      Cueing 4 Verbal  -SS      Sets 4 1  -SS      Reps 4 10  -SS      Additional Comments 1#  -SS      Exercise 5    Exercise Name 5 DB shoulder abduction to </= shoulder height  -SS      Cueing 5 Verbal  -SS      Sets 5 1  -SS      Reps 5 10  -SS      Additional Comments 1#  -SS      Exercise 6    Exercise Name 6 T-band row  -SS      Cueing 6 Verbal  -SS      Sets 6 3  -SS      Reps 6 10  -SS      Additional Comments red  -SS      Exercise 7    Exercise Name 7 Isometric ball squeeze for pec  -SS      Cueing 7 Verbal   demo  -SS      Sets 7 1  -SS      Reps 7 15  -SS      Time (Seconds) 7 5 sec hold  -SS        User Key  (r) = Recorded By, (t) = Taken By, (c) = Cosigned By    Initials Name Provider Type     Alfonzo Maxwell, PT DPT Physical Therapist                        Manual Rx (last 36 hours)      Manual Treatments       01/11/18 1300          Manual Rx 1    Manual Rx 1 Location R UT   -SS      Manual Rx 1 Type MFR  -SS      Manual Rx 1 Duration 5 mins  -SS        User Key  (r) = Recorded By, (t) = Taken By, (c) = Cosigned By    Initials Name Provider Type     Alfonzo Maxwell, PT DPT Physical Therapist                PT OP Goals       01/11/18 1300       PT Short Term Goals    STG Date to Achieve --   further deferred  -SS     Long Term Goals    LTG Date to Achieve 01/24/18  -SS     LTG 1 Independent with HEP  -SS     LTG 1 Progress Not Met  -SS     LTG 2 Standing R shoulder flexion AROM to be >/= 100 degrees  -SS     LTG 2 Progress Met  -SS      LTG 3 Supine R shoulder flexion AROM to be >/= 120 degrees  -SS     LTG 3 Progress Met  -SS     LTG 4 Standing R shoulder abduction AROM to be >/= 75 degrees  -SS     LTG 4 Progress Met  -SS     LTG 5 Report ability to reach to shoulder height to put away dishes  -SS     LTG 5 Progress Not Met  -SS     LTG 6 QuickDASH score to be </= 75  -SS     LTG 6 Progress Not Met  -SS     LTG 7 Standing shoulder flexion AROM to >/= 140 deg  -SS     LTG 7 Progress Ongoing  -SS     LTG 8 Standing shoulder abduction AROM to >/= 120 deg  -SS     LTG 8 Progress Met  -SS     Time Calculation    PT Goal Re-Cert Due Date 01/24/18  -       User Key  (r) = Recorded By, (t) = Taken By, (c) = Cosigned By    Initials Name Provider Type    SS Alfonzo Maxwell, PT DPT Physical Therapist          Therapy Education  Given: HEP, Pain management  Program: Reinforced  How Provided: Verbal  Provided to: Patient  Level of Understanding: Verbalized              Time Calculation:   Start Time: 1303  Stop Time: 1409  Time Calculation (min): 66 min    Therapy Charges for Today     Code Description Service Date Service Provider Modifiers Qty    42737542521 HC PT THER PROC EA 15 MIN 1/11/2018 Alfonzo Maxwell, PT DPT GP 3    74535825733 HC PT ELECTRICAL STIM UNATTENDED 1/11/2018 Alfonzo Maxwell PT DPT  1    19135255490 HC PT THER SUPP EA 15 MIN 1/11/2018 Alfonzo Maxwell, PT DPT GP 1                    Alfonzo Maxwell, PT, DPT, CHT  1/11/2018

## 2018-01-16 ENCOUNTER — APPOINTMENT (OUTPATIENT)
Dept: PHYSICAL THERAPY | Facility: HOSPITAL | Age: 43
End: 2018-01-16

## 2018-01-18 ENCOUNTER — APPOINTMENT (OUTPATIENT)
Dept: PHYSICAL THERAPY | Facility: HOSPITAL | Age: 43
End: 2018-01-18

## 2018-01-22 ENCOUNTER — HOSPITAL ENCOUNTER (OUTPATIENT)
Dept: PHYSICAL THERAPY | Facility: HOSPITAL | Age: 43
Setting detail: THERAPIES SERIES
Discharge: HOME | End: 2018-01-22

## 2018-01-22 DIAGNOSIS — M75.01 ADHESIVE CAPSULITIS OF RIGHT SHOULDER: Primary | ICD-10-CM

## 2018-01-22 DIAGNOSIS — M77.8 TENDINITIS OF RIGHT SHOULDER: ICD-10-CM

## 2018-01-22 PROCEDURE — G0283 ELEC STIM OTHER THAN WOUND: HCPCS | Performed by: PHYSICAL THERAPIST

## 2018-01-22 PROCEDURE — 97110 THERAPEUTIC EXERCISES: CPT | Performed by: PHYSICAL THERAPIST

## 2018-01-22 PROCEDURE — 97140 MANUAL THERAPY 1/> REGIONS: CPT | Performed by: PHYSICAL THERAPIST
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
## 2018-01-24 ENCOUNTER — HOSPITAL ENCOUNTER (OUTPATIENT)
Dept: PHYSICAL THERAPY | Facility: HOSPITAL | Age: 43
Setting detail: THERAPIES SERIES
Discharge: HOME | End: 2018-01-24

## 2018-01-24 DIAGNOSIS — M75.01 ADHESIVE CAPSULITIS OF RIGHT SHOULDER: Primary | ICD-10-CM

## 2018-01-24 DIAGNOSIS — M77.8 TENDINITIS OF RIGHT SHOULDER: ICD-10-CM

## 2018-01-24 PROCEDURE — G0283 ELEC STIM OTHER THAN WOUND: HCPCS | Performed by: PHYSICAL THERAPIST

## 2018-01-24 PROCEDURE — 97110 THERAPEUTIC EXERCISES: CPT | Performed by: PHYSICAL THERAPIST

## 2018-01-24 NOTE — THERAPY TREATMENT NOTE
Outpatient Physical Therapy Ortho Treatment Note  [facility]     Patient Name: Christal Holder  : 1975  MRN: [id number]  Today's Date: 2018      Visit Date: 2018  Attendance:  (no visit limit)  Subjective Improvement: 65%  Next MD Appt: PRN  Recert Date: 18      Therapy Diagnosis: 1) R shoulder adhesive capsulitis; 2) R shoulder weakness; 3) pain    Visit Dx:    ICD-10-CM ICD-9-CM   1. Adhesive capsulitis of right shoulder M75.01 726.0   2. Tendinitis of right shoulder M75.81 726.10            Past Medical History:   Diagnosis Date   • Anxiety    • Atelectasis    • Chronic pain disorder    • Compression fracture of body of thoracic vertebra     T6-T12   • COPD (chronic obstructive pulmonary disease)     due to trauma   • Depression    • Diabetes mellitus    • Glenoid fracture of shoulder    • Pneumothorax     due to rib fractures   • Rib fracture    • Scapular fracture         Past Surgical History:   Procedure Laterality Date   • CHOLECYSTECTOMY     • GLENOID OPEN REDUCTION INTERNAL FIXATION Right    • SCAPULA OPEN REDUCTION INTERNAL FIXATION     • TUBAL ABDOMINAL LIGATION               PT Ortho       18 1400    Right Shoulder    Flexion AROM Deficit 105  -SS    ABduction AROM Deficit 100  -SS      18 1300    Precautions and Contraindications    Precautions vertebral fxs with mal-union  -SS    Subjective Pain    Post-Treatment Pain Level 4  -SS    Right Shoulder    Flexion AROM Deficit 104  -SS    ABduction AROM Deficit 90  -SS      User Key  (r) = Recorded By, (t) = Taken By, (c) = Cosigned By    Initials Name Provider Type     Alfonzo Maxwell, PT DPT Physical Therapist                            PT Assessment/Plan       18 1400       PT Assessment    Functional Limitations Limitation in home management;Limitations in community activities;Performance in leisure activities;Performance in self-care ADL;Performance in work activities  -      Impairments Dexterity;Endurance;Muscle strength;Pain;Posture;Range of motion  -     Assessment Comments Continuing pain. Unsure why pain is elevated and motion decreased.  -     Rehab Potential Good   barriers: vertebral fxs, nerve damage  -     Patient/caregiver participated in establishment of treatment plan and goals Yes  -SS     Patient would benefit from skilled therapy intervention Yes  -SS     PT Plan    PT Frequency 2x/week  -SS     Predicted Duration of Therapy Intervention (days/wks) 3-4 weeks  -SS     PT Plan Comments Recheck next with possible D/C at end of week.  -       User Key  (r) = Recorded By, (t) = Taken By, (c) = Cosigned By    Initials Name Provider Type     Alfonzo Maxwell, PT DPT Physical Therapist                Modalities       01/24/18 1400          Moist Heat    Location trunk and R shoulder concurrent with IFC estim  -SS      Rx Minutes 15 mins  -SS      MH Prior to Rx No  -SS       S/P Rx Yes  -SS      ELECTRICAL STIMULATION    Attended/Unattended Unattended  -      Stimulation Type IFC  -SS      Location/Electrode Placement/Other R shoulder concurrent with MHP  -SS      Rx Minutes 15 mins  -SS        User Key  (r) = Recorded By, (t) = Taken By, (c) = Cosigned By    Initials Name Provider Type     Alfonzo Maxwell, PT DPT Physical Therapist                Exercises       01/24/18 1400          Subjective Comments    Subjective Comments Right shoulder is catching today. Neck was very sore Monday night. 65% subjective improvement  -      Subjective Pain    Able to rate subjective pain? yes  -SS      Pre-Treatment Pain Level 6  -SS      Post-Treatment Pain Level 6  -SS      Exercise 1    Exercise Name 1 Pro2, Seat 7, UE/LE, F/R  -      Cueing 1 Verbal  -      Time (Minutes) 1 10 mins  -SS      Additional Comments Level 2.5  -SS      Exercise 2    Exercise Name 2 Pulley Flexion, Abduction  -      Cueing 2 Verbal  -SS      Reps 2 30  -SS      Exercise 3     Exercise Name 3 T-band scapular row  -SS      Cueing 3 Verbal  -SS      Sets 3 2  -SS      Reps 3 10  -SS      Additional Comments green  -SS      Exercise 4    Exercise Name 4 Seated pushdown into ball  -SS      Cueing 4 Verbal  -SS      Sets 4 2  -SS      Reps 4 10  -SS      Time (Seconds) 4 2-3 sec hold  -SS      Exercise 5    Exercise Name 5 Isometric ball squeeze  -SS      Cueing 5 Verbal  -SS      Sets 5 1  -SS      Reps 5 15  -SS      Additional Comments 5 sec hold  -SS        User Key  (r) = Recorded By, (t) = Taken By, (c) = Cosigned By    Initials Name Provider Type     Alfonzo Maxwell, PT DPT Physical Therapist                               PT OP Goals       01/24/18 1400       PT Short Term Goals    STG Date to Achieve --   further deferred  -SS     Long Term Goals    LTG Date to Achieve 01/24/18  -SS     LTG 1 Independent with HEP  -SS     LTG 1 Progress Not Met  -SS     LTG 2 Standing R shoulder flexion AROM to be >/= 100 degrees  -SS     LTG 2 Progress Met  -SS     LTG 3 Supine R shoulder flexion AROM to be >/= 120 degrees  -SS     LTG 3 Progress Met  -SS     LTG 4 Standing R shoulder abduction AROM to be >/= 75 degrees  -SS     LTG 4 Progress Met  -SS     LTG 5 Report ability to reach to shoulder height to put away dishes  -SS     LTG 5 Progress Not Met  -SS     LTG 6 QuickDASH score to be </= 75  -SS     LTG 6 Progress Not Met  -SS     LTG 7 Standing shoulder flexion AROM to >/= 140 deg  -SS     LTG 7 Progress Ongoing  -SS     LTG 8 Standing shoulder abduction AROM to >/= 120 deg  -SS     LTG 8 Progress Met  -SS     Time Calculation    PT Goal Re-Cert Due Date 01/24/18  -       User Key  (r) = Recorded By, (t) = Taken By, (c) = Cosigned By    Initials Name Provider Type     Alfonzo Maxwell, PT DPT Physical Therapist          Therapy Education  Given: Pain management  Program: Reinforced  How Provided: Verbal  Provided to: Patient  Level of Understanding: Verbalized               Time Calculation:   Start Time: 1355  Stop Time: 1440  Time Calculation (min): 45 min    Therapy Charges for Today     Code Description Service Date Service Provider Modifiers Qty    19077580992 HC PT THER PROC EA 15 MIN 1/24/2018 Alfonzo Maxwell, PT DPT GP 2    10748937600 HC PT ELECTRICAL STIM UNATTENDED 1/24/2018 Alfonzo Maxwell, PT DPT  1    98882834091 HC PT THER SUPP EA 15 MIN 1/24/2018 Alfonzo Maxwell, PT DPT GP 1                    Alfonzo Maxwell, PT, DPT, CHT  1/24/2018

## 2018-01-30 ENCOUNTER — HOSPITAL ENCOUNTER (OUTPATIENT)
Dept: PHYSICAL THERAPY | Facility: HOSPITAL | Age: 43
Setting detail: THERAPIES SERIES
Discharge: HOME | End: 2018-01-30

## 2018-01-30 DIAGNOSIS — M75.01 ADHESIVE CAPSULITIS OF RIGHT SHOULDER: Primary | ICD-10-CM

## 2018-01-30 DIAGNOSIS — M77.8 TENDINITIS OF RIGHT SHOULDER: ICD-10-CM

## 2018-01-30 PROCEDURE — 97110 THERAPEUTIC EXERCISES: CPT | Performed by: PHYSICAL THERAPIST

## 2018-01-30 NOTE — THERAPY TREATMENT NOTE
Outpatient Physical Therapy Ortho Treatment Note  [facility]     Patient Name: Christal Holder  : 1975  MRN: [id number]  Today's Date: 2018      Visit Date: 2018  Attendance:  (no visit limit)  Subjective Improvement: 60-65%  Next MD Appt: PRN  Recert Date: 18      Therapy Diagnosis: 1) R shoulder adhesive capsulitis; 2) R shoulder weakness; 3) pain     Visit Dx:    ICD-10-CM ICD-9-CM   1. Adhesive capsulitis of right shoulder M75.01 726.0   2. Tendinitis of right shoulder M75.81 726.10            Past Medical History:   Diagnosis Date   • Anxiety    • Atelectasis    • Chronic pain disorder    • Compression fracture of body of thoracic vertebra     T6-T12   • COPD (chronic obstructive pulmonary disease)     due to trauma   • Depression    • Diabetes mellitus    • Glenoid fracture of shoulder    • Pneumothorax     due to rib fractures   • Rib fracture    • Scapular fracture         Past Surgical History:   Procedure Laterality Date   • CHOLECYSTECTOMY     • GLENOID OPEN REDUCTION INTERNAL FIXATION Right    • SCAPULA OPEN REDUCTION INTERNAL FIXATION     • TUBAL ABDOMINAL LIGATION               PT Ortho       18 1300    Precautions and Contraindications    Precautions vertebral fxs with mal-union  -SS    Posture/Observations    Posture/Observations Comments Guarding R shoulder. Abrasions B knees. Dorsal portion of sock on left foot is torn. Palpable clunk in shoulder noted during flexion and scaption. Able to decrease the clunk by holding manual pressure to anterior humerus. Not able to modify the clunk during scaption.  -SS      User Key  (r) = Recorded By, (t) = Taken By, (c) = Cosigned By    Initials Name Provider Type    SS Alfonzo Maxwell, PT DPT Physical Therapist                            PT Assessment/Plan       18 1300       PT Assessment    Functional Limitations Limitation in home management;Limitations in community activities;Performance in  leisure activities;Performance in self-care ADL;Performance in work activities  -SS     Impairments Dexterity;Endurance;Muscle strength;Pain;Posture;Range of motion  -     Assessment Comments Limited ability to participate in therapy this date without increasing pain due to fall prior to her therapy appointment. I elected to stop treatment this date. She is to contact her doctor's office about the fall.  -     Rehab Potential Good   barriers: vertebral fxs, nerve damage  -SS     PT Plan    PT Frequency 2x/week  -SS     Predicted Duration of Therapy Intervention (days/wks) TBA next session  -SS     PT Plan Comments Recheck next. Possible D/C  -SS       User Key  (r) = Recorded By, (t) = Taken By, (c) = Cosigned By    Initials Name Provider Type    SS Alfonzo Maxwell, PT DPT Physical Therapist                Modalities       01/30/18 1300          Subjective Comments    Subjective Comments Patient reports that she tripped over a curb/parking stop at her doctor's office to  a prescription for her daughter. She landed on her knees and R hand then her back. Her back and shoulder are hurting worse. She feels like she twisted her back and irritated the shoulder. She noticed that the right shoulder hurt more when driving to [place] for her therapy appt.   -      Subjective Pain    Able to rate subjective pain? yes  -SS      Pre-Treatment Pain Level 7  -      Moist Heat    Location R shoulder  -      Rx Minutes 15 mins  -SS      MH Prior to Rx Yes  -SS        User Key  (r) = Recorded By, (t) = Taken By, (c) = Cosigned By    Initials Name Provider Type    SS Alfonzo Maxwell, PT DPT Physical Therapist                Exercises       01/30/18 1300          Subjective Comments    Subjective Comments Patient reports that she tripped over a curb/parking stop at her doctor's office to  a prescription for her daughter. She landed on her knees and R hand then her back. Her back and shoulder are  hurting worse. She feels like she twisted her back and irritated the shoulder. She noticed that the right shoulder hurt more when driving to [place] for her therapy appt.   -SS      Subjective Pain    Able to rate subjective pain? yes  -SS      Pre-Treatment Pain Level 7  -SS      Post-Treatment Pain Level 7  -SS      Exercise 1    Exercise Name 1 Pulley flexion,   -SS      Cueing 1 Verbal  -SS      Time (Minutes) 1 30  -SS      Exercise 2    Exercise Name 2 Pulley scaption  -SS      Cueing 2 Verbal  -SS      Reps 2 12  -SS      Exercise 3    Exercise Name 3 Isometric shoulder extension, abduction; treatment table inclined approx 60 deg  -SS      Cueing 3 Verbal  -SS      Sets 3 1  -SS      Reps 3 15  -SS      Time (Seconds) 3 3 sec hold  -SS        User Key  (r) = Recorded By, (t) = Taken By, (c) = Cosigned By    Initials Name Provider Type    SS Alfonzo Maxwell, PT DPT Physical Therapist                               PT OP Goals       01/30/18 1300       PT Short Term Goals    STG Date to Achieve --   further deferred  -SS     Long Term Goals    LTG Date to Achieve 01/24/18  -     LTG 1 Independent with HEP  -SS     LTG 1 Progress Not Met  -SS     LTG 2 Standing R shoulder flexion AROM to be >/= 100 degrees  -     LTG 2 Progress Met  -SS     LTG 3 Supine R shoulder flexion AROM to be >/= 120 degrees  -     LTG 3 Progress Met  -SS     LTG 4 Standing R shoulder abduction AROM to be >/= 75 degrees  -     LTG 4 Progress Met  -SS     LTG 5 Report ability to reach to shoulder height to put away dishes  -SS     LTG 5 Progress Not Met  -SS     LTG 6 QuickDASH score to be </= 75  -SS     LTG 6 Progress Not Met  -SS     LTG 7 Standing shoulder flexion AROM to >/= 140 deg  -SS     LTG 7 Progress Ongoing  -SS     LTG 8 Standing shoulder abduction AROM to >/= 120 deg  -SS     LTG 8 Progress Met  -SS     Time Calculation    PT Goal Re-Cert Due Date 01/24/18  -       User Key  (r) = Recorded By, (t) = Taken  By, (c) = Cosigned By    Initials Name Provider Type    SS Alfonzo Maxwell, PT DPT Physical Therapist          Therapy Education  Given: Pain management (rest, heat, ice)  Program: New  How Provided: Verbal  Provided to: Patient  Level of Understanding: Verbalized              Time Calculation:   Start Time: 1303  Stop Time: 1331  Time Calculation (min): 28 min    Therapy Charges for Today     Code Description Service Date Service Provider Modifiers Qty    28015752081 HC PT THER SUPP EA 15 MIN 1/30/2018 Alfonzo Maxwell, PT DPT GP 1    55630012638 HC PT THER PROC EA 15 MIN 1/30/2018 Alfonzo Maxwell, PT DPT GP 1                    Alfonzo Maxwell, PT, DPT, CHT  1/30/2018

## 2018-02-01 ENCOUNTER — HOSPITAL ENCOUNTER (OUTPATIENT)
Dept: PHYSICAL THERAPY | Facility: HOSPITAL | Age: 43
Setting detail: THERAPIES SERIES
Discharge: HOME | End: 2018-02-01

## 2018-02-01 DIAGNOSIS — M77.8 TENDINITIS OF RIGHT SHOULDER: ICD-10-CM

## 2018-02-01 DIAGNOSIS — M75.01 ADHESIVE CAPSULITIS OF RIGHT SHOULDER: Primary | ICD-10-CM

## 2018-02-01 PROCEDURE — 97110 THERAPEUTIC EXERCISES: CPT | Performed by: PHYSICAL THERAPIST

## 2018-02-01 PROCEDURE — 97535 SELF CARE MNGMENT TRAINING: CPT | Performed by: PHYSICAL THERAPIST

## 2018-02-01 NOTE — THERAPY DISCHARGE NOTE
Outpatient Physical Therapy Ortho Progress Note/Discharge Summary  [facility]     Patient Name: Christal Holder  : 1975  MRN: [id number]  Today's Date: 2018      Visit Date: 2018  Attendance:  (no visit limit)  Subjective Improvement: 60-65%  Next MD Appt: PRN  Recert Date: 18      Therapy Diagnosis: 1) R shoulder adhesive capsulitis; 2) R shoulder weakness; 3) pain       Changes in Medications: has restarted her Percocet  Changes in MD Orders: none noted  Number of Work Days Lost: n/a    Visit Dx:    ICD-10-CM ICD-9-CM   1. Adhesive capsulitis of right shoulder M75.01 726.0   2. Tendinitis of right shoulder M75.81 726.10            Past Medical History:   Diagnosis Date   • Anxiety    • Atelectasis    • Chronic pain disorder    • Compression fracture of body of thoracic vertebra     T6-T12   • COPD (chronic obstructive pulmonary disease)     due to trauma   • Depression    • Diabetes mellitus    • Glenoid fracture of shoulder    • Pneumothorax     due to rib fractures   • Rib fracture    • Scapular fracture         Past Surgical History:   Procedure Laterality Date   • CHOLECYSTECTOMY     • GLENOID OPEN REDUCTION INTERNAL FIXATION Right    • SCAPULA OPEN REDUCTION INTERNAL FIXATION     • TUBAL ABDOMINAL LIGATION               PT Ortho       18 1300    Precautions and Contraindications    Precautions vertebral fxs with mal-union  -SS    Subjective Pain    Post-Treatment Pain Level 6  -SS    Subjective Pain Comment patient defers IFC estim  -SS    Posture/Observations    Posture/Observations Comments Guarding R shoulder  -SS    Right Shoulder    Flexion AROM Deficit 130 in supine; 120 seated  -SS    Flexion PROM Deficit 143  -SS    ABduction AROM Deficit 85  -SS    ABduction PROM Deficit 95  -SS    External Rotation AROM Deficit 25  -SS    External Rotation PROM Deficit 45  -SS      18 1300    Precautions and Contraindications    Precautions vertebral fxs with  mal-union  -    Posture/Observations    Posture/Observations Comments Guarding R shoulder. Abrasions B knees. Dorsal portion of sock on left foot is torn. Palpable clunk in shoulder noted during flexion and scaption. Able to decrease the clunk by holding manual pressure to anterior humerus. Not able to modify the clunk during scaption.  -      User Key  (r) = Recorded By, (t) = Taken By, (c) = Cosigned By    Initials Name Provider Type     Alfonzo Maxwell, PT DPT Physical Therapist                            PT Assessment/Plan       02/01/18 1300       PT Assessment    Functional Limitations Limitation in home management;Limitations in community activities;Performance in leisure activities;Performance in self-care ADL;Performance in work activities  -     Impairments Dexterity;Endurance;Muscle strength;Pain;Posture;Range of motion  -     Assessment Comments Patient is at MMI at present. Even though she has had an acute injury to the shoulder, it is still better than when she first presented for this episode of care. We discussed HEP and self-management techniques.  -     Rehab Potential Good   barriers: vertebral fxs, nerve damage, acute fall  -     Patient/caregiver participated in establishment of treatment plan and goals Yes  -     Patient would benefit from skilled therapy intervention No  -     PT Plan    PT Frequency Other (comment)   D/C P.T.  -     PT Plan Comments D/C at this time  -       User Key  (r) = Recorded By, (t) = Taken By, (c) = Cosigned By    Initials Name Provider Type     Alfonzo Maxwell, PT DPT Physical Therapist                Modalities       02/01/18 1300          Subjective Comments    Subjective Comments Patient went to her PCP's office yesterday. X-rays of the shoulder were the same as her previous x-rays. Her arm and shoulder hurts to reach and catches during attempts of elevation. Patient was 65% improved prior to fall. Not doing as well now.  -       Subjective Pain    Able to rate subjective pain? yes  -SS      Pre-Treatment Pain Level 6  -SS      Moist Heat    Location R shoulder  -SS      Rx Minutes 15 mins  -SS      MH Prior to Rx Yes  -SS        User Key  (r) = Recorded By, (t) = Taken By, (c) = Cosigned By    Initials Name Provider Type    EDUARDA Maxwell, PT DPT Physical Therapist                Exercises       02/01/18 1300          Subjective Comments    Subjective Comments Patient went to her PCP's office yesterday. X-rays of the shoulder were the same as her previous x-rays. Her arm and shoulder hurts to reach and catches during attempts of elevation. Patient was 65% improved prior to fall. Not doing as well now.  -SS      Subjective Pain    Able to rate subjective pain? yes  -SS      Pre-Treatment Pain Level 6  -SS      Post-Treatment Pain Level 6  -SS      Subjective Pain Comment patient defers IFC estim  -SS      Exercise 1    Exercise Name 1 PROM flexion  -SS      Cueing 1 Tactile  -SS      Time (Minutes) 1 30  -SS      Exercise 2    Exercise Name 2 PROM abduction  -SS      Cueing 2 Tactile  -SS      Reps 2 30  -SS      Exercise 3    Exercise Name 3 PROM ER/IR  -SS      Cueing 3 Tactile  -SS      Reps 3 30  -SS      Exercise 4    Exercise Name 4 review of HEP and self-management  -SS      Cueing 4 Verbal  -SS        User Key  (r) = Recorded By, (t) = Taken By, (c) = Cosigned By    Initials Name Provider Type    EDUARDA Maxwell, PT DPT Physical Therapist                               PT OP Goals       02/01/18 1300       PT Short Term Goals    STG Date to Achieve --   further deferred  -SS     Long Term Goals    LTG Date to Achieve 01/24/18  -SS     LTG 1 Independent with HEP  -SS     LTG 1 Progress Not Met  -SS     LTG 2 Standing R shoulder flexion AROM to be >/= 100 degrees  -SS     LTG 2 Progress Met  -SS     LTG 3 Supine R shoulder flexion AROM to be >/= 120 degrees  -SS     LTG 3 Progress Met  -SS     LTG 4 Standing R  shoulder abduction AROM to be >/= 75 degrees  -SS     LTG 4 Progress Met  -SS     LTG 5 Report ability to reach to shoulder height to put away dishes  -SS     LTG 5 Progress Not Met  -SS     LTG 6 QuickDASH score to be </= 75  -SS     LTG 6 Progress Not Met  -SS     LTG 7 Standing shoulder flexion AROM to >/= 140 deg  -SS     LTG 7 Progress Not Met  -SS     LTG 8 Standing shoulder abduction AROM to >/= 120 deg  -SS     LTG 8 Progress Not Met  -SS       User Key  (r) = Recorded By, (t) = Taken By, (c) = Cosigned By    Initials Name Provider Type    EDUARDA Maxwell, PT DPT Physical Therapist          Therapy Education  Given: HEP, Other (comment) (self-management)  Program: Reinforced  How Provided: Verbal  Provided to: Patient  Level of Understanding: Verbalized    Outcome Measure Options: Quick DASH  Quick DASH  Open a tight or new jar.: Unable  Do heavy household chores (e.g., wash walls, wash floors): Severe Difficulty  Carry a shopping bag or briefcase: Severe Difficulty  Wash your back: Unable  Use a knife to cut food: Unable  Recreational activities in which you take some force or impact through your arm, should or hand (e.g. golf, hammering, tennis, etc.): Unable  During the past week, to what extent has your arm, shoulder, or hand problem interfered with your normal social activities with family, friends, neighbors or groups?: Quite a bit  During the past week, were you limited in your work or other regular daily activities as a result of your arm, shoulder or hand problem?: Very limited  Arm, Shoulder, or hand pain: Severe  Tingling (pins and needles) in your arm, shoulder, or hand: Moderate  During the past week, how much difficulty have you had sleeping because of the pain in your arm, shoulder or hand?: Severe Difficulty  Number of Questions Answered: 11  Quick DASH Score: 81.82         Time Calculation:   Start Time: 1352  Stop Time: 1439  Time Calculation (min): 47 min    Therapy Charges for  Today     Code Description Service Date Service Provider Modifiers Qty    28498172834 HC PT THER SUPP EA 15 MIN 2/1/2018 Alfonzo Maxwell, PT DPT GP 1    16783447539 HC PT THER PROC EA 15 MIN 2/1/2018 Alfonzo Maxwell, PT DPT GP 1    32344140737 HC PT SELF CARE/MGMT/TRAIN EA 15 MIN 2/1/2018 Alfonzo Maxwell, PT DPT GP 1                      Alfonzo Maxwell, PT, DPT, CHT  2/1/2018

## 2018-02-06 ENCOUNTER — APPOINTMENT (OUTPATIENT)
Dept: PHYSICAL THERAPY | Facility: HOSPITAL | Age: 43
End: 2018-02-06

## 2018-02-08 ENCOUNTER — APPOINTMENT (OUTPATIENT)
Dept: PHYSICAL THERAPY | Facility: HOSPITAL | Age: 43
End: 2018-02-08

## 2018-10-31 ENCOUNTER — TRANSCRIBE ORDERS (OUTPATIENT)
Dept: PHYSICAL THERAPY | Facility: HOSPITAL | Age: 43
End: 2018-10-31

## 2018-10-31 DIAGNOSIS — M25.511 RIGHT SHOULDER PAIN, UNSPECIFIED CHRONICITY: Primary | ICD-10-CM

## 2018-10-31 DIAGNOSIS — M54.6 PAIN IN THORACIC SPINE: ICD-10-CM

## 2018-11-01 ENCOUNTER — HOSPITAL ENCOUNTER (OUTPATIENT)
Dept: PHYSICAL THERAPY | Facility: HOSPITAL | Age: 43
Setting detail: THERAPIES SERIES
Discharge: HOME | End: 2018-11-01

## 2018-11-01 DIAGNOSIS — M54.6 THORACIC SPINE PAIN: ICD-10-CM

## 2018-11-01 DIAGNOSIS — G89.29 CHRONIC RIGHT SHOULDER PAIN: Primary | ICD-10-CM

## 2018-11-01 DIAGNOSIS — M25.511 CHRONIC RIGHT SHOULDER PAIN: Primary | ICD-10-CM

## 2018-11-01 PROCEDURE — 97140 MANUAL THERAPY 1/> REGIONS: CPT

## 2018-11-01 PROCEDURE — 97110 THERAPEUTIC EXERCISES: CPT

## 2018-11-01 PROCEDURE — 97161 PT EVAL LOW COMPLEX 20 MIN: CPT
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
## 2018-11-06 ENCOUNTER — HOSPITAL ENCOUNTER (OUTPATIENT)
Dept: PHYSICAL THERAPY | Facility: HOSPITAL | Age: 43
Setting detail: THERAPIES SERIES
Discharge: HOME | End: 2018-11-06

## 2018-11-06 DIAGNOSIS — M25.511 CHRONIC RIGHT SHOULDER PAIN: Primary | ICD-10-CM

## 2018-11-06 DIAGNOSIS — M54.6 THORACIC SPINE PAIN: ICD-10-CM

## 2018-11-06 DIAGNOSIS — G89.29 CHRONIC RIGHT SHOULDER PAIN: Primary | ICD-10-CM

## 2018-11-06 PROCEDURE — 97110 THERAPEUTIC EXERCISES: CPT | Performed by: PHYSICAL THERAPIST

## 2018-11-06 NOTE — THERAPY TREATMENT NOTE
Outpatient Physical Therapy Ortho Treatment Note  [facility]     Patient Name: Christal Holder  : 1975  MRN: [id number]  Today's Date: 2018      Visit Date: 2018  Attendance: 2/2 (medical necessity)  Subjective Improvement: 0%  Next MD Appt: EMELINA  Recert Date: 18    Visit Dx:    ICD-10-CM ICD-9-CM   1. Chronic right shoulder pain M25.511 719.41    G89.29 338.29   2. Thoracic spine pain M54.6 724.1            Past Medical History:   Diagnosis Date   • Anxiety    • Atelectasis    • Chronic pain disorder    • Compression fracture of body of thoracic vertebra (CMS/HCC)     T6-T12   • COPD (chronic obstructive pulmonary disease) (CMS/HCC)     due to trauma   • Depression    • Diabetes mellitus (CMS/HCC)    • Glenoid fracture of shoulder    • Pneumothorax     due to rib fractures   • Rib fracture    • Scapular fracture         Past Surgical History:   Procedure Laterality Date   • CHOLECYSTECTOMY     • GLENOID OPEN REDUCTION INTERNAL FIXATION Right    • SCAPULA OPEN REDUCTION INTERNAL FIXATION     • TUBAL ABDOMINAL LIGATION               PT Ortho     Row Name 18 1400       Right Upper Ext    Rt Shoulder Abduction AROM 75  -SS    Rt Shoulder Abduction PROM 160  -SS    Rt Shoulder Flexion AROM 85  -SS    Rt Shoulder Flexion PROM 150  -SS    Rt Shoulder External Rotation AROM 60; supine 90/90 position  -SS    Rt Shoulder External Rotation PROM 70  -SS    Rt Shoulder Internal Rotation AROM 45; supine 90/90 position  -SS      User Key  (r) = Recorded By, (t) = Taken By, (c) = Cosigned By    Initials Name Provider Type    Alfonzo Pat, PT DPT Physical Therapist                            PT Assessment/Plan     Row Name 18 1300          Functional Limitations Decreased safety during functional activities;Limitation in home management;Limitations in community activities;Performance in self-care ADL  -SS    Impairments Joint mobility;Muscle strength;Pain;Posture;Range  of motion  -    Assessment Comments Increased active and passive ROM this date compared to initial evaluation. End-range pain present.   -SS    Rehab Potential Fair  -SS    Patient/caregiver participated in establishment of treatment plan and goals Yes  -SS    Patient would benefit from skilled therapy intervention Yes  -SS          PT Frequency 2x/week  -SS    Predicted Duration of Therapy Intervention (Therapy Eval) 6-8 weeks  -SS    PT Plan Comments Continue POC. MFR/myofascial unwinding next. Progress as tolerated.   -SS      User Key  (r) = Recorded By, (t) = Taken By, (c) = Cosigned By    Initials Name Provider Type    Alfonzo Pat, PT DPT Physical Therapist                    Exercises     Row Name 11/06/18 1300          Subjective Comments Could tell that she was sore the afternoon after her evaluation and manual treatment. Didn't really bother her the next day.  -SS          Able to rate subjective pain? yes  -SS    Pre-Treatment Pain Level 4  -SS    Post-Treatment Pain Level 4  -SS          Exercise Name 1 see Manual  -SS          Exercise Name 2 PROM shoulder flexion, abduction  -SS    Cueing 2 Verbal;Tactile  -SS    Reps 2 30 each  -SS          Exercise Name 3 Passive shoulder circles CW/CCW  -SS    Cueing 3 Tactile  -SS    Reps 3 20 each  -SS          Exercise Name 4 Supine active flexion  -SS    Cueing 4 Verbal  -SS    Sets 4 2  -SS    Reps 4 10  -SS          Exercise Name 5 Supine active abduction  -SS    Cueing 5 Verbal  -SS    Sets 5 2  -SS    Reps 5 10  -SS          Exercise Name 6 PROM shoulder IR/ER  -SS    Cueing 6 Verbal;Tactile  -SS    Reps 6 20  -SS          Exercise Name 7 AROM supine shoulder ER/IR  -SS    Cueing 7 Verbal  -SS    Sets 7 2  -SS    Reps 7 10  -SS      User Key  (r) = Recorded By, (t) = Taken By, (c) = Cosigned By    Initials Name Provider Type    Alfonzo Pat, PT DPT Physical Therapist                        Manual Rx (last 36 hours)      Manual  Treatments     Row Name 11/06/18 1300             Manual Rx 1    Manual Rx 1 Location R UT/lev scapula  -SS      Manual Rx 1 Type MFR  -SS      Manual Rx 1 Duration 5 mins  -        User Key  (r) = Recorded By, (t) = Taken By, (c) = Cosigned By    Initials Name Provider Type     Alfonzo Maxwell, PT DPT Physical Therapist                PT OP Goals     Row Name 11/06/18 1300          STG Date to Achieve 11/21/18  -    STG 1 pt will be independent w/ HEP  -SS    STG 1 Progress Ongoing  -SS    STG 2 pt will report pain <4/10  -    STG 2 Progress Ongoing  -SS    STG 3 pt will improve AROM shoulder flex and abd by 10 deg  -    STG 3 Progress Met  -          LTG Date to Achieve 12/27/18  -    LTG 1 pt will improve QuickDASH by 10%  -    LTG 1 Progress Ongoing  -    LTG 2 Standing R shoulder flexion AROM to be >/= 100   -    LTG 2 Progress Ongoing  -    LTG 3 Standing R shoulder abduction AROM to be >/= 75 degrees   -    LTG 3 Progress Ongoing  -          PT Goal Re-Cert Due Date 11/21/18  -      User Key  (r) = Recorded By, (t) = Taken By, (c) = Cosigned By    Initials Name Provider Type     Alfonzo Maxwell, PT DPT Physical Therapist                         Time Calculation:   Start Time: 1349  Stop Time: 1420  Time Calculation (min): 31 min    Therapy Charges for Today     Code Description Service Date Service Provider Modifiers Qty    77066968327 HC PT THER PROC EA 15 MIN 11/6/2018 Alfonzo Maxwell, PT DPT GP 2                    Alfonzo Maxwell, PT, DPT, CHT  11/6/2018

## 2018-11-08 ENCOUNTER — HOSPITAL ENCOUNTER (OUTPATIENT)
Dept: PHYSICAL THERAPY | Facility: HOSPITAL | Age: 43
Setting detail: THERAPIES SERIES
Discharge: HOME | End: 2018-11-08

## 2018-11-08 DIAGNOSIS — G89.29 CHRONIC RIGHT SHOULDER PAIN: Primary | ICD-10-CM

## 2018-11-08 DIAGNOSIS — M54.6 THORACIC SPINE PAIN: ICD-10-CM

## 2018-11-08 DIAGNOSIS — M25.511 CHRONIC RIGHT SHOULDER PAIN: Primary | ICD-10-CM

## 2018-11-08 PROCEDURE — 97140 MANUAL THERAPY 1/> REGIONS: CPT

## 2018-11-08 PROCEDURE — 97110 THERAPEUTIC EXERCISES: CPT

## 2018-11-08 NOTE — THERAPY TREATMENT NOTE
Outpatient Physical Therapy Ortho Treatment Note  [facility]     Patient Name: Christal Holder  : 1975  MRN: [id number]  Today's Date: 2018      Visit Date: 2018   Attendance: 3/3 (medical necessity)  Subjective Improvement: 0%  Next MD Appt: EMELINA  Recert Date: 18       Visit Dx:    ICD-10-CM ICD-9-CM   1. Chronic right shoulder pain M25.511 719.41    G89.29 338.29   2. Thoracic spine pain M54.6 724.1       Patient Active Problem List   Diagnosis   • Suicide ideation   • Severe episode of recurrent major depressive disorder, without psychotic features (CMS/HCC)        Past Medical History:   Diagnosis Date   • Anxiety    • Atelectasis    • Chronic pain disorder    • Compression fracture of body of thoracic vertebra (CMS/HCC)     T6-T12   • COPD (chronic obstructive pulmonary disease) (CMS/HCC)     due to trauma   • Depression    • Diabetes mellitus (CMS/HCC)    • Glenoid fracture of shoulder    • Pneumothorax     due to rib fractures   • Rib fracture    • Scapular fracture         Past Surgical History:   Procedure Laterality Date   • CHOLECYSTECTOMY     • GLENOID OPEN REDUCTION INTERNAL FIXATION Right    • SCAPULA OPEN REDUCTION INTERNAL FIXATION     • TUBAL ABDOMINAL LIGATION               PT Ortho     Row Name 18 1200       Subjective Comments    Subjective Comments The pt reports she felt pretty good after last session. She states the manual and ROM interventions are helping. She consents to tx.   -MW       Subjective Pain    Able to rate subjective pain? yes  -MW    Pre-Treatment Pain Level 5  -MW    Post-Treatment Pain Level 3  -MW       Posture/Observations    Posture/Observations Comments Pt has severe muscle guarding and tightness present throughout R shoulder scap stabilizers and throughout R UT.  Pt TTP in these areas. Pt's skin hot around T6 vertebrae at start of tx.   -MW    Row Name 18 1400       Right Upper Ext    Rt Shoulder Abduction AROM 75  -SS     Rt Shoulder Abduction PROM 160  -SS    Rt Shoulder Flexion AROM 85  -SS    Rt Shoulder Flexion PROM 150  -SS    Rt Shoulder External Rotation AROM 60; supine 90/90 position  -SS    Rt Shoulder External Rotation PROM 70  -SS    Rt Shoulder Internal Rotation AROM 45; supine 90/90 position  -SS      User Key  (r) = Recorded By, (t) = Taken By, (c) = Cosigned By    Initials Name Provider Type     Alfonzo Maxwell, PT DPT Physical Therapist    Kelsy Ayala, PT Physical Therapist                            PT Assessment/Plan     Row Name 11/08/18 1100          PT Assessment    Functional Limitations Decreased safety during functional activities;Limitation in home management;Limitations in community activities;Performance in self-care ADL  -MW     Impairments Joint mobility;Muscle strength;Pain;Posture;Range of motion  -MW     Assessment Comments The pt tolerated tx well today. Pt had extreme R scapular stabilizer guarding today. Manual tx applied accordingly and pt reported soreness but decreased pain following manual intervention. Will cont to progress the pt as tolerated.   -MW     Rehab Potential Fair  -MW     Patient/caregiver participated in establishment of treatment plan and goals Yes  -MW     Patient would benefit from skilled therapy intervention Yes  -MW        PT Plan    PT Frequency 2x/week  -MW     Predicted Duration of Therapy Intervention (Therapy Eval) 6-8 weeks  -MW     PT Plan Comments Cont per PT POC.   -MW       User Key  (r) = Recorded By, (t) = Taken By, (c) = Cosigned By    Initials Name Provider Type    Kelsy Ayala, PT Physical Therapist                    Exercises     Row Name 11/08/18 1200             Subjective Comments    Subjective Comments The pt reports she felt pretty good after last session. She states the manual and ROM interventions are helping. She consents to tx.   -MW         Subjective Pain    Able to rate subjective pain? yes  -MW      Pre-Treatment Pain  Level 5  -MW      Post-Treatment Pain Level 3  -MW         Exercise 1    Exercise Name 1 PROM shoulder (all planes)  -MW      Time 1 16'  -MW         Exercise 2    Exercise Name 2 see manual  -MW        User Key  (r) = Recorded By, (t) = Taken By, (c) = Cosigned By    Initials Name Provider Type    MW Kelsy Hansen, PT Physical Therapist                        Manual Rx (last 36 hours)      Manual Treatments     Row Name 11/08/18 1100             Manual Rx 1    Manual Rx 1 Location Scap stabilizers/UT/R shoulder  -MW      Manual Rx 1 Type MFR for increased tissue extensibility  -MW      Manual Rx 1 Grade cross hands and unwinding technique used  -MW      Manual Rx 1 Duration 20'  -MW         Manual Rx 2    Manual Rx 2 Location R UT  -MW      Manual Rx 2 Type active muscle pump w/ manual triggerpoint release  -MW      Manual Rx 2 Duration 3'  -MW        User Key  (r) = Recorded By, (t) = Taken By, (c) = Cosigned By    Initials Name Provider Type    MW Kelsy Hansen, PT Physical Therapist                PT OP Goals     Row Name 11/08/18 1158          PT Short Term Goals    STG Date to Achieve 11/21/18  -MW     STG 1 pt will be independent w/ HEP  -MW     STG 1 Progress Ongoing  -MW     STG 2 pt will report pain <4/10  -MW     STG 2 Progress Ongoing  -MW     STG 3 pt will improve AROM shoulder flex and abd by 10 deg  -MW     STG 3 Progress Met  -MW        Long Term Goals    LTG Date to Achieve 12/27/18  -MW     LTG 1 pt will improve QuickDASH by 10%  -MW     LTG 1 Progress Ongoing  -MW     LTG 2 Standing R shoulder flexion AROM to be >/= 100   -MW     LTG 2 Progress Ongoing  -MW     LTG 3 Standing R shoulder abduction AROM to be >/= 75 degrees   -MW     LTG 3 Progress Ongoing  -MW        Time Calculation    PT Goal Re-Cert Due Date 11/21/18  -MW       User Key  (r) = Recorded By, (t) = Taken By, (c) = Cosigned By    Initials Name Provider Type    MW Kelsy Hansen, PT Physical Therapist                          Time Calculation:   Start Time: 1107  Stop Time: 1146  Time Calculation (min): 39 min  Total Timed Code Minutes- PT: 39 minute(s)    Therapy Charges for Today     Code Description Service Date Service Provider Modifiers Qty    64609399721 HC PT MANUAL THERAPY EA 15 MIN 11/8/2018 Kelsy Hansen, PT GP 2    65310113875 HC PT THER PROC EA 15 MIN 11/8/2018 Kelsy Hansen, PT GP 1                    Kelsy Hansen, PT  11/8/2018

## 2018-11-13 ENCOUNTER — HOSPITAL ENCOUNTER (OUTPATIENT)
Dept: PHYSICAL THERAPY | Facility: HOSPITAL | Age: 43
Setting detail: THERAPIES SERIES
Discharge: HOME | End: 2018-11-13

## 2018-11-13 DIAGNOSIS — G89.29 CHRONIC RIGHT SHOULDER PAIN: Primary | ICD-10-CM

## 2018-11-13 DIAGNOSIS — M25.511 CHRONIC RIGHT SHOULDER PAIN: Primary | ICD-10-CM

## 2018-11-13 DIAGNOSIS — M54.6 THORACIC SPINE PAIN: ICD-10-CM

## 2018-11-13 PROCEDURE — 97110 THERAPEUTIC EXERCISES: CPT

## 2018-11-13 PROCEDURE — 97140 MANUAL THERAPY 1/> REGIONS: CPT

## 2018-11-13 NOTE — THERAPY TREATMENT NOTE
Outpatient Physical Therapy Ortho Treatment Note  [facility]     Patient Name: Christal Holder  : 1975  MRN: [id number]  Today's Date: 2018      Visit Date: 2018  Attendance: 4/ (medical necessity)  Subjective Improvement: 0%  Next MD Appt: EMELINA  Recert Date: 18          Visit Dx:    ICD-10-CM ICD-9-CM   1. Chronic right shoulder pain M25.511 719.41    G89.29 338.29   2. Thoracic spine pain M54.6 724.1       Patient Active Problem List   Diagnosis   • Suicide ideation   • Severe episode of recurrent major depressive disorder, without psychotic features (CMS/HCC)        Past Medical History:   Diagnosis Date   • Anxiety    • Atelectasis    • Chronic pain disorder    • Compression fracture of body of thoracic vertebra (CMS/HCC)     T6-T12   • COPD (chronic obstructive pulmonary disease) (CMS/HCC)     due to trauma   • Depression    • Diabetes mellitus (CMS/HCC)    • Glenoid fracture of shoulder    • Pneumothorax     due to rib fractures   • Rib fracture    • Scapular fracture         Past Surgical History:   Procedure Laterality Date   • CHOLECYSTECTOMY     • GLENOID OPEN REDUCTION INTERNAL FIXATION Right    • SCAPULA OPEN REDUCTION INTERNAL FIXATION     • TUBAL ABDOMINAL LIGATION         PT Ortho     Row Name 18 1600       Posture/Observations    Posture/Observations Comments  Pt w/ severe muscle guarding beyond 90 deg of PROM abduction and flexion this date.  -MW      User Key  (r) = Recorded By, (t) = Taken By, (c) = Cosigned By    Initials Name Provider Type    Kelsy Ayala, PT Physical Therapist                      PT Assessment/Plan     Row Name 18 1600          PT Assessment    Functional Limitations  Decreased safety during functional activities;Limitation in home management;Limitations in community activities;Performance in self-care ADL  -MW     Impairments  Joint mobility;Muscle strength;Pain;Posture;Range of motion  -MW     Assessment Comments  The  pt had increased stiffness throughout her R shoulder today. She had extreme guarding present in R UT/anterior neck this date. Manual tx applied accordingly and pt w/ soft-tissue improvements following tx. Will cont to progress as tolerated.   -MW     Rehab Potential  Fair  -MW     Patient/caregiver participated in establishment of treatment plan and goals  Yes  -MW     Patient would benefit from skilled therapy intervention  Yes  -MW        PT Plan    PT Frequency  2x/week  -MW     Predicted Duration of Therapy Intervention (Therapy Eval)  6-8 weeks  -MW     PT Plan Comments  Cont per PT POC. Possibly attempt dry-needling if pt appropriate.   -MW       User Key  (r) = Recorded By, (t) = Taken By, (c) = Cosigned By    Initials Name Provider Type    Kelsy Ayala, PT Physical Therapist              Exercises     Row Name 11/13/18 1600             Subjective Comments    Subjective Comments  The pt reports she had a lot of soreness after last session. She states it took a couple days for the soreness to set in. She states she is feeling better now. She consents to tx.   -MW         Subjective Pain    Able to rate subjective pain?  yes  -MW      Pre-Treatment Pain Level  5  -MW      Post-Treatment Pain Level  5  -MW         Exercise 1    Exercise Name 1  PROM shoulder (all planes)  -MW      Time 1  15'  -MW         Exercise 2    Exercise Name 2  see manual  -MW        User Key  (r) = Recorded By, (t) = Taken By, (c) = Cosigned By    Initials Name Provider Type    Kelsy Ayala, PT Physical Therapist                        Manual Rx (last 36 hours)      Manual Treatments     Row Name 11/13/18 1500             Manual Rx 1    Manual Rx 1 Location  Scap stabilizers/UT/supraclavicular triangle  -MW      Manual Rx 1 Type  MFR for increased tissue extensibility  -MW      Manual Rx 1 Grade  cross hands and unwinding technique used  -MW      Manual Rx 1 Duration  15'  -MW         Manual Rx 2    Manual Rx 2 Location  R  UT  -MW      Manual Rx 2 Type  active muscle pump w/ manual triggerpoint release  -MW      Manual Rx 2 Duration  3'  -MW         Manual Rx 3    Manual Rx 3 Location  R shoulder mobs  -MW      Manual Rx 3 Type  distaction, inferior glide  -MW      Manual Rx 3 Duration  3'  -MW         Manual Rx 4    Manual Rx 4 Location  R scap glides- manual resist  -MW      Manual Rx 4 Type  protraction/retraction  -MW      Manual Rx 4 Duration  2'  -MW        User Key  (r) = Recorded By, (t) = Taken By, (c) = Cosigned By    Initials Name Provider Type    MW Kelsy Hansen, PT Physical Therapist          PT OP Goals     Row Name 11/13/18 1158          PT Short Term Goals    STG Date to Achieve  11/21/18  -MW     STG 1  pt will be independent w/ HEP  -MW     STG 1 Progress  Ongoing  -MW     STG 2  pt will report pain <4/10  -MW     STG 2 Progress  Ongoing  -MW     STG 3  pt will improve AROM shoulder flex and abd by 10 deg  -MW     STG 3 Progress  Met  -MW        Long Term Goals    LTG Date to Achieve  12/27/18  -MW     LTG 1  pt will improve QuickDASH by 10%  -MW     LTG 1 Progress  Ongoing  -MW     LTG 2  Standing R shoulder flexion AROM to be >/= 100   -MW     LTG 2 Progress  Ongoing  -MW     LTG 3  Standing R shoulder abduction AROM to be >/= 75 degrees   -MW     LTG 3 Progress  Ongoing  -MW        Time Calculation    PT Goal Re-Cert Due Date  01/21/18  -MW       User Key  (r) = Recorded By, (t) = Taken By, (c) = Cosigned By    Initials Name Provider Type    MW Kelsy Hansen, PT Physical Therapist                         Time Calculation:   Start Time: 1110  Stop Time: 1149  Time Calculation (min): 39 min  PT Non-Billable Time (min): 1 min  Total Timed Code Minutes- PT: 38 minute(s)    Therapy Charges for Today     Code Description Service Date Service Provider Modifiers Qty    03899132028 HC PT MANUAL THERAPY EA 15 MIN 11/13/2018 Kelsy Hansen, PT GP 2    77655346524 HC PT THER PROC EA 15 MIN 11/13/2018 Kelsy Hansen  BERTIN, PT GP 1                    Kelsy Hansen, PT  11/13/2018

## 2018-11-15 ENCOUNTER — HOSPITAL ENCOUNTER (OUTPATIENT)
Dept: PHYSICAL THERAPY | Facility: HOSPITAL | Age: 43
Setting detail: THERAPIES SERIES
Discharge: HOME | End: 2018-11-15

## 2018-11-15 DIAGNOSIS — M54.6 THORACIC SPINE PAIN: ICD-10-CM

## 2018-11-15 DIAGNOSIS — G89.29 CHRONIC RIGHT SHOULDER PAIN: Primary | ICD-10-CM

## 2018-11-15 DIAGNOSIS — M25.511 CHRONIC RIGHT SHOULDER PAIN: Primary | ICD-10-CM

## 2018-11-15 PROCEDURE — 97110 THERAPEUTIC EXERCISES: CPT | Performed by: PHYSICAL THERAPIST

## 2018-11-16 NOTE — THERAPY TREATMENT NOTE
Outpatient Physical Therapy Ortho Treatment Note  [facility]     Patient Name: Christal Holder  : 1975  MRN: [id number]  Today's Date: 11/15/2018      Visit Date: 11/15/2018  Attendance:  (medical necessity)  Subjective Improvement: unsure  Next MD Appt: EMELINA  Recert Date: 18    Visit Dx:    ICD-10-CM ICD-9-CM   1. Chronic right shoulder pain M25.511 719.41    G89.29 338.29   2. Thoracic spine pain M54.6 724.1       Patient Active Problem List   Diagnosis   • Suicide ideation   • Severe episode of recurrent major depressive disorder, without psychotic features (CMS/HCC)        Past Medical History:   Diagnosis Date   • Anxiety    • Atelectasis    • Chronic pain disorder    • Compression fracture of body of thoracic vertebra (CMS/HCC)     T6-T12   • COPD (chronic obstructive pulmonary disease) (CMS/HCC)     due to trauma   • Depression    • Diabetes mellitus (CMS/HCC)    • Glenoid fracture of shoulder    • Pneumothorax     due to rib fractures   • Rib fracture    • Scapular fracture         Past Surgical History:   Procedure Laterality Date   • CHOLECYSTECTOMY     • GLENOID OPEN REDUCTION INTERNAL FIXATION Right    • SCAPULA OPEN REDUCTION INTERNAL FIXATION     • TUBAL ABDOMINAL LIGATION         PT Ortho     Row Name 11/15/18 1100       Subjective Pain    Post-Treatment Pain Level  4  -SS       Right Upper Ext    Rt Shoulder Abduction AROM  75  -SS    Rt Shoulder Flexion AROM  70  -SS    Rt Shoulder External Rotation AROM  70; supine 90/90  -SS    Rt Shoulder Internal Rotation AROM  43; supine 90/90 position; catches   -SS    Row Name 18 1600       Posture/Observations    Posture/Observations Comments  Pt w/ severe muscle guarding beyond 90 deg of PROM abduction and flexion this date.  -MW      User Key  (r) = Recorded By, (t) = Taken By, (c) = Cosigned By    Initials Name Provider Type    Alfonzo Pat, PT DPT Physical Therapist    Kelsy Ayala PT Physical  Therapist                      PT Assessment/Plan     Row Name 11/15/18 1100          PT Assessment    Functional Limitations  Decreased safety during functional activities;Limitation in home management;Limitations in community activities;Performance in self-care ADL  -SS     Impairments  Joint mobility;Muscle strength;Pain;Posture;Range of motion  -     Assessment Comments  PROM maintains. Decreased AROM compared to last. More painful and stiff generally due to cold weather.   -SS     Rehab Potential  Fair  -SS     Patient/caregiver participated in establishment of treatment plan and goals  Yes  -SS     Patient would benefit from skilled therapy intervention  Yes  -SS        PT Plan    PT Frequency  2x/week  -SS     Predicted Duration of Therapy Intervention (Therapy Eval)  6-8 weeks  -     PT Plan Comments  ROM, strengthening, manual  -SS       User Key  (r) = Recorded By, (t) = Taken By, (c) = Cosigned By    Initials Name Provider Type    Alfonzo Pat, PT DPT Physical Therapist          Modalities     Row Name 11/15/18 1100             Moist Heat    MH Applied  Yes  -SS      Location  trunk and R shoulder  -SS      Rx Minutes  10 mins  -SS      MH Prior to Rx  Yes  -SS      MH S/P Rx  No  -SS        User Key  (r) = Recorded By, (t) = Taken By, (c) = Cosigned By    Initials Name Provider Type    Alfonzo Pat, PT DPT Physical Therapist          Exercises     Row Name 11/15/18 1100             Subjective Comments    Subjective Comments  Wasn't near as sore after last session as she was the one before. Feeling stiff today.  -SS         Subjective Pain    Able to rate subjective pain?  yes  -SS      Pre-Treatment Pain Level  4  -SS      Post-Treatment Pain Level  4  -SS         Exercise 1    Exercise Name 1  MHP -- see Modalities  -SS         Exercise 2    Exercise Name 2  PROM shoulder all planes  -SS      Cueing 2  Tactile  -SS         Exercise 3    Exercise Name 3  Manually resisted  shoulder flexion, abduction, IR/ER  -SS      Cueing 3  Verbal;Tactile  -SS      Sets 3  2  -SS      Reps 3  10 each  -SS        User Key  (r) = Recorded By, (t) = Taken By, (c) = Cosigned By    Initials Name Provider Type    Alfonzo Pat, PT DPT Physical Therapist                         PT OP Goals     Row Name 11/15/18 1100          PT Short Term Goals    STG Date to Achieve  11/21/18  -     STG 1  pt will be independent w/ HEP  -SS     STG 1 Progress  Ongoing  -SS     STG 2  pt will report pain <4/10  -SS     STG 2 Progress  Ongoing  -SS     STG 3  pt will improve AROM shoulder flex and abd by 10 deg  -     STG 3 Progress  Met  -SS        Long Term Goals    LTG Date to Achieve  12/27/18  -SS     LTG 1  pt will improve QuickDASH by 10%  -     LTG 1 Progress  Ongoing  -SS     LTG 2  Standing R shoulder flexion AROM to be >/= 100   -     LTG 2 Progress  Ongoing  -SS     LTG 3  Standing R shoulder abduction AROM to be >/= 75 degrees   -     LTG 3 Progress  Ongoing  -        Time Calculation    PT Goal Re-Cert Due Date  11/21/18  -       User Key  (r) = Recorded By, (t) = Taken By, (c) = Cosigned By    Initials Name Provider Type     Alfonzo Maxwell, PT DPT Physical Therapist                         Time Calculation:   Start Time: 1106  Stop Time: 1152  Time Calculation (min): 46 min    Therapy Charges for Today     Code Description Service Date Service Provider Modifiers Qty    22080302464 HC PT THER PROC EA 15 MIN 11/15/2018 Alfonzo Maxwell, PT DPT GP 3                    Alfonzo Maxwell, PT, DPT, CHT  11/15/2018

## 2018-11-20 ENCOUNTER — HOSPITAL ENCOUNTER (OUTPATIENT)
Dept: PHYSICAL THERAPY | Facility: HOSPITAL | Age: 43
Setting detail: THERAPIES SERIES
Discharge: HOME | End: 2018-11-20

## 2018-11-20 DIAGNOSIS — G89.29 CHRONIC RIGHT SHOULDER PAIN: Primary | ICD-10-CM

## 2018-11-20 DIAGNOSIS — M54.6 THORACIC SPINE PAIN: ICD-10-CM

## 2018-11-20 DIAGNOSIS — M25.511 CHRONIC RIGHT SHOULDER PAIN: Primary | ICD-10-CM

## 2018-11-20 PROCEDURE — G0283 ELEC STIM OTHER THAN WOUND: HCPCS

## 2018-11-20 PROCEDURE — 97140 MANUAL THERAPY 1/> REGIONS: CPT

## 2018-11-20 PROCEDURE — 97110 THERAPEUTIC EXERCISES: CPT

## 2018-11-20 NOTE — THERAPY TREATMENT NOTE
Outpatient Physical Therapy Ortho Treatment Note  [facility]     Patient Name: Christal Holder  : 1975  MRN: [id number]  Today's Date: 2018      Visit Date: 2018   Attendance:  (medical necessity)  Subjective Improvement: unsure  Next MD Appt: EMELINA  Recert Date: 18-- next        Visit Dx:    ICD-10-CM ICD-9-CM   1. Chronic right shoulder pain M25.511 719.41    G89.29 338.29   2. Thoracic spine pain M54.6 724.1       Patient Active Problem List   Diagnosis   • Suicide ideation   • Severe episode of recurrent major depressive disorder, without psychotic features (CMS/HCC)        Past Medical History:   Diagnosis Date   • Anxiety    • Atelectasis    • Chronic pain disorder    • Compression fracture of body of thoracic vertebra (CMS/HCC)     T6-T12   • COPD (chronic obstructive pulmonary disease) (CMS/HCC)     due to trauma   • Depression    • Diabetes mellitus (CMS/HCC)    • Glenoid fracture of shoulder    • Pneumothorax     due to rib fractures   • Rib fracture    • Scapular fracture         Past Surgical History:   Procedure Laterality Date   • CHOLECYSTECTOMY     • GLENOID OPEN REDUCTION INTERNAL FIXATION Right    • SCAPULA OPEN REDUCTION INTERNAL FIXATION     • TUBAL ABDOMINAL LIGATION         PT Ortho     Row Name 18 1500       Subjective Pain    Post-Treatment Pain Level  5  -MW       Posture/Observations    Posture/Observations Comments  Increased tightness present throughout R shoulder.   -MW       Right Upper Ext    Rt Shoulder Abduction PROM  170  -MW    Rt Shoulder Flexion PROM  140  -MW    Rt Shoulder External Rotation PROM  70  -MW      User Key  (r) = Recorded By, (t) = Taken By, (c) = Cosigned By    Initials Name Provider Type    Kelsy Ayala, PT Physical Therapist                      PT Assessment/Plan     Row Name 18 1600          PT Assessment    Functional Limitations  Decreased safety during functional activities;Limitation in home  management;Limitations in community activities;Performance in self-care ADL  -MW     Impairments  Joint mobility;Muscle strength;Pain;Posture;Range of motion  -MW     Assessment Comments  IFC w/ heat performed prior to ROM this date secondary to pt's severe muscle guarding present at beginning of session. The pt responded well to IFC and had mild pain relief at conclusion of tx. The pt's progress w/ rehab continues to be impacted by the guarding the pt is experiencing. Will cont to progress her as tolerated.   -MW     Rehab Potential  Fair  -MW     Patient/caregiver participated in establishment of treatment plan and goals  Yes  -MW     Patient would benefit from skilled therapy intervention  Yes  -MW        PT Plan    PT Frequency  2x/week  -MW     Predicted Duration of Therapy Intervention (Therapy Eval)  6-8 weeks  -MW     PT Plan Comments  Cont per PT POC. Recert next week.  -MW       User Key  (r) = Recorded By, (t) = Taken By, (c) = Cosigned By    Initials Name Provider Type    MW Kelsy Hansen, PT Physical Therapist          Modalities     Row Name 11/20/18 1500             Moist Heat    MH Applied  Yes  -MW      Location   R shoulder w/ IFC  -MW      Rx Minutes  10 mins  -MW      MH Prior to Rx  Yes  -MW         ELECTRICAL STIMULATION    Attended/Unattended  Unattended  -MW      Stimulation Type  IFC  -MW      Max mAmp  5  -MW      Location/Electrode Placement/Other  R shoulder--- 10 mins  -MW        User Key  (r) = Recorded By, (t) = Taken By, (c) = Cosigned By    Initials Name Provider Type    MW Kelsy Hansen, PT Physical Therapist          Exercises     Row Name 11/20/18 1500             Subjective Comments    Subjective Comments  The pt states she feels really stiff today. She states she thinks the cold weather has kicked in and is affecting her progress. She consents to tx.   -MW         Subjective Pain    Able to rate subjective pain?  yes  -MW      Pre-Treatment Pain Level  6  -MW       Post-Treatment Pain Level  5  -MW         Exercise 1    Exercise Name 1  PROM shoulder (all planes)  -MW      Time 1  18'  -MW        User Key  (r) = Recorded By, (t) = Taken By, (c) = Cosigned By    Initials Name Provider Type    MW Kelsy Hansen, PT Physical Therapist                        Manual Rx (last 36 hours)      Manual Treatments     Row Name 11/20/18 1700             Manual Rx 1    Manual Rx 1 Location  Scap stabilizers/UT/supraclavicular triangle  -MW      Manual Rx 1 Type  MFR for increased tissue extensibility  -MW      Manual Rx 1 Grade  cross hands and unwinding technique used  -MW      Manual Rx 1 Duration  10'  -MW        User Key  (r) = Recorded By, (t) = Taken By, (c) = Cosigned By    Initials Name Provider Type    MW Kelsy Hansen, PT Physical Therapist          PT OP Goals     Row Name 11/20/18 1500          PT Short Term Goals    STG Date to Achieve  11/21/18  -MW     STG 1  pt will be independent w/ HEP  -MW     STG 1 Progress  Ongoing  -MW     STG 2  pt will report pain <4/10  -MW     STG 2 Progress  Ongoing  -MW     STG 3  pt will improve AROM shoulder flex and abd by 10 deg  -MW     STG 3 Progress  Met  -MW        Long Term Goals    LTG Date to Achieve  12/27/18  -MW     LTG 1  pt will improve QuickDASH by 10%  -MW     LTG 1 Progress  Ongoing  -MW     LTG 2  Standing R shoulder flexion AROM to be >/= 100   -MW     LTG 2 Progress  Ongoing  -MW     LTG 3  Standing R shoulder abduction AROM to be >/= 75 degrees   -MW     LTG 3 Progress  Ongoing  -MW        Time Calculation    PT Goal Re-Cert Due Date  11/21/18  -MW       User Key  (r) = Recorded By, (t) = Taken By, (c) = Cosigned By    Initials Name Provider Type    MW Kelsy Hansen, PT Physical Therapist                         Time Calculation:   Start Time: 1552  Stop Time: 1630  Time Calculation (min): 38 min  Total Timed Code Minutes- PT: 28 minute(s)    Therapy Charges for Today     Code Description Service Date Service  Provider Modifiers Qty    72555321062 HC PT THER SUPP EA 15 MIN 11/20/2018 Kelsy Hansen, PT GP 1    37949404633 HC PT MANUAL THERAPY EA 15 MIN 11/20/2018 Kelsy Hansen, PT GP 1    10348929604 HC PT THER PROC EA 15 MIN 11/20/2018 Kelsy Hansen, PT GP 1    00555972374 HC PT ELECTRICAL STIM UNATTENDED 11/20/2018 Kelsy Hansen, PT  1                    Kelsy Hansen, PT  11/20/2018

## 2018-11-26 ENCOUNTER — HOSPITAL ENCOUNTER (OUTPATIENT)
Dept: PHYSICAL THERAPY | Facility: HOSPITAL | Age: 43
Setting detail: THERAPIES SERIES
Discharge: HOME | End: 2018-11-26

## 2018-11-26 DIAGNOSIS — G89.29 CHRONIC RIGHT SHOULDER PAIN: Primary | ICD-10-CM

## 2018-11-26 DIAGNOSIS — M75.01 ADHESIVE CAPSULITIS OF RIGHT SHOULDER: ICD-10-CM

## 2018-11-26 DIAGNOSIS — M54.6 THORACIC SPINE PAIN: ICD-10-CM

## 2018-11-26 DIAGNOSIS — M25.511 CHRONIC RIGHT SHOULDER PAIN: Primary | ICD-10-CM

## 2018-11-26 PROCEDURE — 97110 THERAPEUTIC EXERCISES: CPT | Performed by: PHYSICAL THERAPIST

## 2018-11-26 PROCEDURE — 97140 MANUAL THERAPY 1/> REGIONS: CPT | Performed by: PHYSICAL THERAPIST

## 2018-11-27 NOTE — THERAPY TREATMENT NOTE
"    Outpatient Physical Therapy Ortho Treatment Note  [facility]     Patient Name: Christal Holder  : 1975  MRN: [id number]  Today's Date: 2018      Visit Date: 2018  Attendance:  (medical necessity)  Subjective Improvement: unsure  Next MD Appt: EMELINA  Recert Date: 18      Visit Dx:    ICD-10-CM ICD-9-CM   1. Chronic right shoulder pain M25.511 719.41    G89.29 338.29   2. Thoracic spine pain M54.6 724.1   3. Adhesive capsulitis of right shoulder M75.01 726.0       Patient Active Problem List   Diagnosis   • Suicide ideation   • Severe episode of recurrent major depressive disorder, without psychotic features (CMS/HCC)        Past Medical History:   Diagnosis Date   • Anxiety    • Atelectasis    • Chronic pain disorder    • Compression fracture of body of thoracic vertebra (CMS/HCC)     T6-T12   • COPD (chronic obstructive pulmonary disease) (CMS/HCC)     due to trauma   • Depression    • Diabetes mellitus (CMS/HCC)    • Glenoid fracture of shoulder    • Pneumothorax     due to rib fractures   • Rib fracture    • Scapular fracture         Past Surgical History:   Procedure Laterality Date   • CHOLECYSTECTOMY     • GLENOID OPEN REDUCTION INTERNAL FIXATION Right    • SCAPULA OPEN REDUCTION INTERNAL FIXATION     • TUBAL ABDOMINAL LIGATION         PT Ortho     Row Name 18 1600       Subjective Comments    Subjective Comments  Pain again today. Pain is more in the thoracic spine than her shoulder right now. Moving her back \"a whole lot\" increases her back pain. \"I can feel it\" with shoulder motion.  -SS       Subjective Pain    Able to rate subjective pain?  yes  -SS    Pre-Treatment Pain Level  5  -SS       Posture/Observations    Posture/Observations Comments  Increased tone R thoracic paraspinals  -SS      User Key  (r) = Recorded By, (t) = Taken By, (c) = Cosigned By    Initials Name Provider Type    SS Alfonzo Maxwell, PT DPT Physical Therapist    " "                  PT Assessment/Plan     Row Name 11/26/18 1600          Functional Limitations  Decreased safety during functional activities;Limitation in home management;Limitations in community activities;Performance in self-care ADL  -SS    Impairments  Joint mobility;Muscle strength;Pain;Posture;Range of motion  -    Assessment Comments  Patient reports less pain and feels looser in thoracic spine after MFR.   -SS    Rehab Potential  Fair  -SS    Patient/caregiver participated in establishment of treatment plan and goals  Yes  -SS    Patient would benefit from skilled therapy intervention  Yes  -SS          PT Frequency  2x/week  -SS    Predicted Duration of Therapy Intervention (Therapy Eval)  6-8 weeks  -SS    PT Plan Comments  Recheck next. Progress AROM and stabilization exercises as tolerated.  -SS      User Key  (r) = Recorded By, (t) = Taken By, (c) = Cosigned By    Initials Name Provider Type    Alfonzo Pat, PT DPT Physical Therapist          Modalities     Row Name 11/26/18 1600             Moist Heat    MH Applied  Yes  -SS      Location  trunk and R shoulder  -      Rx Minutes  12 mins  -SS      MH Prior to Rx  Yes  -SS        User Key  (r) = Recorded By, (t) = Taken By, (c) = Cosigned By    Initials Name Provider Type    Alfonzo Pat, PT DPT Physical Therapist          Exercises     Row Name 11/26/18 1600          Subjective Comments  Pain again today. Pain is more in the thoracic spine than her shoulder right now. Moving her back \"a whole lot\" increases her back pain. \"I can feel it\" with shoulder motion.  -SS          Able to rate subjective pain?  yes  -SS    Pre-Treatment Pain Level  5  -SS    Post-Treatment Pain Level  4  -          Exercise Name 1  MHP -- see Modalities  -SS          Exercise Name 2  see Manual  -          Exercise Name 3  Shoulder flexion AROM reclined at 30 deg  -    Cueing 3  Verbal  -    Sets 3  2  -SS    Reps 3  15  -SS          " Exercise Name 4  Isometric shoulder extension into table while reclined 30 deg  -SS    Cueing 4  Verbal  -SS    Sets 4  1  -SS    Reps 4  15  -SS    Time 4  5 sec hold  -SS          Exercise Name 5  Scapular retraction into table while reclined 30 deg  -SS    Cueing 5  Verbal  -SS    Sets 5  1  -SS    Reps 5  10  -SS    Time 5  5 sec hold  -SS          Exercise Name 6  Scapular depression  -SS    Cueing 6  Verbal;Tactile  -SS    Sets 6  2  -SS    Reps 6  10  -SS      User Key  (r) = Recorded By, (t) = Taken By, (c) = Cosigned By    Initials Name Provider Type    Alfonzo Pat, PT DPT Physical Therapist                        Manual Rx (last 36 hours)      Manual Treatments     Row Name 11/26/18 1500             Manual Rx 1    Manual Rx 1 Location  R thoracic paraspinals  -SS      Manual Rx 1 Type  MFR  -SS      Manual Rx 1 Duration  15 mins  -SS        User Key  (r) = Recorded By, (t) = Taken By, (c) = Cosigned By    Initials Name Provider Type    Alfonzo Pat, PT DPT Physical Therapist          PT OP Goals     Row Name 11/26/18 1600          STG Date to Achieve  11/21/18  -SS    STG 1  pt will be independent w/ HEP  -SS    STG 1 Progress  Ongoing  -SS    STG 2  pt will report pain <4/10  -SS    STG 2 Progress  Ongoing  -SS    STG 3  pt will improve AROM shoulder flex and abd by 10 deg  -SS    STG 3 Progress  Met  -SS          LTG Date to Achieve  12/27/18  -SS    LTG 1  pt will improve QuickDASH by 10%  -    LTG 1 Progress  Ongoing  -SS    LTG 2  Standing R shoulder flexion AROM to be >/= 100   -SS    LTG 2 Progress  Ongoing  -SS    LTG 3  Standing R shoulder abduction AROM to be >/= 75 degrees   -    LTG 3 Progress  Ongoing  -SS          PT Goal Re-Cert Due Date  11/21/18  -      User Key  (r) = Recorded By, (t) = Taken By, (c) = Cosigned By    Initials Name Provider Type    Alfonzo Pat, PT DPT Physical Therapist                         Time Calculation:   Start Time:  1620  Stop Time: 1705  Time Calculation (min): 45 min    Therapy Charges for Today     Code Description Service Date Service Provider Modifiers Qty    51413729769 HC PT THER SUPP EA 15 MIN 11/26/2018 Alfonzo Maxwell, PT DPT GP 1    77999560105 HC PT MANUAL THERAPY EA 15 MIN 11/26/2018 Alfonzo Maxwell, PT DPT GP 1    [id number]  PT THER PROC EA 15 MIN 11/26/2018 Alfonzo Maxwell, PT DPT GP 1                    Alfonzo Maxwell, PT, DPT, CHT  11/26/2018

## 2018-11-29 ENCOUNTER — HOSPITAL ENCOUNTER (OUTPATIENT)
Dept: PHYSICAL THERAPY | Facility: HOSPITAL | Age: 43
Setting detail: THERAPIES SERIES
Discharge: HOME | End: 2018-11-29

## 2018-11-29 DIAGNOSIS — M54.6 THORACIC SPINE PAIN: ICD-10-CM

## 2018-11-29 DIAGNOSIS — M25.511 CHRONIC RIGHT SHOULDER PAIN: Primary | ICD-10-CM

## 2018-11-29 DIAGNOSIS — G89.29 CHRONIC RIGHT SHOULDER PAIN: Primary | ICD-10-CM

## 2018-11-29 PROCEDURE — 97140 MANUAL THERAPY 1/> REGIONS: CPT

## 2018-11-29 PROCEDURE — 97110 THERAPEUTIC EXERCISES: CPT

## 2018-11-30 NOTE — THERAPY PROGRESS REPORT/RE-CERT
Outpatient Physical Therapy Ortho Progress Note  [facility]     Patient Name: Christal Holder  : 1975  MRN: [id number]  Today's Date: 2018      Visit Date: 2018   Attendance:  (medical necessity)  Subjective Improvement: unsure  Next MD Appt: EMELINA  Recert Date: 18           Patient Active Problem List   Diagnosis   • Suicide ideation   • Severe episode of recurrent major depressive disorder, without psychotic features (CMS/HCC)        Past Medical History:   Diagnosis Date   • Anxiety    • Atelectasis    • Chronic pain disorder    • Compression fracture of body of thoracic vertebra (CMS/HCC)     T6-T12   • COPD (chronic obstructive pulmonary disease) (CMS/HCC)     due to trauma   • Depression    • Diabetes mellitus (CMS/HCC)    • Glenoid fracture of shoulder    • Pneumothorax     due to rib fractures   • Rib fracture    • Scapular fracture         Past Surgical History:   Procedure Laterality Date   • CHOLECYSTECTOMY     • GLENOID OPEN REDUCTION INTERNAL FIXATION Right    • SCAPULA OPEN REDUCTION INTERNAL FIXATION     • TUBAL ABDOMINAL LIGATION         Visit Dx:     ICD-10-CM ICD-9-CM   1. Chronic right shoulder pain M25.511 719.41    G89.29 338.29   2. Thoracic spine pain M54.6 724.1           PT Ortho     Row Name 18 1500       Subjective Comments    Subjective Comments  Pt states she feels like her stiffness has worsened over the last couple weeks. She states the cold weather has impacted this. She consents to reassess and tx.   -MW       Subjective Pain    Able to rate subjective pain?  yes  -MW    Pre-Treatment Pain Level  5  -MW       Posture/Observations    Posture/Observations Comments  Pt has severely increased tone throughout teres minor/major muscles and latissimus. Pt TTP throughout scap stabilizers. Pt has forward head and rounded shoulders.  -MW       Right Upper Ext    Rt Shoulder Abduction AROM  80  -MW    Rt Shoulder Abduction PROM  160  -MW    Rt  Shoulder Flexion AROM  90  -MW    Rt Shoulder Flexion PROM  160  -MW    Rt Shoulder External Rotation PROM  70  -MW       MMT (Manual Muscle Testing)    General MMT Comments  not appropriate  -MW      User Key  (r) = Recorded By, (t) = Taken By, (c) = Cosigned By    Initials Name Provider Type    MW Kelsy Hansen, PT Physical Therapist                            PT OP Goals     Row Name 11/29/18 1600          PT Short Term Goals    STG Date to Achieve  12/18/18  -MW     STG 1  pt will be independent w/ HEP  -MW     STG 1 Progress  Ongoing  -MW     STG 2  pt will report pain <4/10  -MW     STG 2 Progress  Ongoing  -MW     STG 3  pt will improve AROM shoulder flex and abd by 10 deg  -MW     STG 3 Progress  Met  -MW        Long Term Goals    LTG Date to Achieve  12/27/18  -MW     LTG 1  pt will improve QuickDASH by 10%  -MW     LTG 1 Progress  Ongoing  -MW     LTG 2  Standing R shoulder flexion AROM to be >/= 100   -MW     LTG 2 Progress  Ongoing  -MW     LTG 3  Standing R shoulder abduction AROM to be >/= 75 degrees   -MW     LTG 3 Progress  Ongoing  -MW        Time Calculation    PT Goal Re-Cert Due Date  12/18/18  -MW       User Key  (r) = Recorded By, (t) = Taken By, (c) = Cosigned By    Initials Name Provider Type    Kelsy Ayala, PT Physical Therapist          PT Assessment/Plan     Row Name 11/29/18 1800          PT Assessment    Functional Limitations  Decreased safety during functional activities;Limitation in home management;Limitations in community activities;Performance in self-care ADL  -MW     Impairments  Joint mobility;Muscle strength;Pain;Posture;Range of motion  -MW     Assessment Comments  The pt tolerated tx well today. She has made significant AROM and PROM improvements since eval. She continues to have soft-tissue restrictions and ROM deficits that attribute to her overall decreased functional ability. She has postural and mobility deficits that warrant continued therapy services at this  time. Will progress as tolerated.  Pt left tx early today secondary to her 4 yo granddaughter needing restroom assistance.  -MW     Rehab Potential  Fair  -MW     Patient/caregiver participated in establishment of treatment plan and goals  Yes  -MW     Patient would benefit from skilled therapy intervention  Yes  -MW        PT Plan    PT Frequency  2x/week  -MW     Predicted Duration of Therapy Intervention (Therapy Eval)  6-8 weeks  -MW     PT Plan Comments  Cont w/ manual. Progress ROM/strength as tolerated.  -MW       User Key  (r) = Recorded By, (t) = Taken By, (c) = Cosigned By    Initials Name Provider Type    Kelsy Ayala, PT Physical Therapist            Exercises     Row Name 11/29/18 1500             Subjective Comments    Subjective Comments  Pt states she feels like her stiffness has worsened over the last couple weeks. She states the cold weather has impacted this. She consents to reassess and tx.   -MW         Subjective Pain    Able to rate subjective pain?  yes  -MW      Pre-Treatment Pain Level  5  -MW         Exercise 1    Exercise Name 1  PROM shoulder (all planes)  -MW      Time 1  17'  -MW         Exercise 2    Exercise Name 2  AROM shoulder  -MW      Time 2  2'  -MW        User Key  (r) = Recorded By, (t) = Taken By, (c) = Cosigned By    Initials Name Provider Type    Kelsy Ayala, PT Physical Therapist           Manual Rx (last 36 hours)      Manual Treatments     Row Name 11/29/18 1700             Manual Rx 1    Manual Rx 1 Location  latissimus/teres minor/major  -MW      Manual Rx 1 Type  MFR  -MW      Manual Rx 1 Duration  10'  -MW        User Key  (r) = Recorded By, (t) = Taken By, (c) = Cosigned By    Initials Name Provider Type    Kelsy Ayala, PT Physical Therapist                      Quick DASH  Open a tight or new jar.: Unable  Do heavy household chores (e.g., wash walls, wash floors): Unable  Carry a shopping bag or briefcase: Severe Difficulty  Wash your back:  Unable  Use a knife to cut food: Severe Difficulty  Recreational activities in which you take some force or impact through your arm, should or hand (e.g. golf, hammering, tennis, etc.): Unable  During the past week, to what extent has your arm, shoulder, or hand problem interfered with your normal social activites with family, friends, neighbors or groups?: Quite a bit  During the past week, were you limited in your work or other regular daily activities as a result of your arm, shoulder or hand problem?: Very limited  Arm, Shoulder, or hand pain: Moderate  Tingling (pins and needles) in your arm, shoulder, or hand: Moderate  During the past week, how much difficulty have you had sleeping because of the pain in your arm, shoulder or hand?: Mild Difficulty  Number of Questions Answered: 11  Quick DASH Score: 75         Time Calculation:         Start Time: 1551  Stop Time: 1620  Time Calculation (min): 29 min  Total Timed Code Minutes- PT: 29 minute(s)     Therapy Charges for Today     Code Description Service Date Service Provider Modifiers Qty    20100837782 HC PT MANUAL THERAPY EA 15 MIN 11/29/2018 Kelsy Hansen, PT GP 1    71048212392 HC PT THER PROC EA 15 MIN 11/29/2018 Kelsy Hansen, PT GP 1                   Kelsy Hansen PT  11/29/2018

## 2018-12-03 ENCOUNTER — HOSPITAL ENCOUNTER (OUTPATIENT)
Dept: PHYSICAL THERAPY | Facility: HOSPITAL | Age: 43
Setting detail: THERAPIES SERIES
Discharge: HOME | End: 2018-12-03

## 2018-12-03 DIAGNOSIS — M54.6 THORACIC SPINE PAIN: ICD-10-CM

## 2018-12-03 DIAGNOSIS — M25.511 CHRONIC RIGHT SHOULDER PAIN: Primary | ICD-10-CM

## 2018-12-03 DIAGNOSIS — G89.29 CHRONIC RIGHT SHOULDER PAIN: Primary | ICD-10-CM

## 2018-12-03 PROCEDURE — 97140 MANUAL THERAPY 1/> REGIONS: CPT

## 2018-12-03 PROCEDURE — 97110 THERAPEUTIC EXERCISES: CPT

## 2018-12-03 NOTE — THERAPY TREATMENT NOTE
Outpatient Physical Therapy Ortho Treatment Note  [facility]     Patient Name: Christal Holder  : 1975  MRN: [id number]  Today's Date: 12/3/2018      Visit Date: 2018   Attendance:  (medical necessity)  Subjective Improvement: unsure  Next MD Appt: EMELINA  Recert Date: 18        Visit Dx:    ICD-10-CM ICD-9-CM   1. Chronic right shoulder pain M25.511 719.41    G89.29 338.29   2. Thoracic spine pain M54.6 724.1       Patient Active Problem List   Diagnosis   • Suicide ideation   • Severe episode of recurrent major depressive disorder, without psychotic features (CMS/HCC)        Past Medical History:   Diagnosis Date   • Anxiety    • Atelectasis    • Chronic pain disorder    • Compression fracture of body of thoracic vertebra (CMS/HCC)     T6-T12   • COPD (chronic obstructive pulmonary disease) (CMS/HCC)     due to trauma   • Depression    • Diabetes mellitus (CMS/HCC)    • Glenoid fracture of shoulder    • Pneumothorax     due to rib fractures   • Rib fracture    • Scapular fracture         Past Surgical History:   Procedure Laterality Date   • CHOLECYSTECTOMY     • GLENOID OPEN REDUCTION INTERNAL FIXATION Right    • SCAPULA OPEN REDUCTION INTERNAL FIXATION     • TUBAL ABDOMINAL LIGATION         PT Ortho     Row Name 18 1000       Posture/Observations    Posture/Observations Comments  Pt has TTP and tightness noted throughout her R shoulder scap stabilizers.   -MW       Right Upper Ext    Rt Shoulder Abduction PROM  170  -MW    Rt Shoulder Flexion PROM  165  -MW      User Key  (r) = Recorded By, (t) = Taken By, (c) = Cosigned By    Initials Name Provider Type    Kelsy Ayala PT Physical Therapist                      PT Assessment/Plan     Row Name 18 1100          PT Assessment    Functional Limitations  Decreased safety during functional activities;Limitation in home management;Limitations in community activities;Performance in self-care ADL  -MW     Impairments   Joint mobility;Muscle strength;Pain;Posture;Range of motion  -MW     Assessment Comments  The pt had noted tightness w/ attempt of PROM at start of session. Manual tx applied accordingly and pt had improved tolerance to PROM and improved ROM measures following manual intervention. Will cont to progress as tolerated.   -MW     Rehab Potential  Fair  -MW     Patient/caregiver participated in establishment of treatment plan and goals  Yes  -MW     Patient would benefit from skilled therapy intervention  Yes  -MW        PT Plan    PT Frequency  2x/week  -MW     Predicted Duration of Therapy Intervention (Therapy Eval)  6-8 weeks  -MW     PT Plan Comments  Introduce more ther ex if pt appropriate next visit. Cont w/ manual PRN.  -MW       User Key  (r) = Recorded By, (t) = Taken By, (c) = Cosigned By    Initials Name Provider Type    Kelsy Ayala PT Physical Therapist              Exercises     Row Name 12/03/18 1000             Subjective Comments    Subjective Comments  The pt states she is feeling a little better today. She states her pain is not bad. She consents to tx.  -MW         Subjective Pain    Able to rate subjective pain?  yes  -MW      Pre-Treatment Pain Level  3  -MW      Post-Treatment Pain Level  3  -MW         Exercise 1    Exercise Name 1  PROM shoulder (all planes)  -MW      Time 1  10'  -MW         Exercise 2    Exercise Name 2  PROM scapula(pro/re)  -MW      Time 2  6'  -MW        User Key  (r) = Recorded By, (t) = Taken By, (c) = Cosigned By    Initials Name Provider Type    Kelsy Ayala, PT Physical Therapist                        Manual Rx (last 36 hours)      Manual Treatments     Row Name 12/03/18 1100             Manual Rx 1    Manual Rx 1 Location  Scap stabilizers/UT/parapsinals  -      Manual Rx 1 Type  MFR for increased tissue extensibility  -MW      Manual Rx 1 Grade  cross hands and unwinding technique used  -      Manual Rx 1 Duration  20'  -MW         Manual Rx 2     Manual Rx 2 Location  c-spine/upper t-spine  -MW      Manual Rx 2 Type  mobs: BERNICE gtz, distraction  -MW      Manual Rx 2 Duration  4'  -MW        User Key  (r) = Recorded By, (t) = Taken By, (c) = Cosigned By    Initials Name Provider Type    Kelsy Ayala, PT Physical Therapist          PT OP Goals     Row Name 12/03/18 1156 12/03/18 1000       PT Short Term Goals    STG Date to Achieve  12/18/18  -MW  --  -MW    STG 1  pt will be independent w/ HEP  -MW  --  -MW    STG 1 Progress  Ongoing  -MW  --  -MW    STG 2  pt will report pain <4/10  -MW  --  -MW    STG 2 Progress  Met  -MW  --  -MW    STG 3  pt will improve AROM shoulder flex and abd by 10 deg  -MW  --  -MW    STG 3 Progress  Met  -MW  --  -MW       Long Term Goals    LTG Date to Achieve  12/27/18  -MW  --  -MW    LTG 1  pt will improve QuickDASH by 10%  -MW  --  -MW    LTG 1 Progress  Ongoing  -MW  --  -MW    LTG 2  Standing R shoulder flexion AROM to be >/= 100   -MW  --  -MW    LTG 2 Progress  Ongoing  -MW  --  -MW    LTG 3  Standing R shoulder abduction AROM to be >/= 75 degrees   -MW  --  -MW    LTG 3 Progress  Ongoing  -MW  --  -MW       Time Calculation    PT Goal Re-Cert Due Date  12/18/18  -MW  --      User Key  (r) = Recorded By, (t) = Taken By, (c) = Cosigned By    Initials Name Provider Type    Kelsy Ayala, PT Physical Therapist                         Time Calculation:   Start Time: 1018  Stop Time: 1058  Time Calculation (min): 40 min  Total Timed Code Minutes- PT: 40 minute(s)    Therapy Charges for Today     Code Description Service Date Service Provider Modifiers Qty    23112074185 HC PT MANUAL THERAPY EA 15 MIN 12/3/2018 Kelsy Hansen, PT GP 2    22685221269 HC PT THER PROC EA 15 MIN 12/3/2018 Kelsy Hansen, PT GP 1                    Kelsy Hansen PT  12/3/2018

## 2018-12-05 ENCOUNTER — HOSPITAL ENCOUNTER (OUTPATIENT)
Dept: PHYSICAL THERAPY | Facility: HOSPITAL | Age: 43
Setting detail: THERAPIES SERIES
Discharge: HOME | End: 2018-12-05

## 2018-12-05 DIAGNOSIS — M75.01 ADHESIVE CAPSULITIS OF RIGHT SHOULDER: ICD-10-CM

## 2018-12-05 DIAGNOSIS — G89.29 CHRONIC RIGHT SHOULDER PAIN: Primary | ICD-10-CM

## 2018-12-05 DIAGNOSIS — M25.511 CHRONIC RIGHT SHOULDER PAIN: Primary | ICD-10-CM

## 2018-12-05 DIAGNOSIS — M54.6 THORACIC SPINE PAIN: ICD-10-CM

## 2018-12-05 PROCEDURE — 97110 THERAPEUTIC EXERCISES: CPT | Performed by: PHYSICAL THERAPIST

## 2018-12-05 NOTE — THERAPY TREATMENT NOTE
Outpatient Physical Therapy Ortho Treatment Note  [facility]     Patient Name: Christal Holder  : 1975  MRN: [id number]  Today's Date: 2018      Visit Date: 2018  Attendance: 10/10 (medical necessity)  Subjective Improvement: unsure  Next MD Appt: EMELINA  Recert Date: 18      Visit Dx:    ICD-10-CM ICD-9-CM   1. Chronic right shoulder pain M25.511 719.41    G89.29 338.29   2. Thoracic spine pain M54.6 724.1   3. Adhesive capsulitis of right shoulder M75.01 726.0            Past Medical History:   Diagnosis Date   • Anxiety    • Atelectasis    • Chronic pain disorder    • Compression fracture of body of thoracic vertebra (CMS/HCC)     T6-T12   • COPD (chronic obstructive pulmonary disease) (CMS/HCC)     due to trauma   • Depression    • Diabetes mellitus (CMS/HCC)    • Glenoid fracture of shoulder    • Pneumothorax     due to rib fractures   • Rib fracture    • Scapular fracture         Past Surgical History:   Procedure Laterality Date   • CHOLECYSTECTOMY     • GLENOID OPEN REDUCTION INTERNAL FIXATION Right    • SCAPULA OPEN REDUCTION INTERNAL FIXATION     • TUBAL ABDOMINAL LIGATION         PT Ortho     Row Name 18 1100       Posture/Observations    Posture/Observations Comments  Forward head, rounded shoulder posture. Hypertonic R UT.  -SS      User Key  (r) = Recorded By, (t) = Taken By, (c) = Cosigned By    Initials Name Provider Type    SS Alfnozo Maxwell, PT DPT Physical Therapist                      PT Assessment/Plan     Row Name 18 1100          Functional Limitations  Decreased safety during functional activities;Limitation in home management;Limitations in community activities;Performance in self-care ADL  -SS    Impairments  Joint mobility;Muscle strength;Pain;Posture;Range of motion  -SS    Assessment Comments  Muscle fatigue and soreness noted with initiation of more focused strengthening program.   -SS    Rehab Potential  Fair  -SS     Patient/caregiver participated in establishment of treatment plan and goals  Yes  -SS    Patient would benefit from skilled therapy intervention  Yes  -SS          PT Frequency  2x/week  -SS    Predicted Duration of Therapy Intervention (Therapy Eval)  6-8 weeks  -SS    PT Plan Comments  Continue POC. Progressive strengthening program as tolerated. Resisted scapular depression next.  -SS      User Key  (r) = Recorded By, (t) = Taken By, (c) = Cosigned By    Initials Name Provider Type    SS Alfonzo Maxwell, PT DPT Physical Therapist              Exercises     Row Name 12/05/18 1100          Subjective Comments  Has been using heating pad most of this morning. Pain not too bad.   -SS          Able to rate subjective pain?  yes  -SS    Pre-Treatment Pain Level  3  -SS    Post-Treatment Pain Level  3  -SS          Exercise Name 1  DB shoulder flexion - Supine  -SS    Cueing 1  Verbal  -SS    Sets 1  3  -SS    Reps 1  5  -SS    Additional Comments  1# DB  -SS          Exercise Name 2  SLER  -SS    Cueing 2  Verbal  -SS    Sets 2  2  -SS    Reps 2  5  -SS    Additional Comments  1# DB  -SS          Exercise Name 3  Sidelying shoulder abduction  -SS    Cueing 3  Verbal  -SS    Sets 3  2  -SS    Reps 3  5  -SS    Additional Comments  arm weight  -SS          Exercise Name 4  Biceps curls  -SS    Cueing 4  Verbal;Demo  -SS    Reps 4  10  -SS    Additional Comments  1# DB  -SS          Exercise Name 5  T-band scapular retraction  -SS    Cueing 5  Verbal;Demo  -SS    Sets 5  2  -SS    Reps 5  10  -SS    Additional Comments  yellow t-band  -SS          Exercise Name 6  Scapular depression  -SS    Cueing 6  Verbal;Tactile  -SS    Sets 6  3  -SS    Reps 6  10  -SS      User Key  (r) = Recorded By, (t) = Taken By, (c) = Cosigned By    Initials Name Provider Type     Alfonzo Maxwell, PT DPT Physical Therapist                         PT OP Goals     Row Name 12/05/18 1100          STG Date to Achieve  12/18/18  -SS     STG 1  pt will be independent w/ HEP  -SS    STG 1 Progress  Ongoing  -SS    STG 2  pt will report pain <4/10  -    STG 2 Progress  Met  -SS    STG 3  pt will improve AROM shoulder flex and abd by 10 deg  -    STG 3 Progress  Met  -SS          LTG Date to Achieve  12/27/18  -    LTG 1  pt will improve QuickDASH by 10%  -    LTG 1 Progress  Ongoing  -SS    LTG 2  Standing R shoulder flexion AROM to be >/= 100   -    LTG 2 Progress  Ongoing  -SS    LTG 3  Standing R shoulder abduction AROM to be >/= 75 degrees   -    LTG 3 Progress  Ongoing  -SS          PT Goal Re-Cert Due Date  12/18/18  -      User Key  (r) = Recorded By, (t) = Taken By, (c) = Cosigned By    Initials Name Provider Type    Alfonzo Pat, PT DPT Physical Therapist          Therapy Education  Education Details: T-band scapular retraction, biceps curls  Given: HEP  Program: New  How Provided: Verbal, Demonstration  Provided to: Patient  Level of Understanding: Verbalized, Demonstrated              Time Calculation:   Start Time: 1107  Stop Time: 1135  Time Calculation (min): 28 min    Therapy Charges for Today     Code Description Service Date Service Provider Modifiers Qty    66162679778 HC PT THER PROC EA 15 MIN 12/5/2018 Alfonzo Maxwell, PT DPT GP 2                    Alfonzo Maxwell, PT, DPT, CHT  12/5/2018

## 2018-12-10 ENCOUNTER — HOSPITAL ENCOUNTER (OUTPATIENT)
Dept: PHYSICAL THERAPY | Facility: HOSPITAL | Age: 43
Setting detail: THERAPIES SERIES
Discharge: HOME | End: 2018-12-10

## 2018-12-10 DIAGNOSIS — M54.6 THORACIC SPINE PAIN: ICD-10-CM

## 2018-12-10 DIAGNOSIS — G89.29 CHRONIC RIGHT SHOULDER PAIN: Primary | ICD-10-CM

## 2018-12-10 DIAGNOSIS — M25.511 CHRONIC RIGHT SHOULDER PAIN: Primary | ICD-10-CM

## 2018-12-10 PROCEDURE — 97110 THERAPEUTIC EXERCISES: CPT | Performed by: PHYSICAL THERAPIST

## 2018-12-10 NOTE — THERAPY TREATMENT NOTE
Outpatient Physical Therapy Ortho Treatment Note  [facility]     Patient Name: Christal Holder  : 1975  MRN: [id number]  Today's Date: 12/10/2018      Visit Date: 12/10/2018  Attendance:  (medical necessity)  Subjective Improvement: 25%  Next MD Appt: EMELINA  Recert Date: 18       Visit Dx:    ICD-10-CM ICD-9-CM   1. Chronic right shoulder pain M25.511 719.41    G89.29 338.29   2. Thoracic spine pain M54.6 724.1            Past Medical History:   Diagnosis Date   • Anxiety    • Atelectasis    • Chronic pain disorder    • Compression fracture of body of thoracic vertebra (CMS/HCC)     T6-T12   • COPD (chronic obstructive pulmonary disease) (CMS/HCC)     due to trauma   • Depression    • Diabetes mellitus (CMS/HCC)    • Glenoid fracture of shoulder    • Pneumothorax     due to rib fractures   • Rib fracture    • Scapular fracture         Past Surgical History:   Procedure Laterality Date   • CHOLECYSTECTOMY     • GLENOID OPEN REDUCTION INTERNAL FIXATION Right    • SCAPULA OPEN REDUCTION INTERNAL FIXATION     • TUBAL ABDOMINAL LIGATION         PT Ortho     Row Name 12/10/18 1100       Posture/Observations    Posture/Observations Comments  Forward head, rounded shoulder posture. Hypertonic R UT.  -      User Key  (r) = Recorded By, (t) = Taken By, (c) = Cosigned By    Initials Name Provider Type     Alfonzo Maxwell, PT DPT Physical Therapist                      PT Assessment/Plan     Row Name 12/10/18 1100          Functional Limitations  Decreased safety during functional activities;Limitation in home management;Limitations in community activities;Performance in self-care ADL  -SS    Impairments  Joint mobility;Muscle strength;Pain;Posture;Range of motion  -    Assessment Comments  Muscle fatigue noted with supine flexion, sidelying abduction, and SLER exercises. Did well with theraband exercises. Patient was dispensed Thera-loop for use in HEP.  -    Rehab Potential  Fair   "-SS    Patient/caregiver participated in establishment of treatment plan and goals  Yes  -SS    Patient would benefit from skilled therapy intervention  Yes  -SS          PT Frequency  2x/week  -SS    Predicted Duration of Therapy Intervention (Therapy Eval)  6-8 weeks  -SS    PT Plan Comments  Recheck scheduled 12/19/18. Progress strengthening as tolerated.  -SS      User Key  (r) = Recorded By, (t) = Taken By, (c) = Cosigned By    Initials Name Provider Type    SS Alfonzo Maxwell, PT DPT Physical Therapist              Exercises     Row Name 12/10/18 1100          Subjective Comments  \"A little sore\" after last therapy session. Has been working on HEP but not pushing it. 25% subjective improvement.  -SS          Able to rate subjective pain?  yes  -SS    Pre-Treatment Pain Level  4  -SS    Post-Treatment Pain Level  4  -SS    Subjective Pain Comment  patient declines modalities this date.   -SS          Exercise Name 1  DB shoulder flexion - supine  -SS    Cueing 1  Verbal  -SS    Sets 1  3  -SS    Reps 1  10  -SS    Additional Comments  1# DB  -SS          Exercise Name 2  SLER  -SS    Cueing 2  Verbal  -SS    Sets 2  3  -SS    Reps 2  5  -SS    Additional Comments  1# DB  -SS          Exercise Name 3  Sidelying shoulder abduction  -SS    Cueing 3  Verbal  -SS    Sets 3  3  -SS    Reps 3  5  -SS    Additional Comments  arm weight  -SS          Exercise Name 4  T-band scapular depression  -SS    Cueing 4  Verbal;Demo  -SS    Sets 4  3  -SS    Reps 4  10  -SS    Additional Comments  Red T-band  -SS          Exercise Name 5  T-band triceps extension  -SS    Cueing 5  Verbal;Demo  -SS    Sets 5  3  -SS    Reps 5  10  -SS    Additional Comments  red t-band  -SS          Exercise Name 6  T-band rickshaw  -SS    Cueing 6  Verbal;Demo  -SS    Sets 6  3  -SS    Reps 6  10  -SS    Additional Comments  red t-band  -SS          Exercise Name 7  Wall mini pushout with hands at elbow height  -SS    Cueing 7  Verbal;Demo "  -SS    Sets 7  2  -SS    Reps 7  5  -SS          Exercise Name 8  UE WB lateral shifts with hands at chest height  -SS    Cueing 8  Verbal;Demo  -SS    Sets 8  2  -SS    Reps 8  10  -SS          Exercise Name 9  Isometric B shoulder extension and scap retraction into wall  -SS    Cueing 9  Verbal;Demo  -SS    Reps 9  15  -SS    Time 9  5 sec hold  -SS      User Key  (r) = Recorded By, (t) = Taken By, (c) = Cosigned By    Initials Name Provider Type     Alfonzo Maxwell, PT DPT Physical Therapist                         PT OP Goals     Row Name 12/10/18 1100          STG Date to Achieve  12/18/18  -SS    STG 1  pt will be independent w/ HEP  -SS    STG 1 Progress  Ongoing  -SS    STG 2  pt will report pain <4/10  -SS    STG 2 Progress  Met  -SS    STG 3  pt will improve AROM shoulder flex and abd by 10 deg  -    STG 3 Progress  Met  -SS          LTG Date to Achieve  12/27/18  -SS    LTG 1  pt will improve QuickDASH by 10%  -    LTG 1 Progress  Ongoing  -SS    LTG 2  Standing R shoulder flexion AROM to be >/= 100   -    LTG 2 Progress  Ongoing  -SS    LTG 3  Standing R shoulder abduction AROM to be >/= 75 degrees   -    LTG 3 Progress  Ongoing  -SS          PT Goal Re-Cert Due Date  12/18/18  -      User Key  (r) = Recorded By, (t) = Taken By, (c) = Cosigned By    Initials Name Provider Type     Alfonzo Maxwell, PT DPT Physical Therapist          Therapy Education  Education Details: t-band rickshaw, t-band triceps extension  Given: HEP  Program: Progressed  How Provided: Verbal, Demonstration  Provided to: Patient  Level of Understanding: Verbalized, Demonstrated              Time Calculation:   Start Time: 1101  Stop Time: 1136  Time Calculation (min): 35 min    Therapy Charges for Today     Code Description Service Date Service Provider Modifiers Qty    40024895011 HC PT THER PROC EA 15 MIN 12/10/2018 Alfonzo Maxwell, PT DPT GP 2                    Alfonzo Maxwell, PT, DPT,  CHT  12/10/2018

## 2018-12-12 ENCOUNTER — HOSPITAL ENCOUNTER (OUTPATIENT)
Dept: PHYSICAL THERAPY | Facility: HOSPITAL | Age: 43
Setting detail: THERAPIES SERIES
Discharge: HOME | End: 2018-12-12

## 2018-12-12 DIAGNOSIS — M25.511 CHRONIC RIGHT SHOULDER PAIN: Primary | ICD-10-CM

## 2018-12-12 DIAGNOSIS — M54.6 THORACIC SPINE PAIN: ICD-10-CM

## 2018-12-12 DIAGNOSIS — G89.29 CHRONIC RIGHT SHOULDER PAIN: Primary | ICD-10-CM

## 2018-12-12 PROCEDURE — 97140 MANUAL THERAPY 1/> REGIONS: CPT

## 2018-12-12 PROCEDURE — 97110 THERAPEUTIC EXERCISES: CPT

## 2018-12-12 NOTE — THERAPY TREATMENT NOTE
Outpatient Physical Therapy Ortho Treatment Note  [facility]     Patient Name: Christal Holder  : 1975  MRN: [id number]  Today's Date: 2018      Visit Date: 2018  Attendance:  (medical necessity)  Subjective Improvement: 25%  Next MD Appt: EMELINA  Recert Date: 18         Visit Dx:    ICD-10-CM ICD-9-CM   1. Chronic right shoulder pain M25.511 719.41    G89.29 338.29   2. Thoracic spine pain M54.6 724.1       Patient Active Problem List   Diagnosis   • Suicide ideation   • Severe episode of recurrent major depressive disorder, without psychotic features (CMS/HCC)        Past Medical History:   Diagnosis Date   • Anxiety    • Atelectasis    • Chronic pain disorder    • Compression fracture of body of thoracic vertebra (CMS/HCC)     T6-T12   • COPD (chronic obstructive pulmonary disease) (CMS/HCC)     due to trauma   • Depression    • Diabetes mellitus (CMS/HCC)    • Glenoid fracture of shoulder    • Pneumothorax     due to rib fractures   • Rib fracture    • Scapular fracture         Past Surgical History:   Procedure Laterality Date   • CHOLECYSTECTOMY     • GLENOID OPEN REDUCTION INTERNAL FIXATION Right    • SCAPULA OPEN REDUCTION INTERNAL FIXATION     • TUBAL ABDOMINAL LIGATION         PT Ortho     Row Name 18 1100       Posture/Observations    Posture/Observations Comments  Severe TTP and guarding throughout thoracic parsapinals and R teres minor/major.  -MW    Row Name 12/10/18 1100       Posture/Observations    Posture/Observations Comments  Forward head, rounded shoulder posture. Hypertonic R UT.  -SS      User Key  (r) = Recorded By, (t) = Taken By, (c) = Cosigned By    Initials Name Provider Type    Alfonzo Pat, PT DPT Physical Therapist    Kelsy Ayala, PT Physical Therapist                      PT Assessment/Plan     Row Name 18 1100          PT Assessment    Functional Limitations  Decreased safety during functional  activities;Limitation in home management;Limitations in community activities;Performance in self-care ADL  -MW     Impairments  Joint mobility;Muscle strength;Pain;Posture;Range of motion  -MW     Assessment Comments  The pt tolerated tx well today. She had severe TTP and guarding present surrounding her post R shoulder that can likely be related to her fall yesterday. Manual tx applied accordingly and the pt responded well to manual tx. Ther ex kept light secondary to sxs. Will cont to progress as tolerated.   -MW     Rehab Potential  Fair  -MW     Patient/caregiver participated in establishment of treatment plan and goals  Yes  -MW     Patient would benefit from skilled therapy intervention  Yes  -MW        PT Plan    PT Frequency  2x/week  -MW     Predicted Duration of Therapy Intervention (Therapy Eval)  6-8 weeks  -MW     PT Plan Comments  Progress strengthening and ROM as tolerated. Cont w/ manual PRN.  -MW       User Key  (r) = Recorded By, (t) = Taken By, (c) = Cosigned By    Initials Name Provider Type    MW Kelsy Hansen, PT Physical Therapist              Exercises     Row Name 12/12/18 1100             Subjective Comments    Subjective Comments  The pt states she feels a little painful today. She states this is partially due to tripping yesterday and catching herself on her couch w/ her bad arm. She states she is very sore from this and thinks she may have pulled something. She consents to tx.   -MW         Subjective Pain    Able to rate subjective pain?  yes  -MW      Pre-Treatment Pain Level  5  -MW      Post-Treatment Pain Level  4  -MW         Exercise 1    Exercise Name 1  PROM shoulder (all planes)  -MW      Time 1  10'  -MW         Exercise 2    Exercise Name 2  thoracic paraspinal stretch--- TRUE bar used  -MW      Sets 2  4  -MW      Time 2  max hold  -MW        User Key  (r) = Recorded By, (t) = Taken By, (c) = Cosigned By    Initials Name Provider Type    Kelsy Ayala, PT Physical  Therapist                        Manual Rx (last 36 hours)      Manual Treatments     Row Name 12/12/18 1100             Manual Rx 1    Manual Rx 1 Location  Scap stabilizers/teres bundle/parapsinals  -MW      Manual Rx 1 Type  MFR for increased tissue extensibility  -MW      Manual Rx 1 Grade  cross hands and unwinding technique used  -MW      Manual Rx 1 Duration  25'  -MW        User Key  (r) = Recorded By, (t) = Taken By, (c) = Cosigned By    Initials Name Provider Type    MW Kelsy Hansen, PT Physical Therapist          PT OP Goals     Row Name 12/12/18 1156 12/12/18 1100       PT Short Term Goals    STG Date to Achieve  --  12/18/18  -MW    STG 1  --  pt will be independent w/ HEP  -MW    STG 1 Progress  --  Ongoing  -MW    STG 2  --  pt will report pain <4/10  -MW    STG 2 Progress  --  Met  -MW    STG 3  --  pt will improve AROM shoulder flex and abd by 10 deg  -MW    STG 3 Progress  --  Met  -MW       Long Term Goals    LTG Date to Achieve  --  12/27/18  -MW    LTG 1  --  pt will improve QuickDASH by 10%  -MW    LTG 1 Progress  --  Ongoing  -MW    LTG 2  --  Standing R shoulder flexion AROM to be >/= 100   -MW    LTG 2 Progress  --  Ongoing  -MW    LTG 3  --  Standing R shoulder abduction AROM to be >/= 75 degrees   -MW    LTG 3 Progress  --  Ongoing  -MW       Time Calculation    PT Goal Re-Cert Due Date  12/18/18  -MW  --      User Key  (r) = Recorded By, (t) = Taken By, (c) = Cosigned By    Initials Name Provider Type    MW Kelsy Hansen, PT Physical Therapist                         Time Calculation:   Start Time: 1106  Stop Time: 1155  Time Calculation (min): 49 min  PT Non-Billable Time (min): 4 min  Total Timed Code Minutes- PT: 45 minute(s)    Therapy Charges for Today     Code Description Service Date Service Provider Modifiers Qty    54183013199 HC PT MANUAL THERAPY EA 15 MIN 12/12/2018 Kelsy Hansen, PT GP 2    04998578699 HC PT THER PROC EA 15 MIN 12/12/2018 Kelsy Hansen, PT GP 1                     Kelsy Hansen, PT  12/12/2018

## 2018-12-17 ENCOUNTER — HOSPITAL ENCOUNTER (OUTPATIENT)
Dept: PHYSICAL THERAPY | Facility: HOSPITAL | Age: 43
Setting detail: THERAPIES SERIES
Discharge: HOME | End: 2018-12-17

## 2018-12-17 DIAGNOSIS — M54.6 THORACIC SPINE PAIN: ICD-10-CM

## 2018-12-17 DIAGNOSIS — M25.511 CHRONIC RIGHT SHOULDER PAIN: Primary | ICD-10-CM

## 2018-12-17 DIAGNOSIS — G89.29 CHRONIC RIGHT SHOULDER PAIN: Primary | ICD-10-CM

## 2018-12-17 PROCEDURE — 97110 THERAPEUTIC EXERCISES: CPT | Performed by: PHYSICAL THERAPIST

## 2018-12-18 NOTE — THERAPY TREATMENT NOTE
Outpatient Physical Therapy Ortho Treatment Note  [facility]     Patient Name: Christal Holder  : 1975  MRN: [id number]  Today's Date: 2018      Visit Date: 2018  Attendance:  (medical necessity)  Subjective Improvement: 25%  Next MD Appt: EMELINA  Recert Date: 18      Visit Dx:    ICD-10-CM ICD-9-CM   1. Chronic right shoulder pain M25.511 719.41    G89.29 338.29   2. Thoracic spine pain M54.6 724.1       Patient Active Problem List   Diagnosis   • Suicide ideation   • Severe episode of recurrent major depressive disorder, without psychotic features (CMS/HCC)        Past Medical History:   Diagnosis Date   • Anxiety    • Atelectasis    • Chronic pain disorder    • Compression fracture of body of thoracic vertebra (CMS/HCC)     T6-T12   • COPD (chronic obstructive pulmonary disease) (CMS/HCC)     due to trauma   • Depression    • Diabetes mellitus (CMS/HCC)    • Glenoid fracture of shoulder    • Pneumothorax     due to rib fractures   • Rib fracture    • Scapular fracture         Past Surgical History:   Procedure Laterality Date   • CHOLECYSTECTOMY     • GLENOID OPEN REDUCTION INTERNAL FIXATION Right    • SCAPULA OPEN REDUCTION INTERNAL FIXATION     • TUBAL ABDOMINAL LIGATION         PT Ortho     Row Name 18 1300       Posture/Observations    Posture/Observations Comments  Forward head, rounded shoulder posture. Hypertonic R UT.  -SS       Right Upper Ext    Rt Shoulder Abduction AROM  85  -SS    Rt Shoulder Flexion AROM  115  -SS    Rt Shoulder External Rotation AROM  28 seated neutral; 48 deg in supine 90/90 position  -SS        Strength Right    # Reps  1  -SS    Right Rung  2  -SS    Right  Test 1  20  -SS     Strength Average Right  20  -SS        Strength Left    # Reps  1  -SS    Left Rung  2  -SS    Left  Test 1  65  -SS     Strength Average Left  65  -SS       Hand  Strength     Strength Affected Side  Bilateral  -SS      User  "Key  (r) = Recorded By, (t) = Taken By, (c) = Cosigned By    Initials Name Provider Type     Alfonzo Maxwell, PT DPT Physical Therapist                      PT Assessment/Plan     Row Name 12/17/18 1300          Functional Limitations  Decreased safety during functional activities;Limitation in home management;Limitations in community activities;Performance in self-care ADL  -SS    Impairments  Joint mobility;Muscle strength;Pain;Posture;Range of motion  -    Assessment Comments  Patient was dispensed red therapy putty for use in HEP. Soreness in shoulder and neck with PNF UE lift/chop.  -SS    Rehab Potential  Fair  -SS    Patient/caregiver participated in establishment of treatment plan and goals  Yes  -SS    Patient would benefit from skilled therapy intervention  Yes  -SS          PT Frequency  2x/week  -SS    Predicted Duration of Therapy Intervention (Therapy Eval)  6-8 weeks  -SS    PT Plan Comments  Recheck scheduled 12/19/18. Progress strength and motion as tolerated.   -      User Key  (r) = Recorded By, (t) = Taken By, (c) = Cosigned By    Initials Name Provider Type     Alfonzo Maxwell, PT DPT Physical Therapist              Exercises     Row Name 12/17/18 1300          Subjective Comments  Pain \"a little bit in my back, but I can also feel it in my neck and my shoulder.\" Feels tight. DB exercises makes her arm sore.   -SS          Able to rate subjective pain?  yes  -SS    Pre-Treatment Pain Level  4  -SS    Post-Treatment Pain Level  4  -SS          Exercise Name 1  DB shoulder flexion with table inclined 20 deg  -SS    Cueing 1  Verbal  -SS    Sets 1  3  -SS    Reps 1  10  -SS    Additional Comments  1# DB   -SS          Exercise Name 2  SLER  -SS    Cueing 2  Verbal  -SS    Sets 2  2  -SS    Reps 2  7  -SS    Additional Comments  1# DB  -SS          Exercise Name 3  Sidelying shoulder abduction  -SS    Cueing 3  Verbal  -SS    Reps 3  8  -SS    Additional Comments  arm weight  " -SS          Exercise Name 4  PNF R UE push/pull  -SS    Cueing 4  Verbal;Tactile  -SS    Sets 4  2  -SS    Reps 4  10  -SS    Additional Comments  manual resistance  -SS          Exercise Name 5  Putty   -SS    Cueing 5  Verbal;Demo  -SS    Time 5  2 mins  -SS    Additional Comments  Red therapy putty  -SS          Exercise Name 6  Reverse corner pushouts  -SS    Cueing 6  Verbal;Demo  -SS    Sets 6  3  -SS    Reps 6  10  -SS          Exercise Name 7  PNF UE lift/chop - B  -SS    Cueing 7  Verbal;Demo  -SS    Sets 7  1  -SS    Reps 7  10 each  -SS          Exercise Name 8  Place and hold shoulder flexion at approximately 100 deg - seated  -SS    Cueing 8  Verbal;Tactile  -SS    Sets 8  1  -SS    Reps 8  5  -SS    Time 8  5 sec hold  -SS          Exercise Name 9  DB biceps curls  -SS    Cueing 9  Verbal;Demo  -SS    Sets 9  3  -SS    Reps 9  10  -SS    Additional Comments  2# DB  -SS      User Key  (r) = Recorded By, (t) = Taken By, (c) = Cosigned By    Initials Name Provider Type     Alfonzo Maxwell, PT DPT Physical Therapist                         PT OP Goals     Row Name 12/17/18 1300          STG Date to Achieve  12/18/18  -SS    STG 1  pt will be independent w/ HEP  -SS    STG 1 Progress  Ongoing  -SS    STG 2  pt will report pain <4/10  -SS    STG 2 Progress  Met  -SS    STG 3  pt will improve AROM shoulder flex and abd by 10 deg  -SS    STG 3 Progress  Met  -SS          LTG Date to Achieve  12/27/18  -SS    LTG 1  pt will improve QuickDASH by 10%  -SS    LTG 1 Progress  Ongoing  -SS    LTG 2  Standing R shoulder flexion AROM to be >/= 100   -SS    LTG 2 Progress  Met  -SS    LTG 3  Standing R shoulder abduction AROM to be >/= 75 degrees   -SS    LTG 3 Progress  Met  -SS          PT Goal Re-Cert Due Date  12/18/18  -      User Key  (r) = Recorded By, (t) = Taken By, (c) = Cosigned By    Initials Name Provider Type    Alfonzo Pat, PT DPT Physical Therapist          Therapy  Education  Education Details: putradha guerrero  Given: HEP  Program: Progressed  How Provided: Verbal  Provided to: Patient  Level of Understanding: Verbalized, Demonstrated              Time Calculation:   Start Time: 1303  Stop Time: 1342  Time Calculation (min): 39 min    Therapy Charges for Today     Code Description Service Date Service Provider Modifiers Qty    12171034203 HC PT THER PROC EA 15 MIN 12/17/2018 Alfonzo Maxwell, PT DPT GP 3                    Alfonzo Maxwell, PT, DPT, CHT  12/17/2018

## 2018-12-19 ENCOUNTER — HOSPITAL ENCOUNTER (OUTPATIENT)
Dept: PHYSICAL THERAPY | Facility: HOSPITAL | Age: 43
Setting detail: THERAPIES SERIES
Discharge: HOME | End: 2018-12-19

## 2018-12-19 DIAGNOSIS — M25.511 CHRONIC RIGHT SHOULDER PAIN: Primary | ICD-10-CM

## 2018-12-19 DIAGNOSIS — G89.29 CHRONIC RIGHT SHOULDER PAIN: Primary | ICD-10-CM

## 2018-12-19 PROCEDURE — 97140 MANUAL THERAPY 1/> REGIONS: CPT | Performed by: PHYSICAL THERAPIST

## 2018-12-20 NOTE — THERAPY PROGRESS REPORT/RE-CERT
Outpatient Physical Therapy Ortho Progress Note  [facility]     Patient Name: Christal Holder  : 1975  MRN: [id number]  Today's Date: 2018      Visit Date: 2018  Attendance:  (medical necessity)  Subjective Improvement: 35-40%  Next MD Appt: EMELINA  Recert Date: 19     Changes in Medications: none noted  Changes in MD Orders: none noted  Number of Work Days Lost: n/a       Past Medical History:   Diagnosis Date   • Anxiety    • Atelectasis    • Chronic pain disorder    • Compression fracture of body of thoracic vertebra (CMS/HCC)     T6-T12   • COPD (chronic obstructive pulmonary disease) (CMS/HCC)     due to trauma   • Depression    • Diabetes mellitus (CMS/HCC)    • Glenoid fracture of shoulder    • Pneumothorax     due to rib fractures   • Rib fracture    • Scapular fracture         Past Surgical History:   Procedure Laterality Date   • CHOLECYSTECTOMY     • GLENOID OPEN REDUCTION INTERNAL FIXATION Right    • SCAPULA OPEN REDUCTION INTERNAL FIXATION     • TUBAL ABDOMINAL LIGATION         Visit Dx:     ICD-10-CM ICD-9-CM   1. Chronic right shoulder pain M25.511 719.41    G89.29 338.29           PT Ortho     Row Name 18 1300       Subjective Comments    Subjective Comments  Patient reports that she has been in more pain since her last therapy session. Pain in upper back, shoulder, cervical, UT, and brachium on R. States that she feels stiff in B shoulders. Trouble finding comfortable position to rest at present. Has been using heat to try to manage pain. Thinks may have overdone her exercises pushing to get a 3rd set in. 35-40% subjective improvement  -SS       Subjective Pain    Able to rate subjective pain?  yes  -SS    Pre-Treatment Pain Level  6  -SS    Post-Treatment Pain Level  4  -SS       Posture/Observations    Posture/Observations Comments  Guarded overall posture.  -SS       Right Upper Ext    Rt Shoulder Abduction AROM  65; anterior brachial pain after MFR:  97  -SS    Rt Shoulder Flexion AROM  75, anterior brachial and scapular pain; 83 supine after MFR: 113 seated, 123 supine  -SS    Rt Shoulder External Rotation AROM  13 seated neutral; superior shoulder/UT pain after MFR: 23 seated neutral  -SS        Strength Right    # Reps  1  -SS    Right Rung  2  -SS    Right  Test 1  30  -SS     Strength Average Right  30  -SS        Strength Left    # Reps  1  -SS    Left Rung  2  -SS    Left  Test 1  70  -SS     Strength Average Left  70  -SS       Hand  Strength     Strength Affected Side  Bilateral  -SS    Row Name 12/17/18 1300       Posture/Observations    Posture/Observations Comments  Forward head, rounded shoulder posture. Hypertonic R UT.  -SS       Right Upper Ext    Rt Shoulder Abduction AROM  85  -SS    Rt Shoulder Flexion AROM  115  -SS    Rt Shoulder External Rotation AROM  28 seated neutral; 48 deg in supine 90/90 position  -SS        Strength Right    # Reps  1  -SS    Right Rung  2  -SS    Right  Test 1  20  -SS     Strength Average Right  20  -SS        Strength Left    # Reps  1  -SS    Left Rung  2  -SS    Left  Test 1  65  -SS     Strength Average Left  65  -SS       Hand  Strength     Strength Affected Side  Bilateral  -SS      User Key  (r) = Recorded By, (t) = Taken By, (c) = Cosigned By    Initials Name Provider Type    Alfonzo Pat, PT DPT Physical Therapist                      Therapy Education  Given: HEP  Program: Reinforced  How Provided: Verbal  Provided to: Patient  Level of Understanding: Verbalized     PT OP Goals     Row Name 12/19/18 1300          STG Date to Achieve  -- further STGs deferred  -    STG 1  pt will be independent w/ HEP  -    STG 1 Progress  Ongoing  -    STG 2  pt will report pain <4/10  -    STG 2 Progress  Met  -    STG 2 Progress Comments  met previously  -    STG 3  pt will improve AROM shoulder flex and abd by 10 deg  -    STG 3  Progress  Met  -SS          LTG Date to Achieve  12/27/18  -    LTG 1  pt will improve QuickDASH by 10%  -    LTG 1 Progress  Ongoing  -    LTG 2  Standing R shoulder flexion AROM to be >/= 100   -    LTG 2 Progress  Met  -    LTG 3  Standing R shoulder abduction AROM to be >/= 75 degrees   -    LTG 3 Progress  Met  -          PT Goal Re-Cert Due Date  01/09/19  -      User Key  (r) = Recorded By, (t) = Taken By, (c) = Cosigned By    Initials Name Provider Type     Alfonzo Maxwell, PT DPT Physical Therapist          PT Assessment/Plan     Row Name 12/19/18 1300          Functional Limitations  Decreased safety during functional activities;Limitation in home management;Limitations in community activities;Performance in self-care ADL  -    Impairments  Joint mobility;Muscle strength;Pain;Posture;Range of motion  -    Assessment Comments  Patient presented with flare of pain possibly due to DOMS/overworking muscles last therapy session. Her ROM and pain are improved at end of treatment session compared to beginning.  -SS    Rehab Potential  Fair  -    Patient/caregiver participated in establishment of treatment plan and goals  Yes  -    Patient would benefit from skilled therapy intervention  Yes  -SS          PT Frequency  2x/week  -    Predicted Duration of Therapy Intervention (Therapy Eval)  3-4 weeks with further TBA  -    PT Plan Comments  ROM, stretching, gentle strengthening, manual therapy, MHP as needed for pain'  -      User Key  (r) = Recorded By, (t) = Taken By, (c) = Cosigned By    Initials Name Provider Type    Alfonzo Pat, PT DPT Physical Therapist          Modalities     Row Name 12/19/18 1300             Moist Heat    MH Applied  Yes  -SS      Location  R shoulder/brachium  -      Rx Minutes  15 mins  -SS      MH Prior to Rx  No  -SS      MH S/P Rx  Yes  -SS        User Key  (r) = Recorded By, (t) = Taken By, (c) = Cosigned By    Initials Name  Provider Type    Alfonzo Pat, PT DPT Physical Therapist             Manual Rx (last 36 hours)      Manual Treatments     Row Name 12/19/18 1300          Manual Rx 1 Location  UT, scapular, shoulder musculature  -    Manual Rx 1 Type  MFR  -SS      User Key  (r) = Recorded By, (t) = Taken By, (c) = Cosigned By    Initials Name Provider Type    Alfonzo Pat, PT DPT Physical Therapist                      Outcome Measure Options: Quick DASH  Quick DASH  Open a tight or new jar.: Unable  Do heavy household chores (e.g., wash walls, wash floors): Unable  Carry a shopping bag or briefcase: Severe Difficulty  Wash your back: Unable  Use a knife to cut food: Severe Difficulty  Recreational activities in which you take some force or impact through your arm, should or hand (e.g. golf, hammering, tennis, etc.): Unable  During the past week, to what extent has your arm, shoulder, or hand problem interfered with your normal social activities with family, friends, neighbors or groups?: Quite a bit  During the past week, were you limited in your work or other regular daily activities as a result of your arm, shoulder or hand problem?: Very limited  Arm, Shoulder, or hand pain: Moderate  During the past week, how much difficulty have you had sleeping because of the pain in your arm, shoulder or hand?: Moderate Difficultly  Number of Questions Answered: 10  Quick DASH Score: 80         Time Calculation:         Start Time: 1303  Stop Time: 1348  Time Calculation (min): 45 min     Therapy Charges for Today     Code Description Service Date Service Provider Modifiers Qty    92240439069 HC PT MANUAL THERAPY EA 15 MIN 12/19/2018 Alfonzo Maxwell, PT DPT GP 2    06658153377 HC PT THER SUPP EA 15 MIN 12/19/2018 Alfonzo Maxwell, PT DPT GP 1                   Alfonzo Maxwell, PT, DPT, CHT  12/19/2018

## 2018-12-27 ENCOUNTER — HOSPITAL ENCOUNTER (OUTPATIENT)
Dept: PHYSICAL THERAPY | Facility: HOSPITAL | Age: 43
Setting detail: THERAPIES SERIES
Discharge: HOME | End: 2018-12-27

## 2018-12-27 DIAGNOSIS — M54.6 THORACIC SPINE PAIN: ICD-10-CM

## 2018-12-27 DIAGNOSIS — M25.511 CHRONIC RIGHT SHOULDER PAIN: Primary | ICD-10-CM

## 2018-12-27 DIAGNOSIS — G89.29 CHRONIC RIGHT SHOULDER PAIN: Primary | ICD-10-CM

## 2018-12-27 PROCEDURE — 97110 THERAPEUTIC EXERCISES: CPT

## 2018-12-27 NOTE — THERAPY TREATMENT NOTE
Outpatient Physical Therapy Ortho Treatment Note  [facility]     Patient Name: Christal Holder  : 1975  MRN: [id number]  Today's Date: 2018      Visit Date: 2018  Attendance: 15/15 (medical necessity)  Subjective Improvement: 35-40%  Next MD Appt: EMELINA  Recert Date: 19     Changes in Medications: none noted  Changes in MD Orders: none noted  Number of Work Days Lost: n/a          Visit Dx:    ICD-10-CM ICD-9-CM   1. Chronic right shoulder pain M25.511 719.41    G89.29 338.29   2. Thoracic spine pain M54.6 724.1       Patient Active Problem List   Diagnosis   • Suicide ideation   • Severe episode of recurrent major depressive disorder, without psychotic features (CMS/HCC)        Past Medical History:   Diagnosis Date   • Anxiety    • Atelectasis    • Chronic pain disorder    • Compression fracture of body of thoracic vertebra (CMS/HCC)     T6-T12   • COPD (chronic obstructive pulmonary disease) (CMS/HCC)     due to trauma   • Depression    • Diabetes mellitus (CMS/HCC)    • Glenoid fracture of shoulder    • Pneumothorax     due to rib fractures   • Rib fracture    • Scapular fracture         Past Surgical History:   Procedure Laterality Date   • CHOLECYSTECTOMY     • GLENOID OPEN REDUCTION INTERNAL FIXATION Right    • SCAPULA OPEN REDUCTION INTERNAL FIXATION     • TUBAL ABDOMINAL LIGATION         PT Ortho     Row Name 18 1300       Subjective Comments    Subjective Comments  Pt reports she feels good today. She reports she has consistently felt better since last session. She states her pain is low today. She consents to tx.   -MW       Subjective Pain    Able to rate subjective pain?  yes  -MW    Pre-Treatment Pain Level  3  -MW    Post-Treatment Pain Level  3  -MW       Posture/Observations    Posture/Observations Comments  Mild guarding present in R pec minor insertion and R UT  -MW      User Key  (r) = Recorded By, (t) = Taken By, (c) = Cosigned By    Initials Name  Provider Type    Kelsy Ayala, PT Physical Therapist                      PT Assessment/Plan     Row Name 12/27/18 1100          PT Assessment    Functional Limitations  Decreased safety during functional activities;Limitation in home management;Limitations in community activities;Performance in self-care ADL  -MW     Impairments  Joint mobility;Muscle strength;Pain;Posture;Range of motion  -MW     Assessment Comments  The pt tolerated tx well today. She performed well w/ new strengthening ther ex added this date. Will cont to progress as tolerated.   -MW     Rehab Potential  Fair  -MW     Patient/caregiver participated in establishment of treatment plan and goals  Yes  -MW     Patient would benefit from skilled therapy intervention  Yes  -MW        PT Plan    PT Frequency  2x/week  -MW     Predicted Duration of Therapy Intervention (Therapy Eval)  3-4 weeks w/ more TBD  -MW     PT Plan Comments  ROM, stretching, strengthening, manual and modalities PRN  -MW       User Key  (r) = Recorded By, (t) = Taken By, (c) = Cosigned By    Initials Name Provider Type    Kelsy Ayala, PT Physical Therapist              Exercises     Row Name 12/27/18 1300             Subjective Comments    Subjective Comments  Pt reports she feels good today. She reports she has consistently felt better since last session. She states her pain is low today. She consents to tx.   -MW         Subjective Pain    Able to rate subjective pain?  yes  -MW      Pre-Treatment Pain Level  3  -MW      Post-Treatment Pain Level  3  -MW         Exercise 1    Exercise Name 1  Pro 2, UE level 1-- ROM  -MW      Time 1  6'  -MW      Additional Comments  fwd/back  -MW         Exercise 2    Exercise Name 2  Pulleys flex  -MW      Sets 2  1  -MW      Reps 2  30  -MW         Exercise 3    Exercise Name 3  Pulleys abd  -MW      Sets 3  1  -MW      Reps 3  30  -MW         Exercise 4    Exercise Name 4  PROM shoulder  -MW      Time 4  10'  -MW          Exercise 5    Exercise Name 5  PNF diagonals-- D1/D2  -MW      Sets 5  2  -MW      Reps 5  15  -MW      Additional Comments  yellow TB  -MW         Exercise 6    Exercise Name 6  Bicep curls- yellow TB  -MW      Sets 6  2  -MW      Reps 6  15  -MW         Exercise 7    Exercise Name 7  TB horizontal abd  -MW      Sets 7  2  -MW      Reps 7  15  -MW         Exercise 8    Exercise Name 8  UT stretch w/ AMP  -MW      Sets 8  4  -MW      Time 8  x mobility  -MW        User Key  (r) = Recorded By, (t) = Taken By, (c) = Cosigned By    Initials Name Provider Type    MW Kelsy Hansen, PT Physical Therapist                         PT OP Goals     Row Name 12/27/18 1308          PT Short Term Goals    STG Date to Achieve  -- further STGs deferred  -MW     STG 1  pt will be independent w/ HEP  -MW     STG 1 Progress  Ongoing  -MW     STG 2  pt will report pain <4/10  -MW     STG 2 Progress  Met  -MW     STG 3  pt will improve AROM shoulder flex and abd by 10 deg  -MW     STG 3 Progress  Met  -MW        Long Term Goals    LTG Date to Achieve  12/27/18  -MW     LTG 1  pt will improve QuickDASH by 10%  -MW     LTG 1 Progress  Ongoing  -MW     LTG 2  Standing R shoulder flexion AROM to be >/= 100   -MW     LTG 2 Progress  Met  -MW     LTG 3  Standing R shoulder abduction AROM to be >/= 75 degrees   -MW     LTG 3 Progress  Met  -MW        Time Calculation    PT Goal Re-Cert Due Date  01/09/18  -MW       User Key  (r) = Recorded By, (t) = Taken By, (c) = Cosigned By    Initials Name Provider Type    MW Kelsy Hansen, PT Physical Therapist                         Time Calculation:   Start Time: 1106  Stop Time: 1149  Time Calculation (min): 43 min  PT Non-Billable Time (min): 2 min  Total Timed Code Minutes- PT: 41 minute(s)  Therapy Suggested Charges     Code   Minutes Charges    None           Therapy Charges for Today     Code Description Service Date Service Provider Modifiers Qty    72738128619 HC PT THER PROC EA 15 MIN  12/27/2018 Kelsy Hansen, PT GP 3                    Kelsy Hanesn, PT  12/27/2018

## 2019-01-02 ENCOUNTER — HOSPITAL ENCOUNTER (OUTPATIENT)
Dept: PHYSICAL THERAPY | Facility: HOSPITAL | Age: 44
Setting detail: THERAPIES SERIES
Discharge: HOME | End: 2019-01-02

## 2019-01-02 DIAGNOSIS — M25.511 CHRONIC RIGHT SHOULDER PAIN: Primary | ICD-10-CM

## 2019-01-02 DIAGNOSIS — G89.29 CHRONIC RIGHT SHOULDER PAIN: Primary | ICD-10-CM

## 2019-01-02 DIAGNOSIS — M54.6 THORACIC SPINE PAIN: ICD-10-CM

## 2019-01-02 PROCEDURE — 97110 THERAPEUTIC EXERCISES: CPT | Performed by: PHYSICAL THERAPIST

## 2019-01-03 NOTE — THERAPY TREATMENT NOTE
Outpatient Physical Therapy Ortho Progress Note  [facility]     Patient Name: Christal Holder  : 1975  MRN: [id number]  Today's Date: 2019      Visit Date: 2019  Attendance:  (medical necessity)  Subjective Improvement: 30-40%  Next MD Appt: EMELINA  Recert Date: 19    Visit Dx:    ICD-10-CM ICD-9-CM   1. Chronic right shoulder pain M25.511 719.41    G89.29 338.29   2. Thoracic spine pain M54.6 724.1       Patient Active Problem List   Diagnosis   • Suicide ideation   • Severe episode of recurrent major depressive disorder, without psychotic features (CMS/HCC)        Past Medical History:   Diagnosis Date   • Anxiety    • Atelectasis    • Chronic pain disorder    • Compression fracture of body of thoracic vertebra (CMS/HCC)     T6-T12   • COPD (chronic obstructive pulmonary disease) (CMS/HCC)     due to trauma   • Depression    • Diabetes mellitus (CMS/HCC)    • Glenoid fracture of shoulder    • Pneumothorax     due to rib fractures   • Rib fracture    • Scapular fracture         Past Surgical History:   Procedure Laterality Date   • CHOLECYSTECTOMY     • GLENOID OPEN REDUCTION INTERNAL FIXATION Right    • SCAPULA OPEN REDUCTION INTERNAL FIXATION     • TUBAL ABDOMINAL LIGATION         PT Ortho     Row Name 19 1300       Right Upper Ext    Rt Shoulder Abduction AROM  84  -SS    Rt Shoulder Flexion AROM  75  -SS    Rt Shoulder External Rotation AROM  23 seated neutral  -SS      User Key  (r) = Recorded By, (t) = Taken By, (c) = Cosigned By    Initials Name Provider Type    SS Alfonzo Maxwell, PT DPT Physical Therapist                      PT Assessment/Plan     Row Name 19 1300          Functional Limitations  Decreased safety during functional activities;Limitation in home management;Limitations in community activities;Performance in self-care ADL  -SS    Impairments  Joint mobility;Muscle strength;Pain;Posture;Range of motion  -SS    Assessment Comments   "Tolerated therapy well. Muscle soreness and fatigue at end of treatment. Continued AROM deficits.  -SS    Rehab Potential  Fair  -SS    Patient/caregiver participated in establishment of treatment plan and goals  Yes  -SS    Patient would benefit from skilled therapy intervention  Yes  -SS          PT Frequency  2x/week  -SS    Predicted Duration of Therapy Intervention (Therapy Eval)  3-4 weeks with further TBA  -SS    PT Plan Comments  Continue POC. Standing place and hold flexion, wall/table wipes with weight next. Recheck 1/9/19  -      User Key  (r) = Recorded By, (t) = Taken By, (c) = Cosigned By    Initials Name Provider Type     Alfonzo Maxwell, PT DPT Physical Therapist              Exercises     Row Name 01/02/19 1300          Subjective Comments  Pain \"not too bad.\" Did well with last therapy session. Has a intermittent flares of pain. Working with therapy putty helping how her arm feels.  -SS          Able to rate subjective pain?  yes  -SS    Pre-Treatment Pain Level  4  -SS    Post-Treatment Pain Level  4  -SS    Subjective Pain Comment  patient declines modalities  -SS          Exercise Name 1  Pro2, Seat 5, U/LE, F/R  -SS    Cueing 1  Verbal  -SS    Time 1  8 mins  -SS    Additional Comments  F/R  -SS          Exercise Name 2  Pulley flex  -SS    Cueing 2  Verbal  -SS    Sets 2  1  -SS    Reps 2  30  -SS          Exercise Name 3  Pulley abd  -SS    Cueing 3  Verbal  -SS    Sets 3  1  -SS    Reps 3  30  -SS          Exercise Name 4  Standing UE PNF D1 flex/ext  -SS    Cueing 4  Verbal;Tactile  -SS    Sets 4  1  -SS    Reps 4  15  -SS          Exercise Name 5  Standing clasped hand chest press with hands at chest height  -SS    Cueing 5  Verbal;Demo  -SS    Sets 5  2  -SS    Reps 5  15  -SS          Exercise Name 6  DB biceps curl with forearm rotation  -SS    Cueing 6  Verbal;Demo  -SS    Sets 6  2  -SS    Reps 6  10  -SS    Additional Comments  2# DB  -SS          Exercise Name 7  DB " biceps hammer curl  -SS    Cueing 7  Verbal;Demo  -SS    Sets 7  2  -SS    Reps 7  10  -SS    Additional Comments  2# DB  -SS          Exercise Name 8  T-band shoulder extension  -SS    Cueing 8  Verbal;Demo  -SS    Sets 8  2  -SS    Reps 8  10  -SS    Additional Comments  yellow  -SS          Exercise Name 9  T-band single arm row  -SS    Cueing 9  Verbal;Demo  -SS    Sets 9  2  -SS    Reps 9  10  -SS    Additional Comments  yellow  -SS          Exercise Name 10  T-band shoulder adduction   -SS    Cueing 10  Verbal  -SS    Sets 10  2  -SS    Reps 10  10  -SS    Additional Comments  yellow  -SS          Exercise Name 11  Forward wall touch for shoulder flexion  -SS    Cueing 11  Verbal;Demo  -SS    Reps 11  10  -SS          Exercise Name 12  T-band shoulder IR with arm neutral  -SS    Cueing 12  Verbal  -SS    Reps 12  2  -SS    Time 12  10  -SS    Additional Comments  yellow  -SS      User Key  (r) = Recorded By, (t) = Taken By, (c) = Cosigned By    Initials Name Provider Type     Alfonzo Maxwell, PT DPT Physical Therapist                         PT OP Goals     Row Name 01/02/19 1300          PT Short Term Goals    STG Date to Achieve  -- further STGs deferred  -SS        Long Term Goals    LTG Date to Achieve  12/27/18  -SS     LTG 1  pt will improve QuickDASH by 10%  -SS     LTG 1 Progress  Ongoing  -SS     LTG 2  Standing R shoulder flexion AROM to be >/= 100   -SS     LTG 2 Progress  Met  -SS     LTG 3  Standing R shoulder abduction AROM to be >/= 75 degrees   -SS     LTG 3 Progress  Met  -SS        Time Calculation    PT Goal Re-Cert Due Date  01/09/19  -       User Key  (r) = Recorded By, (t) = Taken By, (c) = Cosigned By    Initials Name Provider Type    Alfonzo Pat, PT DPT Physical Therapist          Therapy Education  Given: HEP  Program: Reinforced  How Provided: Verbal  Provided to: Patient  Level of Understanding: Verbalized              Time Calculation:   Start Time:  1301  Stop Time: 1339  Time Calculation (min): 38 min    Therapy Charges for Today     Code Description Service Date Service Provider Modifiers Qty    43589666618 HC PT THER PROC EA 15 MIN 1/2/2019 Alfonzo Maxwell, PT DPT GP 3                    Alfonzo Maxwell, PT, DPT, CHT  1/2/2019

## 2019-01-07 ENCOUNTER — HOSPITAL ENCOUNTER (OUTPATIENT)
Dept: PHYSICAL THERAPY | Facility: HOSPITAL | Age: 44
Setting detail: THERAPIES SERIES
Discharge: HOME | End: 2019-01-07

## 2019-01-07 DIAGNOSIS — M25.511 CHRONIC RIGHT SHOULDER PAIN: Primary | ICD-10-CM

## 2019-01-07 DIAGNOSIS — G89.29 CHRONIC RIGHT SHOULDER PAIN: Primary | ICD-10-CM

## 2019-01-07 DIAGNOSIS — M54.6 THORACIC SPINE PAIN: ICD-10-CM

## 2019-01-07 PROCEDURE — 97110 THERAPEUTIC EXERCISES: CPT | Performed by: PHYSICAL THERAPIST

## 2019-01-07 NOTE — THERAPY TREATMENT NOTE
Outpatient Physical Therapy Ortho Treatment Note  [facility]     Patient Name: Christal Holder  : 1975  MRN: [id number]  Today's Date: 2019      Visit Date: 2019  Attendance:  (medical necessity)  Subjective Improvement: 45-50%  Next MD Appt: EMELINA  Recert Date: 19      Visit Dx:    ICD-10-CM ICD-9-CM   1. Chronic right shoulder pain M25.511 719.41    G89.29 338.29   2. Thoracic spine pain M54.6 724.1       Patient Active Problem List   Diagnosis   • Suicide ideation   • Severe episode of recurrent major depressive disorder, without psychotic features (CMS/HCC)        Past Medical History:   Diagnosis Date   • Anxiety    • Atelectasis    • Chronic pain disorder    • Compression fracture of body of thoracic vertebra (CMS/HCC)     T6-T12   • COPD (chronic obstructive pulmonary disease) (CMS/HCC)     due to trauma   • Depression    • Diabetes mellitus (CMS/HCC)    • Glenoid fracture of shoulder    • Pneumothorax     due to rib fractures   • Rib fracture    • Scapular fracture         Past Surgical History:   Procedure Laterality Date   • CHOLECYSTECTOMY     • GLENOID OPEN REDUCTION INTERNAL FIXATION Right    • SCAPULA OPEN REDUCTION INTERNAL FIXATION     • TUBAL ABDOMINAL LIGATION         PT Ortho     Row Name 19 1100       Right Upper Ext    Rt Shoulder Abduction AROM  90  -SS    Rt Shoulder Flexion AROM  85  -SS      User Key  (r) = Recorded By, (t) = Taken By, (c) = Cosigned By    Initials Name Provider Type    SS Alfonzo Maxwell, PT DPT Physical Therapist                      PT Assessment/Plan     Row Name 19 1100          Functional Limitations  Decreased safety during functional activities;Limitation in home management;Limitations in community activities;Performance in self-care ADL  -SS    Impairments  Joint mobility;Muscle strength;Pain;Posture;Range of motion  -SS    Assessment Comments  R UT and cervical spine pulls with T-band shoulder adduction.  Otherwise, tolerated therapy well.   -SS    Rehab Potential  Fair  -SS    Patient/caregiver participated in establishment of treatment plan and goals  Yes  -SS    Patient would benefit from skilled therapy intervention  Yes  -SS          PT Frequency  2x/week  -SS    Predicted Duration of Therapy Intervention (Therapy Eval)  3-4 weeks with further TBA  -SS    PT Plan Comments  Schedule for recheck with primary P.T. next visit. Continue POC  -SS      User Key  (r) = Recorded By, (t) = Taken By, (c) = Cosigned By    Initials Name Provider Type    SS Alfonzo Maxwell, PT DPT Physical Therapist              Exercises     Row Name 01/07/19 1100          Subjective Comments  Muscle soreness and pain for a couple of days after last therapy session. Can feel it in her shoulder some when she moves around. 45-50% subjective improvement.   -SS          Able to rate subjective pain?  yes  -SS    Pre-Treatment Pain Level  4  -SS    Post-Treatment Pain Level  4  -SS    Subjective Pain Comment  patient declines modalities  -SS          Exercise Name 1  Pro2, Seat 5, U/LE, F  -SS    Cueing 1  Verbal  -SS    Time 1  10 mins  -SS          Exercise Name 2  Pulley flex  -SS    Cueing 2  Verbal  -SS    Sets 2  1  -SS    Reps 2  30  -SS          Exercise Name 3  Pulley abd  -SS    Cueing 3  Verbal  -SS    Sets 3  1  -SS    Reps 3  30  -SS          Exercise Name 4  Table wipes F/R  -SS    Cueing 4  Verbal;Demo  -SS    Sets 4  3  -SS    Reps 4  10  -SS          Exercise Name 5  Place and hold shoulder flexion at 90 deg  -SS    Cueing 5  Verbal;Tactile  -SS    Sets 5  2  -SS    Reps 5  10  -SS    Time 5  5 sec hold  -SS          Exercise Name 6  Standing shoulder ER and scap squeeze with elbows extended B   -SS    Cueing 6  Verbal;Demo  -SS    Sets 6  2  -SS    Reps 6  10  -SS          Exercise Name 7  T-band shoulder ext  -SS    Cueing 7  Verbal  -SS    Sets 7  3  -SS    Reps 7  10  -SS    Additional Comments  yellow  -SS           Exercise Name 8  T-band shoulder adduction  -SS    Cueing 8  Verbal  -SS    Sets 8  2  -SS    Reps 8  10  -SS    Additional Comments  yellow  -SS      User Key  (r) = Recorded By, (t) = Taken By, (c) = Cosigned By    Initials Name Provider Type    Alfonzo Pat, PT DPT Physical Therapist                         PT OP Goals     Row Name 01/07/19 1100          STG Date to Achieve  -- further STGs deferred  -SS          LTG Date to Achieve  12/27/18  -SS    LTG 1  pt will improve QuickDASH by 10%  -    LTG 1 Progress  Ongoing  -SS    LTG 2  Standing R shoulder flexion AROM to be >/= 100   -    LTG 2 Progress  Met  -SS    LTG 3  Standing R shoulder abduction AROM to be >/= 75 degrees   -    LTG 3 Progress  Met  -SS          PT Goal Re-Cert Due Date  01/09/19  -      User Key  (r) = Recorded By, (t) = Taken By, (c) = Cosigned By    Initials Name Provider Type     Alfonzo Maxwell, PT DPT Physical Therapist          Therapy Education  Given: HEP  Program: Reinforced  How Provided: Verbal  Provided to: Patient  Level of Understanding: Verbalized              Time Calculation:   Start Time: 1104  Stop Time: 1139  Time Calculation (min): 35 min    Therapy Charges for Today     Code Description Service Date Service Provider Modifiers Qty    06505067100 HC PT THER PROC EA 15 MIN 1/7/2019 Alfonzo Maxwell, PT DPT GP 2                    Alfonzo Maxwell, PT, DPT, CHT  1/7/2019

## 2019-01-09 ENCOUNTER — HOSPITAL ENCOUNTER (OUTPATIENT)
Dept: PHYSICAL THERAPY | Facility: HOSPITAL | Age: 44
Setting detail: THERAPIES SERIES
Discharge: HOME | End: 2019-01-09

## 2019-01-09 DIAGNOSIS — M25.511 CHRONIC RIGHT SHOULDER PAIN: Primary | ICD-10-CM

## 2019-01-09 DIAGNOSIS — G89.29 CHRONIC RIGHT SHOULDER PAIN: Primary | ICD-10-CM

## 2019-01-09 DIAGNOSIS — M54.6 THORACIC SPINE PAIN: ICD-10-CM

## 2019-01-09 PROCEDURE — 97110 THERAPEUTIC EXERCISES: CPT

## 2019-01-09 NOTE — THERAPY PROGRESS REPORT/RE-CERT
Outpatient Physical Therapy Ortho Progress Note  [facility]     Patient Name: Christal Holder  : 1975  MRN: [id number]  Today's Date: 2019      Visit Date: 2019   Attendance:  (medical necessity)  Subjective Improvement: 45-50%  Next MD Appt: EMELINA  Recert Date: 19        Patient Active Problem List   Diagnosis   • Suicide ideation   • Severe episode of recurrent major depressive disorder, without psychotic features (CMS/HCC)        Past Medical History:   Diagnosis Date   • Anxiety    • Atelectasis    • Chronic pain disorder    • Compression fracture of body of thoracic vertebra (CMS/HCC)     T6-T12   • COPD (chronic obstructive pulmonary disease) (CMS/HCC)     due to trauma   • Depression    • Diabetes mellitus (CMS/HCC)    • Glenoid fracture of shoulder    • Pneumothorax     due to rib fractures   • Rib fracture    • Scapular fracture         Past Surgical History:   Procedure Laterality Date   • CHOLECYSTECTOMY     • GLENOID OPEN REDUCTION INTERNAL FIXATION Right    • SCAPULA OPEN REDUCTION INTERNAL FIXATION     • TUBAL ABDOMINAL LIGATION         Visit Dx:     ICD-10-CM ICD-9-CM   1. Chronic right shoulder pain M25.511 719.41    G89.29 338.29   2. Thoracic spine pain M54.6 724.1           PT Ortho     Row Name 19 1100       Subjective Comments    Subjective Comments  The pt reports she feels 45-50% better than when she first started therapy. She states that she can tell she is getting better. She state she fears a set-back. She consents to tx and reassess. The pt states whenever she tries to use her shoulder to lift anything more than 2# she has severe difficulty and often cannot do this.    -MW       Subjective Pain    Able to rate subjective pain?  yes  -MW       Posture/Observations    Posture/Observations Comments  Increased guarding present in R UT.  -MW       Right Upper Ext    Rt Shoulder Abduction AROM  84  -MW    Rt Shoulder Flexion AROM  94  -MW    Rt  Shoulder External Rotation AROM  20 seated neutral  -MW        Strength Right    # Reps  1  -MW    Right Rung  2  -MW    Right  Test 1  40  -MW     Strength Average Right  40  -MW        Strength Left    # Reps  1  -MW    Left Rung  2  -MW    Left  Test 1  65  -MW     Strength Average Left  65  -MW    Row Name 01/07/19 1100       Right Upper Ext    Rt Shoulder Abduction AROM  90  -SS    Rt Shoulder Flexion AROM  85  -SS      User Key  (r) = Recorded By, (t) = Taken By, (c) = Cosigned By    Initials Name Provider Type    SS Alfonzo Maxwell, PT DPT Physical Therapist    Kelsy Ayala PT Physical Therapist                            PT OP Goals     Row Name 01/09/19 1100          PT Short Term Goals    STG Date to Achieve  -- further STGs deferred  -MW        Long Term Goals    LTG Date to Achieve  01/30/19  -MW     LTG 1  pt will improve QuickDASH by 10%  -MW     LTG 1 Progress  Ongoing  -MW     LTG 2  Standing R shoulder flexion AROM to be >/= 100   -MW     LTG 2 Progress  Met  -MW     LTG 3  Standing R shoulder abduction AROM to be >/= 75 degrees   -MW     LTG 3 Progress  Met  -MW        Time Calculation    PT Goal Re-Cert Due Date  01/30/19  -MW       User Key  (r) = Recorded By, (t) = Taken By, (c) = Cosigned By    Initials Name Provider Type    Kelsy Ayala PT Physical Therapist          PT Assessment/Plan     Row Name 01/09/19 1100          PT Assessment    Functional Limitations  Decreased safety during functional activities;Limitation in home management;Limitations in community activities;Performance in self-care ADL  -MW     Impairments  Joint mobility;Muscle strength;Pain;Posture;Range of motion  -     Assessment Comments  The pt has made both strength and ROM improvements since her last reassess as indicated by objective measures taken this date. She continues to be limited w/ R UE functional mobility and continues to remain limited to shoulder height ADLs.  Her R UE is currently able to perform very light ADLs (2# or less); however, her arm fatigues quickly. It is my impression that pt has the potential to continue to make significant strength gains of the R UE in order to maximize the functional strength she has in the R shoulder. She would benefit from continued therapy services to address these deficits. Will cont to address the pt's strength and mobility deficits as tolerated.   -MW     Rehab Potential  Fair  -MW     Patient/caregiver participated in establishment of treatment plan and goals  Yes  -MW     Patient would benefit from skilled therapy intervention  Yes  -MW        PT Plan    PT Frequency  2x/week  -MW     Predicted Duration of Therapy Intervention (Therapy Eval)  3 weeks  -MW     PT Plan Comments  Cont R shoulder strengthening and endurance. Promote mobility as tolerated. Manaul PRN.  -MW       User Key  (r) = Recorded By, (t) = Taken By, (c) = Cosigned By    Initials Name Provider Type    Kelsy Ayala, PT Physical Therapist            Exercises     Row Name 01/09/19 1100             Subjective Comments    Subjective Comments  The pt reports she feels 45-50% better than when she first started therapy. She states that she can tell she is getting better. She state she fears a set-back. She consents to tx and reassess. The pt states whenever she tries to use her shoulder to lift anything more than 2# she has severe difficulty and often cannot do this.    -MW         Subjective Pain    Able to rate subjective pain?  yes  -MW      Pre-Treatment Pain Level  4  -MW      Post-Treatment Pain Level  4  -MW         Exercise 1    Exercise Name 1  Pro2, Seat 5, U/LE, level 1.5  -MW      Cueing 1  Verbal  -MW      Time 1  7 mins fwd/back  -MW      Additional Comments  Shoulder strength/endurance  -MW         Exercise 2    Exercise Name 2  PROM shoulder w/ stretching at end range  -MW      Time 2  5'  -MW         Exercise 3    Exercise Name 3  AROM shoulder   -MW      Time 3  5'  -MW         Exercise 4    Exercise Name 4  Standing shoulder IR w/ TB  -MW      Sets 4  2  -MW      Reps 4  10  -MW         Exercise 5    Exercise Name 5  Standing shoulder extension  -MW      Sets 5  2  -MW      Reps 5  10  -MW      Additional Comments  w/ TB  -MW         Exercise 6    Exercise Name 6  Standing PNF diagonals w/ magnets-- D1/D2  -MW      Sets 6  2  -MW      Reps 6  10  -MW         Exercise 7    Exercise Name 7  Standing wall wipes-- shoulder flex  -MW      Sets 7  1  -MW      Reps 7  15  -MW        User Key  (r) = Recorded By, (t) = Taken By, (c) = Cosigned By    Initials Name Provider Type    Kelsy Ayala, PT Physical Therapist           Manual Rx (last 36 hours)      Manual Treatments     Row Name 01/09/19 1100             Manual Rx 1    Manual Rx 1 Location  manual UT stretch  -MW      Manual Rx 1 Duration  1'  -MW         Manual Rx 2    Manual Rx 2 Location  R UT  -MW      Manual Rx 2 Type  MFR  -MW      Manual Rx 2 Duration  2'  -MW        User Key  (r) = Recorded By, (t) = Taken By, (c) = Cosigned By    Initials Name Provider Type    Kelsy Ayala, PT Physical Therapist                      Quick DASH  Open a tight or new jar.: Unable  Do heavy household chores (e.g., wash walls, wash floors): Unable  Carry a shopping bag or briefcase: Severe Difficulty  Wash your back: Unable  Use a knife to cut food: Severe Difficulty  Recreational activities in which you take some force or impact through your arm, should or hand (e.g. golf, hammering, tennis, etc.): Unable  During the past week, to what extent has your arm, shoulder, or hand problem interfered with your normal social activites with family, friends, neighbors or groups?: Moderately  During the past week, were you limited in your work or other regular daily activities as a result of your arm, shoulder or hand problem?: Very limited  Arm, Shoulder, or hand pain: Severe  Tingling (pins and needles) in your arm,  shoulder, or hand: Moderate  During the past week, how much difficulty have you had sleeping because of the pain in your arm, shoulder or hand?: Moderate Difficiculty  Number of Questions Answered: 11  Quick DASH Score: 77.27         Time Calculation:         Start Time: 1107  Stop Time: 1152  Time Calculation (min): 45 min  PT Non-Billable Time (min): 2 min  Total Timed Code Minutes- PT: 43 minute(s)     Therapy Charges for Today     Code Description Service Date Service Provider Modifiers Qty    36000502753 HC PT THER PROC EA 15 MIN 1/9/2019 Kelsy Hansen, PT GP 3                   Kelsy Hansen, PT  1/9/2019

## 2019-01-14 ENCOUNTER — HOSPITAL ENCOUNTER (OUTPATIENT)
Dept: PHYSICAL THERAPY | Facility: HOSPITAL | Age: 44
Setting detail: THERAPIES SERIES
Discharge: HOME | End: 2019-01-14

## 2019-01-14 DIAGNOSIS — G89.29 CHRONIC RIGHT SHOULDER PAIN: Primary | ICD-10-CM

## 2019-01-14 DIAGNOSIS — M25.511 CHRONIC RIGHT SHOULDER PAIN: Primary | ICD-10-CM

## 2019-01-14 DIAGNOSIS — M54.6 THORACIC SPINE PAIN: ICD-10-CM

## 2019-01-14 PROCEDURE — 97110 THERAPEUTIC EXERCISES: CPT

## 2019-01-14 PROCEDURE — 97140 MANUAL THERAPY 1/> REGIONS: CPT

## 2019-01-15 NOTE — THERAPY TREATMENT NOTE
Outpatient Physical Therapy Ortho Treatment Note  [facility]     Patient Name: Christal Holder  : 1975  MRN: [id number]  Today's Date: 2019      Visit Date: 2019  Attendance:  (medical necessity)  Subjective Improvement: 45-50%  Next MD Appt: EMELINA  Recert Date: 19          Visit Dx:    ICD-10-CM ICD-9-CM   1. Chronic right shoulder pain M25.511 719.41    G89.29 338.29   2. Thoracic spine pain M54.6 724.1       Patient Active Problem List   Diagnosis   • Suicide ideation   • Severe episode of recurrent major depressive disorder, without psychotic features (CMS/HCC)        Past Medical History:   Diagnosis Date   • Anxiety    • Atelectasis    • Chronic pain disorder    • Compression fracture of body of thoracic vertebra (CMS/HCC)     T6-T12   • COPD (chronic obstructive pulmonary disease) (CMS/HCC)     due to trauma   • Depression    • Diabetes mellitus (CMS/HCC)    • Glenoid fracture of shoulder    • Pneumothorax     due to rib fractures   • Rib fracture    • Scapular fracture         Past Surgical History:   Procedure Laterality Date   • CHOLECYSTECTOMY     • GLENOID OPEN REDUCTION INTERNAL FIXATION Right    • SCAPULA OPEN REDUCTION INTERNAL FIXATION     • TUBAL ABDOMINAL LIGATION         PT Ortho     Row Name 19 1100       Subjective Pain    Post-Treatment Pain Level  5  -MW       Posture/Observations    Posture/Observations Comments  Pt's ROM stiff/mildly rigid this date. ROM improved after manual tx  -MW       Right Upper Ext    Rt Shoulder Abduction AROM  87  -MW    Rt Shoulder Flexion AROM  85  -MW    Rt Shoulder External Rotation AROM  25 seated neutral  -MW      User Key  (r) = Recorded By, (t) = Taken By, (c) = Cosigned By    Initials Name Provider Type    Kelsy Ayala, PT Physical Therapist                      PT Assessment/Plan     Row Name 19 1800          PT Assessment    Functional Limitations  Decreased safety during functional  "activities;Limitation in home management;Limitations in community activities;Performance in self-care ADL  -MW     Impairments  Joint mobility;Muscle strength;Pain;Posture;Range of motion  -MW     Assessment Comments  The pt tolerated tx well. Manual tx applied secondary to pt's increased guarding/tightness this date. It is my impression pt's increased sxs are secondary to having an \"off\" day. Will cont to progress as tolerated.   -MW     Rehab Potential  Fair  -MW     Patient/caregiver participated in establishment of treatment plan and goals  Yes  -MW     Patient would benefit from skilled therapy intervention  Yes  -MW        PT Plan    PT Frequency  2x/week  -MW     Predicted Duration of Therapy Intervention (Therapy Eval)  3 weeks  -MW     PT Plan Comments  Progress shoulder ther ex next. Cont per POC as tolerated.   -MW       User Key  (r) = Recorded By, (t) = Taken By, (c) = Cosigned By    Initials Name Provider Type    Kelsy Ayala, PT Physical Therapist              Exercises     Row Name 01/14/19 1100             Subjective Comments    Subjective Comments  The pt states she is very stiff today. She states her neck is hurting her and \"pulling really bad\". She consents to tx.   -MW         Subjective Pain    Able to rate subjective pain?  yes  -MW      Pre-Treatment Pain Level  5  -MW      Post-Treatment Pain Level  5  -MW         Exercise 1    Exercise Name 1  Pro2, Seat 5, U/LE, level 1.5  -MW      Cueing 1  Verbal  -MW      Time 1  8 mins fwd/back  -MW         Exercise 2    Exercise Name 2  see manual  -MW         Exercise 3    Exercise Name 3  PROM shoulder  -MW      Time 3  8'  -MW        User Key  (r) = Recorded By, (t) = Taken By, (c) = Cosigned By    Initials Name Provider Type    Kelsy Ayala, PT Physical Therapist                        Manual Rx (last 36 hours)      Manual Treatments     Row Name 01/14/19 1700             Manual Rx 1    Manual Rx 1 Location  Scap stabilizers/teres " bundle/parapsinals  -MW      Manual Rx 1 Type  MFR for increased tissue extensibility  -MW      Manual Rx 1 Grade  cross hands and unwinding technique used  -MW      Manual Rx 1 Duration  20'  -MW         Manual Rx 2    Manual Rx 2 Location  R UT  -MW      Manual Rx 2 Type  manual stretch  -MW      Manual Rx 2 Duration  4'  -MW        User Key  (r) = Recorded By, (t) = Taken By, (c) = Cosigned By    Initials Name Provider Type    MW Kelsy Hansen, PT Physical Therapist          PT OP Goals     Row Name 01/14/19 1821          PT Short Term Goals    STG Date to Achieve  -- further STGs deferred  -MW        Long Term Goals    LTG Date to Achieve  01/30/19  -MW     LTG 1  pt will improve QuickDASH by 10%  -MW     LTG 1 Progress  Ongoing  -MW     LTG 2  Standing R shoulder flexion AROM to be >/= 100   -MW     LTG 2 Progress  Met  -MW     LTG 3  Standing R shoulder abduction AROM to be >/= 75 degrees   -MW     LTG 3 Progress  Met  -MW        Time Calculation    PT Goal Re-Cert Due Date  01/30/19  -MW       User Key  (r) = Recorded By, (t) = Taken By, (c) = Cosigned By    Initials Name Provider Type    Kelsy Ayala, PT Physical Therapist                         Time Calculation:   Start Time: 1104  Stop Time: 1147  Time Calculation (min): 43 min  PT Non-Billable Time (min): 3 min  Total Timed Code Minutes- PT: 40 minute(s)    Therapy Charges for Today     Code Description Service Date Service Provider Modifiers Qty    66529450776 HC PT THER PROC EA 15 MIN 1/14/2019 Kelsy Hansen, PT GP 1    13318393863 HC PT MANUAL THERAPY EA 15 MIN 1/14/2019 Kelsy Hansen PT GP 2                    Kelsy Hansen PT  1/14/2019

## 2019-01-16 ENCOUNTER — HOSPITAL ENCOUNTER (OUTPATIENT)
Dept: PHYSICAL THERAPY | Facility: HOSPITAL | Age: 44
Setting detail: THERAPIES SERIES
Discharge: HOME | End: 2019-01-16

## 2019-01-16 DIAGNOSIS — M54.6 THORACIC SPINE PAIN: ICD-10-CM

## 2019-01-16 DIAGNOSIS — M25.511 CHRONIC RIGHT SHOULDER PAIN: Primary | ICD-10-CM

## 2019-01-16 DIAGNOSIS — G89.29 CHRONIC RIGHT SHOULDER PAIN: Primary | ICD-10-CM

## 2019-01-16 PROCEDURE — 97110 THERAPEUTIC EXERCISES: CPT | Performed by: PHYSICAL THERAPIST

## 2019-01-16 NOTE — THERAPY TREATMENT NOTE
Outpatient Physical Therapy Ortho Treatment Note  [facility]     Patient Name: Christal Holder  : 1975  MRN: [id number]  Today's Date: 2019      Visit Date: 2019  Attendance:  (medical necessity)  Subjective Improvement: 50-60%  Next MD Appt: EMELINA  Recert Date: 19    Visit Dx:    ICD-10-CM ICD-9-CM   1. Chronic right shoulder pain M25.511 719.41    G89.29 338.29   2. Thoracic spine pain M54.6 724.1            Past Medical History:   Diagnosis Date   • Anxiety    • Atelectasis    • Chronic pain disorder    • Compression fracture of body of thoracic vertebra (CMS/HCC)     T6-T12   • COPD (chronic obstructive pulmonary disease) (CMS/HCC)     due to trauma   • Depression    • Diabetes mellitus (CMS/HCC)    • Glenoid fracture of shoulder    • Pneumothorax     due to rib fractures   • Rib fracture    • Scapular fracture         Past Surgical History:   Procedure Laterality Date   • CHOLECYSTECTOMY     • GLENOID OPEN REDUCTION INTERNAL FIXATION Right    • SCAPULA OPEN REDUCTION INTERNAL FIXATION     • TUBAL ABDOMINAL LIGATION         PT Ortho     Row Name 19 1300       Right Upper Ext    Rt Shoulder Abduction AROM  80 after treatment  -SS    Rt Shoulder Flexion AROM  95 after treatment  -SS    Rt Shoulder External Rotation AROM  22 seated neutral after treatment  -SS    Row Name 19 1100       Subjective Pain    Post-Treatment Pain Level  5  -MW       Posture/Observations    Posture/Observations Comments  Pt's ROM stiff/mildly rigid this date. ROM improved after manual tx  -MW       Right Upper Ext    Rt Shoulder Abduction AROM  87  -MW    Rt Shoulder Flexion AROM  85  -MW    Rt Shoulder External Rotation AROM  25 seated neutral  -MW      User Key  (r) = Recorded By, (t) = Taken By, (c) = Cosigned By    Initials Name Provider Type    Alfonzo Pat, PT DPT Physical Therapist    Kelsy Ayala PT Physical Therapist                      PT Assessment/Plan      Row Name 01/16/19 1300          Functional Limitations  Decreased safety during functional activities;Limitation in home management;Limitations in community activities;Performance in self-care ADL  -SS    Impairments  Joint mobility;Muscle strength;Pain;Posture;Range of motion  -    Assessment Comments  Sore with isometrics. Generalized pain and discomfort. ROM variable dependent upon pain. Popping noted in the right shoulder with passive flexion.  -SS    Rehab Potential  Fair  -SS    Patient/caregiver participated in establishment of treatment plan and goals  Yes  -SS    Patient would benefit from skilled therapy intervention  Yes  -SS          PT Frequency  2x/week  -SS    Predicted Duration of Therapy Intervention (Therapy Eval)  3 weeks  -SS    PT Plan Comments  Continue POC. Next: attempt shoulder flexion and abduction with ankle weight on brachium.  -SS      User Key  (r) = Recorded By, (t) = Taken By, (c) = Cosigned By    Initials Name Provider Type    SS Alfonzo Maxwell, PT DPT Physical Therapist              Exercises     Row Name 01/16/19 1300          Subjective Comments  Stiff and sore today. Doing okay. 50-60% subjective improvement.  -SS          Able to rate subjective pain?  yes  -SS    Pre-Treatment Pain Level  4  -SS    Post-Treatment Pain Level  4  -SS    Subjective Pain Comment  declines modalities  -SS          Exercise Name 1  Pro2, Seat 7, U/LE, F/B  -SS    Cueing 1  Verbal  -SS    Time 1  10 mins  -SS    Additional Comments  Level 1.5  -SS          Exercise Name 2  Pulley flexion, scaption  -SS    Cueing 2  Verbal  -SS    Reps 2  30 each  -SS          Exercise Name 3  T-band shoulder IR  -SS    Cueing 3  Verbal  -SS    Sets 3  2  -SS    Reps 3  10  -SS    Additional Comments  red t-band  -SS          Exercise Name 4  T-band shoulder extension  -SS    Cueing 4  Verbal  -SS    Sets 4  2  -SS    Reps 4  10  -SS    Additional Comments  red t-band  -SS          Exercise Name 5  T-band  low row  -SS    Cueing 5  Verbal;Demo  -SS    Sets 5  2  -SS    Reps 5  10  -SS    Additional Comments  red t-band  -SS          Exercise Name 6  Supine isometric shoulder flexion at 90 deg  -SS    Cueing 6  Verbal;Tactile  -SS    Sets 6  1  -SS    Reps 6  10  -SS    Time 6  3 sec hold  -SS          Exercise Name 7  Supine isometric shoulder extension at 90 deg  -SS    Cueing 7  Verbal;Tactile  -SS    Sets 7  1  -SS    Reps 7  10  -SS    Time 7  3 sec hold  -SS          Exercise Name 8  Supine isometric shoulder horiz add at 90 deg  -SS    Cueing 8  Verbal;Tactile  -SS    Sets 8  1  -SS    Reps 8  10  -SS    Time 8  3 sec hold  -SS          Exercise Name 9  Supine isometric shoulder horiz abduction at 90 deg  -SS    Cueing 9  Verbal;Tactile  -SS    Sets 9  1  -SS    Reps 9  10  -SS    Time 9  3 sec hold  -SS          Exercise Name 10  Supine isometric shoulder abduction with arm at side  -SS    Cueing 10  Verbal;Tactile  -SS    Sets 10  1  -SS    Reps 10  10  -SS    Time 10  3 sec hold  -SS          Exercise Name 11  PROM flexion, abduction, ER before and after supine isometrics  -SS    Cueing 11  Tactile  -SS    Reps 11  15 each  -SS      User Key  (r) = Recorded By, (t) = Taken By, (c) = Cosigned By    Initials Name Provider Type    Alfonzo Pat, PT DPT Physical Therapist                         PT OP Goals     Row Name 01/16/19 1300          STG Date to Achieve  -- further STGs deferred  -SS          LTG Date to Achieve  01/30/19  -SS    LTG 1  pt will improve QuickDASH by 10%  -    LTG 1 Progress  Ongoing  -SS    LTG 2  Standing R shoulder flexion AROM to be >/= 100   -SS    LTG 2 Progress  Met  -SS    LTG 3  Standing R shoulder abduction AROM to be >/= 75 degrees   -    LTG 3 Progress  Met  -SS          PT Goal Re-Cert Due Date  01/30/19  -      User Key  (r) = Recorded By, (t) = Taken By, (c) = Cosigned By    Initials Name Provider Type    Alfonzo Pat, PT DPT Physical Therapist                          Time Calculation:   Start Time: 1349  Stop Time: 1434  Time Calculation (min): 45 min    Therapy Charges for Today     Code Description Service Date Service Provider Modifiers Qty    45980507449 HC PT THER PROC EA 15 MIN 1/16/2019 Alfonzo Maxwell, PT DPT GP 3                    Alfonzo Maxwell, PT, DPT, CHT  1/16/2019

## 2019-01-21 ENCOUNTER — APPOINTMENT (OUTPATIENT)
Dept: PHYSICAL THERAPY | Facility: HOSPITAL | Age: 44
End: 2019-01-21

## 2019-01-23 ENCOUNTER — HOSPITAL ENCOUNTER (OUTPATIENT)
Dept: PHYSICAL THERAPY | Facility: HOSPITAL | Age: 44
Setting detail: THERAPIES SERIES
Discharge: HOME | End: 2019-01-23

## 2019-01-23 DIAGNOSIS — M54.6 THORACIC SPINE PAIN: ICD-10-CM

## 2019-01-23 DIAGNOSIS — G89.29 CHRONIC RIGHT SHOULDER PAIN: Primary | ICD-10-CM

## 2019-01-23 DIAGNOSIS — M25.511 CHRONIC RIGHT SHOULDER PAIN: Primary | ICD-10-CM

## 2019-01-23 PROCEDURE — 97110 THERAPEUTIC EXERCISES: CPT

## 2019-01-23 NOTE — THERAPY TREATMENT NOTE
Outpatient Physical Therapy Ortho Treatment Note  [facility]     Patient Name: Christal Holder  : 1975  MRN: [id number]  Today's Date: 2019      Visit Date: 2019  Visit Date: 2019  Attendance:  (medical necessity)  Subjective Improvement: 50-60%  Next MD Appt: EMELINA  Recert Date: 19      Visit Dx:    ICD-10-CM ICD-9-CM   1. Chronic right shoulder pain M25.511 719.41    G89.29 338.29   2. Thoracic spine pain M54.6 724.1       Patient Active Problem List   Diagnosis   • Suicide ideation   • Severe episode of recurrent major depressive disorder, without psychotic features (CMS/HCC)        Past Medical History:   Diagnosis Date   • Anxiety    • Atelectasis    • Chronic pain disorder    • Compression fracture of body of thoracic vertebra (CMS/HCC)     T6-T12   • COPD (chronic obstructive pulmonary disease) (CMS/HCC)     due to trauma   • Depression    • Diabetes mellitus (CMS/HCC)    • Glenoid fracture of shoulder    • Pneumothorax     due to rib fractures   • Rib fracture    • Scapular fracture         Past Surgical History:   Procedure Laterality Date   • CHOLECYSTECTOMY     • GLENOID OPEN REDUCTION INTERNAL FIXATION Right    • SCAPULA OPEN REDUCTION INTERNAL FIXATION     • TUBAL ABDOMINAL LIGATION         PT Ortho     Row Name 19 1000       Posture/Observations    Posture/Observations Comments  gurading present in R UT during active abd and flex  -MW      User Key  (r) = Recorded By, (t) = Taken By, (c) = Cosigned By    Initials Name Provider Type    Kelsy Ayala, PT Physical Therapist                      PT Assessment/Plan     Row Name 19 1000          PT Assessment    Functional Limitations  Decreased safety during functional activities;Limitation in home management;Limitations in community activities;Performance in self-care ADL  -MW     Impairments  Joint mobility;Muscle strength;Pain;Posture;Range of motion  -MW     Assessment Comments  Pt  tolerated tx well. She had increased activity tolerance during today's ther ex than noted during previous sessions. It is my impression her shoulder endurance is improving. Will cont to progress as tolerated.   -MW     Rehab Potential  Fair  -MW     Patient/caregiver participated in establishment of treatment plan and goals  Yes  -MW     Patient would benefit from skilled therapy intervention  Yes  -MW        PT Plan    PT Frequency  2x/week  -MW     Predicted Duration of Therapy Intervention (Therapy Eval)  3 weeks  -MW     PT Plan Comments  Recert next week. Cont per POC. Attempt to progress TB STARS to red band next.  -MW       User Key  (r) = Recorded By, (t) = Taken By, (c) = Cosigned By    Initials Name Provider Type    Kelsy Ayala, PT Physical Therapist              Exercises     Row Name 01/23/19 1000             Subjective Comments    Subjective Comments  The pt reports she feels stiff today. She notes she is sorer maybe due to weather. She consents to tx.   -MW         Subjective Pain    Able to rate subjective pain?  yes  -MW      Pre-Treatment Pain Level  4  -MW      Post-Treatment Pain Level  4  -MW         Exercise 1    Exercise Name 1  Pulleys flexion  -MW      Cueing 1  Verbal  -MW      Sets 1  1  -MW      Reps 1  30  -MW         Exercise 2    Exercise Name 2  Pulleys abduction  -MW      Cueing 2  Verbal  -MW      Sets 2  1  -MW      Reps 2  30  -MW         Exercise 3    Exercise Name 3  PROM shoulder w/ stretching at end range  -MW      Time 3  7'  -MW         Exercise 4    Exercise Name 4  shoulder abduction w/ 2# cuff weight on brachium  -MW      Cueing 4  Verbal  -MW      Sets 4  2  -MW      Reps 4  10  -MW         Exercise 5    Exercise Name 5  shoulder flexion w/ 2# cuff weight on brachium  -MW      Cueing 5  Verbal  -MW      Sets 5  2  -MW      Reps 5  10  -MW         Exercise 6    Exercise Name 6  shoulder extension w/ 2# cuff weight on brachium  -MW      Cueing 6  Verbal  -MW       Sets 6  2  -MW      Reps 6  10  -MW         Exercise 7    Exercise Name 7  wand flex w/ 2#   -MW      Cueing 7  Demo  -MW      Sets 7  1  -MW      Reps 7  20  -MW         Exercise 8    Exercise Name 8  TB STARS  -MW      Cueing 8  Demo  -MW      Sets 8  4  -MW      Reps 8  10  -MW         Exercise 9    Exercise Name 9  Pro 2-- UE for strength/ROM  -MW      Time 9  5'  -MW      Additional Comments  level 2  -MW        User Key  (r) = Recorded By, (t) = Taken By, (c) = Cosigned By    Initials Name Provider Type    Kelsy Ayala, PT Physical Therapist                         PT OP Goals     Row Name 01/23/19 1000          PT Short Term Goals    STG Date to Achieve  -- further STGs deferred  -MW        Long Term Goals    LTG Date to Achieve  01/30/19  -MW     LTG 1  pt will improve QuickDASH by 10%  -MW     LTG 1 Progress  Ongoing  -MW     LTG 2  Standing R shoulder flexion AROM to be >/= 100   -MW     LTG 2 Progress  Met  -MW     LTG 3  Standing R shoulder abduction AROM to be >/= 75 degrees   -MW     LTG 3 Progress  Met  -MW        Time Calculation    PT Goal Re-Cert Due Date  01/30/19  -MW       User Key  (r) = Recorded By, (t) = Taken By, (c) = Cosigned By    Initials Name Provider Type    Kelsy Ayala, PT Physical Therapist                         Time Calculation:   Start Time: 1012  Stop Time: 1056  Time Calculation (min): 44 min  PT Non-Billable Time (min): 3 min  Total Timed Code Minutes- PT: 41 minute(s)  Therapy Suggested Charges     Code   Minutes Charges    None           Therapy Charges for Today     Code Description Service Date Service Provider Modifiers Qty    22170357853 HC PT THER PROC EA 15 MIN 1/23/2019 Kelsy Hansen, PT GP 3                    Kelsy Hansen PT  1/23/2019

## 2019-01-29 ENCOUNTER — HOSPITAL ENCOUNTER (OUTPATIENT)
Dept: PHYSICAL THERAPY | Facility: HOSPITAL | Age: 44
Setting detail: THERAPIES SERIES
Discharge: HOME | End: 2019-01-29

## 2019-01-29 DIAGNOSIS — G89.29 CHRONIC RIGHT SHOULDER PAIN: Primary | ICD-10-CM

## 2019-01-29 DIAGNOSIS — M54.6 THORACIC SPINE PAIN: ICD-10-CM

## 2019-01-29 DIAGNOSIS — M25.511 CHRONIC RIGHT SHOULDER PAIN: Primary | ICD-10-CM

## 2019-01-29 PROCEDURE — 97530 THERAPEUTIC ACTIVITIES: CPT | Performed by: PHYSICAL THERAPIST

## 2019-01-29 NOTE — THERAPY TREATMENT NOTE
Outpatient Physical Therapy Ortho Treatment Note  [facility]     Patient Name: Christal Holder  : 1975  MRN: [id number]  Today's Date: 2019      Visit Date: 2019  Attendance:  (medical necessity)  Subjective Improvement: 50-60%  Next MD Appt: EMELINA  Recert Date: 19    Visit Dx:    ICD-10-CM ICD-9-CM   1. Chronic right shoulder pain M25.511 719.41    G89.29 338.29   2. Thoracic spine pain M54.6 724.1            Past Medical History:   Diagnosis Date   • Anxiety    • Atelectasis    • Chronic pain disorder    • Compression fracture of body of thoracic vertebra (CMS/HCC)     T6-T12   • COPD (chronic obstructive pulmonary disease) (CMS/HCC)     due to trauma   • Depression    • Diabetes mellitus (CMS/HCC)    • Glenoid fracture of shoulder    • Pneumothorax     due to rib fractures   • Rib fracture    • Scapular fracture         Past Surgical History:   Procedure Laterality Date   • CHOLECYSTECTOMY     • GLENOID OPEN REDUCTION INTERNAL FIXATION Right    • SCAPULA OPEN REDUCTION INTERNAL FIXATION     • TUBAL ABDOMINAL LIGATION         PT Ortho     Row Name 19 0900       Subjective Pain    Able to rate subjective pain?  yes  -SS    Pre-Treatment Pain Level  5  -SS    Post-Treatment Pain Level  4  -SS       Posture/Observations    Posture/Observations Comments  guards R UT  -SS       Right Upper Ext    Rt Shoulder Flexion AROM  80 prior to mirror therapy; 96 after mirror therapy, 110 post-treatment  -SS      User Key  (r) = Recorded By, (t) = Taken By, (c) = Cosigned By    Initials Name Provider Type    SS Alfonzo Maxwell, PT DPT Physical Therapist                      PT Assessment/Plan     Row Name 19 0900          Functional Limitations  Decreased safety during functional activities;Limitation in home management;Limitations in community activities;Performance in self-care ADL  -SS    Impairments  Joint mobility;Muscle strength;Pain;Posture;Range of motion  -SS     Assessment Comments  30 degree improvement in R shoulder flexion AROM with mirror therapy this date. Minimal difficulty with laterality training using hands.   -SS    Rehab Potential  Fair  -SS    Patient/caregiver participated in establishment of treatment plan and goals  Yes  -SS    Patient would benefit from skilled therapy intervention  Yes  -SS          PT Frequency  2x/week  -SS    Predicted Duration of Therapy Intervention (Therapy Eval)  3 weeks  -SS    PT Plan Comments  Recheck next. Laterality training with shoulder/UE images.   -SS      User Key  (r) = Recorded By, (t) = Taken By, (c) = Cosigned By    Initials Name Provider Type    Alfonzo Pat, PT DPT Physical Therapist          Modalities     Row Name 01/29/19 0900           Applied  Yes  -SS    Location  trunk and R shoulder  -SS    Rx Minutes  10 mins  -SS    MH Prior to Rx  Yes  -SS    MH S/P Rx  No  -SS      User Key  (r) = Recorded By, (t) = Taken By, (c) = Cosigned By    Initials Name Provider Type    Alfonzo Pat, PT DPT Physical Therapist          Exercises     Row Name 01/29/19 0900          Subjective Comments  Pain thoracic spine and right cervical region. Doing okay. More stiff and sore from temperature being cold.  -SS          Able to rate subjective pain?  yes  -SS    Pre-Treatment Pain Level  5  -SS    Post-Treatment Pain Level  4  -SS          Exercise Name 1  Mirror Therapy shoulder/UE -- visualization of L  -SS    Cueing 1  Verbal  -SS    Time 1  4 mins  -SS          Exercise Name 2  Mirror Therapy shoulder/UE -- flex L shoulder  -SS    Cueing 2  Verbal  -SS    Time 2  2 mins  -SS          Exercise Name 3  Mirror Therapy shoulder/UE -- visualization of L  -SS    Cueing 3  Verbal  -SS    Time 3  2 mins  -SS          Exercise Name 4  Mirror Therapy shoulder/UE -- open/close B fists  -SS    Cueing 4  Verbal  -SS    Time 4  1 min  -SS          Exercise Name 5  Mirror Therapy shoulder/UE -- flex/extend B elbows   -    Cueing 5  Verbal  -SS    Time 5  1 min  -SS          Exercise Name 6  Mirror Therapy shoulder/UE -- B shoulder flexion  -SS    Cueing 6  Verbal  -SS    Time 6  30 sec  -SS          Exercise Name 7  Laterality training  -SS    Cueing 7  Verbal;Demo  -    Additional Comments  24 correct, 1 incorrect, 0 passes in 25 seconds  -      User Key  (r) = Recorded By, (t) = Taken By, (c) = Cosigned By    Initials Name Provider Type     Alfonzo Maxwell, PT DPT Physical Therapist                         PT OP Goals     Row Name 01/29/19 0900          STG Date to Achieve  -- further STGs deferred  -          LTG Date to Achieve  01/30/19  -    LTG 1  pt will improve QuickDASH by 10%  -    LTG 1 Progress  Ongoing  -    LTG 2  Standing R shoulder flexion AROM to be >/= 100   -    LTG 2 Progress  Met  -    LTG 3  Standing R shoulder abduction AROM to be >/= 75 degrees   -    LTG 3 Progress  Met  -          PT Goal Re-Cert Due Date  01/30/19  -      User Key  (r) = Recorded By, (t) = Taken By, (c) = Cosigned By    Initials Name Provider Type     Alfonzo Maxwell, PT DPT Physical Therapist          Therapy Education  Given: HEP  Program: Reinforced  How Provided: Verbal  Provided to: Patient  Level of Understanding: Verbalized              Time Calculation:   Start Time: 0930    Therapy Charges for Today     Code Description Service Date Service Provider Modifiers Qty    42532094112  PT THERAPEUTIC ACT EA 15 MIN 1/29/2019 Alfonzo Maxwell, PT DPT GP 3                    Alfonzo Maxwell, PT, DPT, CHT  1/29/2019

## 2019-01-31 ENCOUNTER — HOSPITAL ENCOUNTER (OUTPATIENT)
Dept: PHYSICAL THERAPY | Facility: HOSPITAL | Age: 44
Setting detail: THERAPIES SERIES
Discharge: HOME | End: 2019-01-31

## 2019-01-31 DIAGNOSIS — G89.29 CHRONIC RIGHT SHOULDER PAIN: Primary | ICD-10-CM

## 2019-01-31 DIAGNOSIS — M25.511 CHRONIC RIGHT SHOULDER PAIN: Primary | ICD-10-CM

## 2019-01-31 DIAGNOSIS — M54.6 THORACIC SPINE PAIN: ICD-10-CM

## 2019-01-31 PROCEDURE — 97110 THERAPEUTIC EXERCISES: CPT

## 2019-02-05 ENCOUNTER — HOSPITAL ENCOUNTER (OUTPATIENT)
Dept: PHYSICAL THERAPY | Facility: HOSPITAL | Age: 44
Setting detail: THERAPIES SERIES
Discharge: HOME | End: 2019-02-05

## 2019-02-05 DIAGNOSIS — G89.29 CHRONIC RIGHT SHOULDER PAIN: Primary | ICD-10-CM

## 2019-02-05 DIAGNOSIS — M25.511 CHRONIC RIGHT SHOULDER PAIN: Primary | ICD-10-CM

## 2019-02-05 DIAGNOSIS — M54.6 THORACIC SPINE PAIN: ICD-10-CM

## 2019-02-05 PROCEDURE — 97110 THERAPEUTIC EXERCISES: CPT | Performed by: PHYSICAL THERAPIST

## 2019-02-06 NOTE — THERAPY TREATMENT NOTE
Outpatient Physical Therapy Ortho Treatment Note  [facility]     Patient Name: Christal Holder  : 1975  MRN: [id number]  Today's Date: 2019      Visit Date: 2019  Attendance:  (medical necessity)  Subjective Improvement: 50-60%  Next MD Appt: EMELINA  Recert Date: 19    Visit Dx:    ICD-10-CM ICD-9-CM   1. Chronic right shoulder pain M25.511 719.41    G89.29 338.29   2. Thoracic spine pain M54.6 724.1            Past Medical History:   Diagnosis Date   • Anxiety    • Atelectasis    • Chronic pain disorder    • Compression fracture of body of thoracic vertebra (CMS/HCC)     T6-T12   • COPD (chronic obstructive pulmonary disease) (CMS/HCC)     due to trauma   • Depression    • Diabetes mellitus (CMS/HCC)    • Glenoid fracture of shoulder    • Pneumothorax     due to rib fractures   • Rib fracture    • Scapular fracture         Past Surgical History:   Procedure Laterality Date   • CHOLECYSTECTOMY     • GLENOID OPEN REDUCTION INTERNAL FIXATION Right    • SCAPULA OPEN REDUCTION INTERNAL FIXATION     • TUBAL ABDOMINAL LIGATION         PT Ortho     Row Name 19 0900       Subjective Comments    Subjective Comments  Pain 4/10 today but more in back than shoulder. Thinks that her bed/mattress may be contributing some to back pain.  -SS       Subjective Pain    Able to rate subjective pain?  yes  -SS    Pre-Treatment Pain Level  4  -SS    Post-Treatment Pain Level  4  -SS    Subjective Pain Comment  declines modalities  -SS       Right Upper Ext    Rt Shoulder Abduction AROM  104 deg pre-mirror therapy; 110 deg post-mirror therapy  -SS    Rt Shoulder Flexion AROM  104 deg pre-mirror therapy; 120 deg post-mirror therapy  -SS    Rt Shoulder External Rotation AROM  27 deg pre-mirror therapy; 25 deg post-mirror therapy  -SS      User Key  (r) = Recorded By, (t) = Taken By, (c) = Cosigned By    Initials Name Provider Type    Alfonzo Pat, PT DPT Physical Therapist                       PT Assessment/Plan     Row Name 02/05/19 0900          Functional Limitations  Decreased safety during functional activities;Limitation in home management;Limitations in community activities;Performance in self-care ADL  -SS    Impairments  Joint mobility;Muscle strength;Pain;Posture;Range of motion  -SS    Assessment Comments  Demonstrable improvement in AROM with carryover after initiation of mirror therapy. Tolerated treatment well.   -SS    Rehab Potential  Fair  -SS    Patient/caregiver participated in establishment of treatment plan and goals  Yes  -SS    Patient would benefit from skilled therapy intervention  Yes  -SS          PT Frequency  2x/week  -SS    Predicted Duration of Therapy Intervention (Therapy Eval)  3 weeks  -SS    PT Plan Comments  Continue POC. Add resistance for elbow and shoulder exercises during mirror therapy next.  -SS      User Key  (r) = Recorded By, (t) = Taken By, (c) = Cosigned By    Initials Name Provider Type    SS Alfonzo Maxwell, PT DPT Physical Therapist              Exercises     Row Name 02/05/19 0900          Subjective Comments  Pain 4/10 today but more in back than shoulder. Thinks that her bed/mattress may be contributing some to back pain.  -SS          Able to rate subjective pain?  yes  -SS    Pre-Treatment Pain Level  4  -SS    Post-Treatment Pain Level  4  -SS    Subjective Pain Comment  declines modalities  -SS          Exercise Name 1  Mirror Therapy shoulder/UE -- visualization of L  -SS    Cueing 1  Verbal  -SS    Time 1  3 mins  -SS          Exercise Name 2  Mirror Therapy shoulder/UE -- flex/extend B elbow  -SS    Cueing 2  Verbal  -SS    Time 2  1 min  -SS          Exercise Name 3  Mirror Therapy shoulder/UE -- flex L shoulder  -SS    Cueing 3  Verbal  -SS    Time 3  1 min  -SS          Exercise Name 4  Mirror Therapy shoulder/UE -- visualization of L  -SS    Cueing 4  Verbal  -SS    Time 4  1 min  -SS          Exercise Name 5  Mirror  Therapy shoulder/UE -- abduct L shoulder  -SS    Cueing 5  Verbal;Demo  -SS    Time 5  1 min  -SS          Exercise Name 6  Mirror Therapy shoulder/UE -- visualization of L  -SS    Cueing 6  Verbal  -SS    Time 6  1 min  -SS          Exercise Name 7  Mirror Therapy shoulder/UE -- ER/IR L shoulder  -SS    Cueing 7  Verbal;Demo  -SS    Time 7  1 min  -SS          Exercise Name 8  Mirror Therapy shoulder/UE -- B shoulder flexion  -SS    Cueing 8  Verbal;Demo  -SS    Time 8  1 mn  -SS          Exercise Name 9  Mirror Therapy shoulder/UE -- visualization of L  -SS    Cueing 9  Verbal  -SS    Time 9  1 min  -SS          Exercise Name 10  Mirror Therapy shoulder/UE -- B shoulder abduction  -SS    Cueing 10  Verbal;Demo  -SS    Time 10  30 sec  -SS          Exercise Name 11  Mirror Therapy shoulder/UE -- visualization of L  -SS    Cueing 11  Verbal  -SS    Time 11  1 min  -SS          Exercise Name 12  Mirror Therapy shoulder/UE -- B shoulder ER/IR  -SS    Cueing 12  Verbal;Demo  -SS    Time 12  30 sec  -SS      User Key  (r) = Recorded By, (t) = Taken By, (c) = Cosigned By    Initials Name Provider Type    Alfonzo Pat, PT DPT Physical Therapist                         PT OP Goals     Row Name 02/05/19 0900       PT Short Term Goals    STG Date to Achieve  -- further STGs deferred  -SS       Long Term Goals    LTG Date to Achieve  02/21/19  -    LTG 1  pt will improve QuickDASH by 10%  -    LTG 1 Progress  Ongoing  -SS    LTG 2  Standing R shoulder flexion AROM to be >/= 100   -    LTG 2 Progress  Met  -SS    LTG 3  Standing R shoulder abduction AROM to be >/= 75 degrees   -    LTG 3 Progress  Met  -SS       Time Calculation    PT Goal Re-Cert Due Date  02/21/19  -      User Key  (r) = Recorded By, (t) = Taken By, (c) = Cosigned By    Initials Name Provider Type    Alfonzo Pat, PT DPT Physical Therapist          Therapy Education  Given: HEP  Program: Reinforced  How Provided:  Verbal  Provided to: Patient  Level of Understanding: Verbalized              Time Calculation:   Start Time: 0933  Stop Time: 1002  Time Calculation (min): 29 min    Therapy Charges for Today     Code Description Service Date Service Provider Modifiers Qty    03330960810 HC PT THER PROC EA 15 MIN 2/5/2019 Alfonzo Maxwell, PT DPT GP 2                    Alfonzo Maxwell, PT, DPT, CHT  2/5/2019

## 2019-02-07 ENCOUNTER — HOSPITAL ENCOUNTER (OUTPATIENT)
Dept: PHYSICAL THERAPY | Facility: HOSPITAL | Age: 44
Setting detail: THERAPIES SERIES
Discharge: HOME | End: 2019-02-07

## 2019-02-07 DIAGNOSIS — M54.6 THORACIC SPINE PAIN: ICD-10-CM

## 2019-02-07 DIAGNOSIS — G89.29 CHRONIC RIGHT SHOULDER PAIN: Primary | ICD-10-CM

## 2019-02-07 DIAGNOSIS — M25.511 CHRONIC RIGHT SHOULDER PAIN: Primary | ICD-10-CM

## 2019-02-07 PROCEDURE — 97110 THERAPEUTIC EXERCISES: CPT

## 2019-02-07 NOTE — THERAPY TREATMENT NOTE
Outpatient Physical Therapy Ortho Treatment Note  [facility]     Patient Name: Christal Holder  : 1975  MRN: [id number]  Today's Date: 2019      Visit Date: 2019    Visit Dx:    ICD-10-CM ICD-9-CM   1. Chronic right shoulder pain M25.511 719.41    G89.29 338.29   2. Thoracic spine pain M54.6 724.1       Patient Active Problem List   Diagnosis   • Suicide ideation   • Severe episode of recurrent major depressive disorder, without psychotic features (CMS/HCC)        Past Medical History:   Diagnosis Date   • Anxiety    • Atelectasis    • Chronic pain disorder    • Compression fracture of body of thoracic vertebra (CMS/HCC)     T6-T12   • COPD (chronic obstructive pulmonary disease) (CMS/HCC)     due to trauma   • Depression    • Diabetes mellitus (CMS/HCC)    • Glenoid fracture of shoulder    • Pneumothorax     due to rib fractures   • Rib fracture    • Scapular fracture         Past Surgical History:   Procedure Laterality Date   • CHOLECYSTECTOMY     • GLENOID OPEN REDUCTION INTERNAL FIXATION Right    • SCAPULA OPEN REDUCTION INTERNAL FIXATION     • TUBAL ABDOMINAL LIGATION         PT Ortho     Row Name 19 09       Subjective Comments    Subjective Comments  The pt states she feels about the same today as she usually does. She notes she is stiff. She consents to tx. She states she has back tightness today.   -MW       Subjective Pain    Able to rate subjective pain?  yes  -MW    Pre-Treatment Pain Level  4  -MW    Post-Treatment Pain Level  4  -MW       Right Upper Ext    Rt Shoulder Abduction AROM  95 deg pre mirror tx, 110 deg post tx  -MW    Rt Shoulder Flexion AROM  102 deg pre mirror tx, 113 deg post tx  -MW    Rt Shoulder External Rotation AROM  20 deg pre mirror tx, 26 deg post tx  -MW    Row Name 19 0900       Subjective Comments    Subjective Comments  Pain 4/10 today but more in back than shoulder. Thiks that her bed/mattress may be contributing some to back  pain.  -SS       Subjective Pain    Able to rate subjective pain?  yes  -SS    Pre-Treatment Pain Level  4  -SS    Post-Treatment Pain Level  4  -SS    Subjective Pain Comment  declines modalities  -SS       Right Upper Ext    Rt Shoulder Abduction AROM  104 deg pre-mirror therapy; 110 deg post-mirror therapy  -SS    Rt Shoulder Flexion AROM  104 deg pre-mirror therapy; 120 deg post-mirror therapy  -SS    Rt Shoulder External Rotation AROM  27 deg pre-mirror therapy; 25 deg post-mirror therapy  -SS      User Key  (r) = Recorded By, (t) = Taken By, (c) = Cosigned By    Initials Name Provider Type    SS Alfonzo Maxwell, PT DPT Physical Therapist    Kelsy Ayala, PT Physical Therapist                      PT Assessment/Plan     Row Name 02/07/19 0900          PT Assessment    Functional Limitations  Decreased safety during functional activities;Limitation in home management;Limitations in community activities;Performance in self-care ADL  -MW     Impairments  Joint mobility;Muscle strength;Pain;Posture;Range of motion  -MW     Assessment Comments  The pt tolerated tx well. Resisted mirror exercises attempted today and pt performed reps for time for added shoulder endurance. She had severe fatigue w/ this; however, AROM post-tx continue to be better than pre-tx measures. Will cont to progress as tolerated.   -MW     Rehab Potential  Fair  -MW     Patient/caregiver participated in establishment of treatment plan and goals  Yes  -MW     Patient would benefit from skilled therapy intervention  Yes  -MW        PT Plan    PT Frequency  2x/week  -MW     Predicted Duration of Therapy Intervention (Therapy Eval)  3 weeks  -MW     PT Plan Comments  Cont w/ mirror rehab  -MW       User Key  (r) = Recorded By, (t) = Taken By, (c) = Cosigned By    Initials Name Provider Type    Kelsy Ayala PT Physical Therapist              Exercises     Row Name 02/07/19 0900             Subjective Comments    Subjective  Comments  The pt states she feels about the same today as she usually does. She notes she is stiff. She consents to tx. She states she has back tightness today.   -MW         Subjective Pain    Able to rate subjective pain?  yes  -MW      Pre-Treatment Pain Level  4  -MW      Post-Treatment Pain Level  4  -MW         Exercise 1    Exercise Name 1  Pro2, Seat 7, U/LE, F/B  -MW      Cueing 1  Verbal  -MW      Time 1  7 mins  -MW      Additional Comments  L 2  -MW         Exercise 2    Exercise Name 2  Mirror Therapy shoulder/UE -- flex L shoulder  -MW      Cueing 2  Verbal  -MW      Time 2  1 min  -MW         Exercise 3    Exercise Name 3  Mirror Therapy shoulder/UE -- flex L shoulder  -MW      Cueing 3  Verbal  -MW      Time 3  1 min  -MW      Additional Comments  w/ 2#  -MW         Exercise 4    Exercise Name 4  Mirror Therapy shoulder/UE -- ER L shoulder  -MW      Cueing 4  Verbal  -MW      Time 4  1 min  -MW         Exercise 5    Exercise Name 5  Mirror Therapy shoulder/UE -- ER L shoulder  -MW      Cueing 5  Verbal  -MW      Time 5  1 min  -MW      Additional Comments  w/ 2#  -MW         Exercise 6    Exercise Name 6  Mirror Therapy shoulder/UE -- abduction L shoulder  -MW      Cueing 6  Verbal  -MW      Time 6  1 min  -MW         Exercise 7    Exercise Name 7  Mirror Therapy shoulder/UE -- abduction L shoulder  -MW      Cueing 7  Verbal  -MW      Time 7  1 min  -MW      Additional Comments  w/ 2#  -MW         Exercise 8    Exercise Name 8  Mirror Therapy shoulder/UE -- bicep curls  -MW      Cueing 8  Verbal  -MW      Time 8  1 min  -MW         Exercise 9    Exercise Name 9  Mirror Therapy shoulder/UE -- bicep curls  -MW      Cueing 9  Verbal  -MW      Time 9  1 min  -MW      Additional Comments  w/ 2#  -MW         Exercise 10    Exercise Name 10  AROM shoulder  -MW      Time 10  5'  -MW        User Key  (r) = Recorded By, (t) = Taken By, (c) = Cosigned By    Initials Name Provider Type    Kelsy Ayala, PT  Physical Therapist                         PT OP Goals     Row Name 02/07/19 1344 02/07/19 0900       PT Short Term Goals    STG Date to Achieve  --  -- further STGs deferred  -MW       Long Term Goals    LTG Date to Achieve  --  02/21/19  -MW    LTG 1  --  pt will improve QuickDASH by 10%  -MW    LTG 1 Progress  --  Ongoing  -MW    LTG 2  --  Standing R shoulder flexion AROM to be >/= 100   -MW    LTG 2 Progress  --  Met  -MW    LTG 3  --  Standing R shoulder abduction AROM to be >/= 75 degrees   -MW    LTG 3 Progress  --  Met  -MW       Time Calculation    PT Goal Re-Cert Due Date  02/21/19  -MW  02/21/19  -MW      User Key  (r) = Recorded By, (t) = Taken By, (c) = Cosigned By    Initials Name Provider Type    MW Kelsy Hansen, PT Physical Therapist                         Time Calculation:   Start Time: 0937  Stop Time: 1016  Time Calculation (min): 39 min  Total Timed Code Minutes- PT: 39 minute(s)  Therapy Suggested Charges     Code   Minutes Charges    None           Therapy Charges for Today     Code Description Service Date Service Provider Modifiers Qty    03403073519 HC PT THER PROC EA 15 MIN 2/7/2019 Kelsy Hansen, PT GP 3                    Kelsy Hansen PT  2/7/2019

## 2019-02-12 ENCOUNTER — HOSPITAL ENCOUNTER (OUTPATIENT)
Dept: PHYSICAL THERAPY | Facility: HOSPITAL | Age: 44
Setting detail: THERAPIES SERIES
Discharge: HOME | End: 2019-02-12

## 2019-02-12 DIAGNOSIS — M25.511 CHRONIC RIGHT SHOULDER PAIN: Primary | ICD-10-CM

## 2019-02-12 DIAGNOSIS — M54.6 THORACIC SPINE PAIN: ICD-10-CM

## 2019-02-12 DIAGNOSIS — G89.29 CHRONIC RIGHT SHOULDER PAIN: Primary | ICD-10-CM

## 2019-02-12 PROCEDURE — 97110 THERAPEUTIC EXERCISES: CPT

## 2019-02-12 NOTE — THERAPY TREATMENT NOTE
Outpatient Physical Therapy Ortho Treatment Note  [facility]     Patient Name: Christal Holder  : 1975  MRN: [id number]  Today's Date: 2019      Visit Date: 2019  Attendance:  (medical necessity)  Subjective Improvement: 50-60%  Next MD Appt: EMELINA  Recert Date: 19      Visit Dx:    ICD-10-CM ICD-9-CM   1. Chronic right shoulder pain M25.511 719.41    G89.29 338.29   2. Thoracic spine pain M54.6 724.1       Patient Active Problem List   Diagnosis   • Suicide ideation   • Severe episode of recurrent major depressive disorder, without psychotic features (CMS/HCC)        Past Medical History:   Diagnosis Date   • Anxiety    • Atelectasis    • Chronic pain disorder    • Compression fracture of body of thoracic vertebra (CMS/HCC)     T6-T12   • COPD (chronic obstructive pulmonary disease) (CMS/HCC)     due to trauma   • Depression    • Diabetes mellitus (CMS/HCC)    • Glenoid fracture of shoulder    • Pneumothorax     due to rib fractures   • Rib fracture    • Scapular fracture         Past Surgical History:   Procedure Laterality Date   • CHOLECYSTECTOMY     • GLENOID OPEN REDUCTION INTERNAL FIXATION Right    • SCAPULA OPEN REDUCTION INTERNAL FIXATION     • TUBAL ABDOMINAL LIGATION         PT Ortho     Row Name 19 0900       Subjective Comments    Subjective Comments  The pt notes she is feeling pretty good today. She states that this round of therapy is the best she has felt in regards to her shoulder progress. She consents to tx.   -MW       Right Upper Ext    Rt Shoulder Abduction AROM  105 deg pre mirror, 105 deg post tx  -MW    Rt Shoulder Flexion AROM  120 deg pre mirror, 120 deg post tx  -MW    Rt Shoulder External Rotation AROM  15 deg pre mirror, 25 deg post tx  -MW      User Key  (r) = Recorded By, (t) = Taken By, (c) = Cosigned By    Initials Name Provider Type    Kelsy Ayala, PT Physical Therapist                      PT Assessment/Plan     Row Name  02/12/19 0900          PT Assessment    Functional Limitations  Decreased safety during functional activities;Limitation in home management;Limitations in community activities;Performance in self-care ADL  -MW     Impairments  Joint mobility;Muscle strength;Pain;Posture;Range of motion  -MW     Assessment Comments  The pt measured 120 deg AROM flex pre and post tx today. This is a significant ROM improvement over prior tx sessions. This ROM improvement noted prior to tx day suggests that the pt is having significant and lasting carryover from mirror tx. Will cont to progress as tolerated.    -MW     Rehab Potential  Fair  -MW     Patient/caregiver participated in establishment of treatment plan and goals  Yes  -MW     Patient would benefit from skilled therapy intervention  Yes  -MW        PT Plan    PT Frequency  2x/week  -MW     Predicted Duration of Therapy Intervention (Therapy Eval)  3 weeks  -MW     PT Plan Comments  Cont w/ mirror therapy, cont w/ wheel roll outs  -MW       User Key  (r) = Recorded By, (t) = Taken By, (c) = Cosigned By    Initials Name Provider Type    Kelsy Ayala, PT Physical Therapist              Exercises     Row Name 02/12/19 0900             Subjective Comments    Subjective Comments  The pt notes she is feeling pretty good today. She states that this round of therapy is the best she has felt in regards to her shoulder progress. She consents to tx.   -MW         Subjective Pain    Able to rate subjective pain?  yes  -MW      Pre-Treatment Pain Level  -- 3.5  -MW      Post-Treatment Pain Level  4  -MW         Exercise 1    Exercise Name 1  Pro2, Seat 7, U/LE, F/B  -MW      Cueing 1  Verbal  -MW      Time 1  6 mins  -MW      Additional Comments  L 2  -MW         Exercise 2    Exercise Name 2  Mirror Therapy shoulder/UE -- flex L shoulder  -MW      Cueing 2  Verbal  -MW      Time 2  1 min  -MW         Exercise 3    Exercise Name 3  Mirror Therapy shoulder/UE -- flex L shoulder  -MW       Cueing 3  Verbal  -MW      Time 3  1 min  -MW      Additional Comments  w/ 2#  -MW         Exercise 4    Exercise Name 4  Mirror Therapy shoulder/UE -- ER L shoulder  -MW      Cueing 4  Verbal  -MW      Time 4  1 min  -MW         Exercise 5    Exercise Name 5  Mirror Therapy shoulder/UE -- ER L shoulder  -MW      Cueing 5  Verbal  -MW      Time 5  1 min  -MW      Additional Comments  w/ 2#  -MW         Exercise 6    Exercise Name 6  Mirror Therapy shoulder/UE -- abduction L shoulder  -MW      Cueing 6  Verbal  -MW      Time 6  1 min  -MW         Exercise 7    Exercise Name 7  Mirror Therapy shoulder/UE -- abduction L shoulder  -MW      Cueing 7  Verbal  -MW      Time 7  1 min  -MW      Additional Comments  w/ 2#  -MW         Exercise 8    Exercise Name 8  Mirror Therapy shoulder/UE -- bicep curls  -MW      Cueing 8  Verbal  -MW      Time 8  1 min  -MW         Exercise 9    Exercise Name 9  Mirror Therapy shoulder/UE -- bicep curls  -MW      Cueing 9  Verbal  -MW      Time 9  1 min  -MW      Additional Comments  w/ 2#  -MW         Exercise 10    Exercise Name 10  AROM shoulder  -MW      Time 10  5'  -MW         Exercise 11    Exercise Name 11  Wheel roll outs-- shoulder flex/ext for scap stabilization  -MW      Sets 11  1  -MW      Reps 11  25  -MW        User Key  (r) = Recorded By, (t) = Taken By, (c) = Cosigned By    Initials Name Provider Type    Kelsy Ayala, PT Physical Therapist                         PT OP Goals     Row Name 02/12/19 0900          PT Short Term Goals    STG Date to Achieve  -- further STGs deferred  -MW        Long Term Goals    LTG Date to Achieve  02/21/19  -MW     LTG 1  pt will improve QuickDASH by 10%  -MW     LTG 1 Progress  Ongoing  -MW     LTG 2  Standing R shoulder flexion AROM to be >/= 100   -MW     LTG 2 Progress  Met  -MW     LTG 3  Standing R shoulder abduction AROM to be >/= 75 degrees   -MW     LTG 3 Progress  Met  -MW        Time Calculation    PT Goal Re-Cert  Due Date  02/21/19  -KENYATTA       User Key  (r) = Recorded By, (t) = Taken By, (c) = Cosigned By    Initials Name Provider Type    Kelsy Ayala, PT Physical Therapist                         Time Calculation:   Start Time: 0930  Stop Time: 1015  Time Calculation (min): 45 min  Total Timed Code Minutes- PT: 45 minute(s)  Therapy Suggested Charges     Code   Minutes Charges    None           Therapy Charges for Today     Code Description Service Date Service Provider Modifiers Qty    54282507340 HC PT THER PROC EA 15 MIN 2/12/2019 Kelsy Hansen, PT GP 3                    Kelsy Hansen, PT  2/12/2019

## 2019-02-14 ENCOUNTER — HOSPITAL ENCOUNTER (OUTPATIENT)
Dept: PHYSICAL THERAPY | Facility: HOSPITAL | Age: 44
Setting detail: THERAPIES SERIES
Discharge: HOME | End: 2019-02-14

## 2019-02-14 DIAGNOSIS — M25.511 CHRONIC RIGHT SHOULDER PAIN: Primary | ICD-10-CM

## 2019-02-14 DIAGNOSIS — G89.29 CHRONIC RIGHT SHOULDER PAIN: Primary | ICD-10-CM

## 2019-02-14 DIAGNOSIS — M54.6 THORACIC SPINE PAIN: ICD-10-CM

## 2019-02-14 PROCEDURE — 97110 THERAPEUTIC EXERCISES: CPT | Performed by: PHYSICAL THERAPIST

## 2019-02-14 NOTE — THERAPY TREATMENT NOTE
Outpatient Physical Therapy Ortho Treatment Note  [facility]     Patient Name: Christal Holder  : 1975  MRN: [id number]  Today's Date: 2019      Visit Date: 2019  Attendance:  (medical necessity)  Subjective Improvement: 50-60%  Next MD Appt: EMELINA  Recert Date: 19    Visit Dx:    ICD-10-CM ICD-9-CM   1. Chronic right shoulder pain M25.511 719.41    G89.29 338.29   2. Thoracic spine pain M54.6 724.1            Past Medical History:   Diagnosis Date   • Anxiety    • Atelectasis    • Chronic pain disorder    • Compression fracture of body of thoracic vertebra (CMS/HCC)     T6-T12   • COPD (chronic obstructive pulmonary disease) (CMS/HCC)     due to trauma   • Depression    • Diabetes mellitus (CMS/HCC)    • Glenoid fracture of shoulder    • Pneumothorax     due to rib fractures   • Rib fracture    • Scapular fracture         Past Surgical History:   Procedure Laterality Date   • CHOLECYSTECTOMY     • GLENOID OPEN REDUCTION INTERNAL FIXATION Right    • SCAPULA OPEN REDUCTION INTERNAL FIXATION     • TUBAL ABDOMINAL LIGATION         PT Ortho     Row Name 19 0900       Right Upper Ext    Rt Shoulder Abduction AROM  120 post-treatment  -SS    Rt Shoulder Flexion AROM  127 post-treatment  -SS    Rt Shoulder External Rotation AROM  27 post-treatment  -SS    Row Name 19 0800       Right Upper Ext    Rt Shoulder Abduction AROM  103  -SS    Rt Shoulder Flexion AROM  105; tight and painful  -SS      User Key  (r) = Recorded By, (t) = Taken By, (c) = Cosigned By    Initials Name Provider Type    SS Alfonzo Maxwell, PT DPT Physical Therapist                      PT Assessment/Plan     Row Name 19 0800          Functional Limitations  Decreased safety during functional activities;Limitation in home management;Limitations in community activities;Performance in self-care ADL  -SS    Impairments  Joint mobility;Muscle strength;Pain;Posture;Range of motion  -SS     Assessment Comments  Improved AROM post-treatment this date. Patient gives good effort.  -SS    Rehab Potential  Fair  -SS    Patient/caregiver participated in establishment of treatment plan and goals  Yes  -SS    Patient would benefit from skilled therapy intervention  Yes  -SS          PT Frequency  2x/week  -SS    Predicted Duration of Therapy Intervention (Therapy Eval)  3 weeks  -    PT Plan Comments  Recheck scheduled 2/19/19.  -SS      User Key  (r) = Recorded By, (t) = Taken By, (c) = Cosigned By    Initials Name Provider Type     Alfonzo Maxwell, PT DPT Physical Therapist              Exercises     Row Name 02/14/19 0800          Subjective Comments  Did well with her last treatment session. Pain is overall doing good. Pain worse some days than others.   -SS          Able to rate subjective pain?  yes  -SS    Pre-Treatment Pain Level  4  -SS    Post-Treatment Pain Level  4  -SS          Exercise Name 1  Pro2, Seat 7, U/LE, F/B  -SS    Cueing 1  Verbal  -SS    Time 1  10 mins  -SS    Additional Comments  Level 3  -SS          Exercise Name 2  AROM shoulder  -SS    Cueing 2  Verbal  -SS    Sets 2  2  -SS    Reps 2  25  -SS          Exercise Name 3  B shoulder scaption in comfortable ROM  -SS    Cueing 3  Verbal;Demo  -SS    Reps 3  12  -SS          Exercise Name 4  B shoulder horizontal abd/add at ~ 70 deg and elbows flexed to 90  -SS    Cueing 4  Verbal;Demo  -SS    Sets 4  1  -SS    Reps 4  10  -SS          Exercise Name 5  Pro/Sup wheel for R UE supination and shoulder ER  -SS    Cueing 5  Verbal;Demo  -SS    Reps 5  2  -SS    Time 5  15  -SS      User Key  (r) = Recorded By, (t) = Taken By, (c) = Cosigned By    Initials Name Provider Type     Alfonzo Maxwell, PT DPT Physical Therapist                         PT OP Goals     Row Name 02/14/19 0800          STG Date to Achieve  -- further STGs deferred  -SS          LTG Date to Achieve  02/21/19  -SS    LTG 1  pt will improve QuickDASH  by 10%  -    LTG 1 Progress  Ongoing  -    LTG 2  Standing R shoulder flexion AROM to be >/= 100   -    LTG 2 Progress  Met  -    LTG 3  Standing R shoulder abduction AROM to be >/= 75 degrees   -    LTG 3 Progress  Met  -          PT Goal Re-Cert Due Date  02/21/19  -      User Key  (r) = Recorded By, (t) = Taken By, (c) = Cosigned By    Initials Name Provider Type     Alfonzo Maxwell, PT DPT Physical Therapist          Therapy Education  Given: HEP  Program: Reinforced  How Provided: Verbal  Provided to: Patient  Level of Understanding: Verbalized              Time Calculation:   Start Time: 0846  Stop Time: 0922  Time Calculation (min): 36 min  Total Timed Code Minutes- PT: 36 minute(s)    Therapy Charges for Today     Code Description Service Date Service Provider Modifiers Qty    64874230959 HC PT THER PROC EA 15 MIN 2/14/2019 Alfonzo Maxwell, PT DPT GP 2                    Alfonzo Maxwell, PT, DPT, CHT  2/14/2019

## 2019-02-19 ENCOUNTER — HOSPITAL ENCOUNTER (OUTPATIENT)
Dept: PHYSICAL THERAPY | Facility: HOSPITAL | Age: 44
Setting detail: THERAPIES SERIES
Discharge: HOME | End: 2019-02-19

## 2019-02-19 DIAGNOSIS — M54.6 THORACIC SPINE PAIN: ICD-10-CM

## 2019-02-19 DIAGNOSIS — G89.29 CHRONIC RIGHT SHOULDER PAIN: Primary | ICD-10-CM

## 2019-02-19 DIAGNOSIS — M25.511 CHRONIC RIGHT SHOULDER PAIN: Primary | ICD-10-CM

## 2019-02-19 PROCEDURE — 97110 THERAPEUTIC EXERCISES: CPT

## 2019-02-19 PROCEDURE — 97140 MANUAL THERAPY 1/> REGIONS: CPT

## 2019-02-19 NOTE — THERAPY PROGRESS REPORT/RE-CERT
"    Outpatient Physical Therapy Ortho Progress Note  [facility]     Patient Name: Christal Holder  : 1975  MRN: [id number]  Today's Date: 2019      Visit Date: 2019  Attendance:  (medical necessity)  Subjective Improvement: \"getting there\"  Next MD Appt: TBD  Recert Date: 3/11/19         Patient Active Problem List   Diagnosis   • Suicide ideation   • Severe episode of recurrent major depressive disorder, without psychotic features (CMS/HCC)        Past Medical History:   Diagnosis Date   • Anxiety    • Atelectasis    • Chronic pain disorder    • Compression fracture of body of thoracic vertebra (CMS/HCC)     T6-T12   • COPD (chronic obstructive pulmonary disease) (CMS/HCC)     due to trauma   • Depression    • Diabetes mellitus (CMS/HCC)    • Glenoid fracture of shoulder    • Pneumothorax     due to rib fractures   • Rib fracture    • Scapular fracture         Past Surgical History:   Procedure Laterality Date   • CHOLECYSTECTOMY     • GLENOID OPEN REDUCTION INTERNAL FIXATION Right    • SCAPULA OPEN REDUCTION INTERNAL FIXATION     • TUBAL ABDOMINAL LIGATION         Visit Dx:     ICD-10-CM ICD-9-CM   1. Chronic right shoulder pain M25.511 719.41    G89.29 338.29   2. Thoracic spine pain M54.6 724.1           PT Ortho     Row Name 19 0900       Subjective Comments    Subjective Comments  Pt reports she is very stiff today. She notes her pain is better managed since the onset of rehab. She continues to have ongoing stiffness that impacts her ADL tolerance. She consents to tx. She notes she feels that she is approaching her maximum potential w/ therapy.   -MW       Posture/Observations    Posture/Observations Comments  The pt has significant guarding in her R UT and R biceps. Pt has multiple diffuse triggerpoints present throughout R shoulder/biceps.   -MW       Right Upper Ext    Rt Shoulder Abduction AROM  110 deg pre tx, 118 deg post tx  -MW    Rt Shoulder Flexion AROM  120 deg " pre tx, 128 deg post tx  -MW    Rt Shoulder External Rotation AROM  23 deg pre tx  -MW        Strength Right    # Reps  2  -MW    Right Rung  2  -MW    Right  Test 1  58.5  -MW    Right  Test 2  60  -MW     Strength Average Right  59.25  -MW      User Key  (r) = Recorded By, (t) = Taken By, (c) = Cosigned By    Initials Name Provider Type    MW Kelsy Hansen, PT Physical Therapist                            PT OP Goals     Row Name 02/19/19 0900          PT Short Term Goals    STG Date to Achieve  -- further STGs deferred  -MW        Long Term Goals    LTG Date to Achieve  03/12/19  -MW     LTG 1  pt will improve QuickDASH by 10%  -MW     LTG 1 Progress  Ongoing  -MW     LTG 2  Standing R shoulder flexion AROM to be >/= 100   -MW     LTG 2 Progress  Met  -MW     LTG 3  Standing R shoulder abduction AROM to be >/= 75 degrees   -MW     LTG 3 Progress  Met  -MW        Time Calculation    PT Goal Re-Cert Due Date  03/12/19  -MW       User Key  (r) = Recorded By, (t) = Taken By, (c) = Cosigned By    Initials Name Provider Type    MW Kelsy Hansen, PT Physical Therapist          PT Assessment/Plan     Row Name 02/19/19 1000          PT Assessment    Functional Limitations  Decreased safety during functional activities;Limitation in home management;Limitations in community activities;Performance in self-care ADL  -MW     Impairments  Joint mobility;Muscle strength;Pain;Posture;Range of motion  -MW     Assessment Comments  The pt has made significant progress since the onset of rehab. Her AROM has progressed significantly over the past month and her R  strength is w/in 5# of the L UE. Based on pt's report and known past rehab experiences it is my impression that the pt is reaching her maximum functional potential at this time. It is my impression she would benefit from this week and next week of therapy to finalize ROM gains and to promote HEP maintenance. Will plan to d/c at end of next week. Will  cont to progress pt's ROM and scap strength as tolerated for remainder of rehab.   -MW     Rehab Potential  Fair  -MW     Patient/caregiver participated in establishment of treatment plan and goals  Yes  -MW     Patient would benefit from skilled therapy intervention  Yes  -MW        PT Plan    PT Frequency  -- this week and next week  -MW     PT Plan Comments  Cont per POC. Address HEP next.   -MW       User Key  (r) = Recorded By, (t) = Taken By, (c) = Cosigned By    Initials Name Provider Type    Kelys Ayala, PT Physical Therapist            Exercises     Row Name 02/19/19 0900             Subjective Comments    Subjective Comments  Pt reports she is very stiff today. She notes her pain is better managed since the onset of rehab. She continues to have ongoing stiffness that impacts her ADL tolerance. She consents to tx. She notes she feels that she is approaching her maximum potential w/ therapy.   -MW         Subjective Pain    Able to rate subjective pain?  yes  -MW      Pre-Treatment Pain Level  4  -MW         Exercise 1    Exercise Name 1  Pro2, Seat 7, U/LE, F/B  -MW      Cueing 1  Verbal  -MW      Time 1  10 mins  -MW         Exercise 2    Exercise Name 2  AROM shoulder  -MW      Time 2  5 min  -MW        User Key  (r) = Recorded By, (t) = Taken By, (c) = Cosigned By    Initials Name Provider Type    Kelsy Ayala, PT Physical Therapist           Manual Rx (last 36 hours)      Manual Treatments     Row Name 02/19/19 0900             Manual Rx 1    Manual Rx 1 Location  R UT  -MW      Manual Rx 1 Type  AMP and  for relief  -MW      Manual Rx 1 Duration  6'  -MW         Manual Rx 2    Manual Rx 2 Location  R biceps/UT  -MW      Manual Rx 2 Type  MFR for improved tissue extensibility  -MW      Manual Rx 2 Duration  10'  -MW         Manual Rx 3    Manual Rx 3 Location  R shoulder  -MW      Manual Rx 3 Type  MFR-- unwinding w/ triggerpoint release  -MW      Manual Rx 3 Duration  8'  -MW         User Key  (r) = Recorded By, (t) = Taken By, (c) = Cosigned By    Initials Name Provider Type    MW Kelsy Hansen, PT Physical Therapist                      Quick DASH  Open a tight or new jar.: Unable  Do heavy household chores (e.g., wash walls, wash floors): Severe Difficulty  Carry a shopping bag or briefcase: Moderate Difficulty  Wash your back: Unable  Use a knife to cut food: Severe Difficulty  Recreational activities in which you take some force or impact through your arm, should or hand (e.g. golf, hammering, tennis, etc.): Unable  During the past week, to what extent has your arm, shoulder, or hand problem interfered with your normal social activites with family, friends, neighbors or groups?: Quite a bit  During the past week, were you limited in your work or other regular daily activities as a result of your arm, shoulder or hand problem?: Very limited  Arm, Shoulder, or hand pain: Moderate  Tingling (pins and needles) in your arm, shoulder, or hand: Moderate  During the past week, how much difficulty have you had sleeping because of the pain in your arm, shoulder or hand?: Severe Difficulty  Number of Questions Answered: 11  Quick DASH Score: 75         Time Calculation:         Start Time: 0930  Stop Time: 1012  Time Calculation (min): 42 min  PT Non-Billable Time (min): 3 min  Total Timed Code Minutes- PT: 39 minute(s)     Therapy Charges for Today     Code Description Service Date Service Provider Modifiers Qty    78761339796 HC PT MANUAL THERAPY EA 15 MIN 2/19/2019 Kelsy Hansen, PT GP 2    01056387622 HC PT THER PROC EA 15 MIN 2/19/2019 Kelsy Hansen PT GP 1                   Kelsy Hansen PT  2/19/2019

## 2019-02-21 ENCOUNTER — HOSPITAL ENCOUNTER (OUTPATIENT)
Dept: PHYSICAL THERAPY | Facility: HOSPITAL | Age: 44
Setting detail: THERAPIES SERIES
Discharge: HOME | End: 2019-02-21

## 2019-02-21 DIAGNOSIS — M54.6 THORACIC SPINE PAIN: ICD-10-CM

## 2019-02-21 DIAGNOSIS — M25.511 CHRONIC RIGHT SHOULDER PAIN: Primary | ICD-10-CM

## 2019-02-21 DIAGNOSIS — G89.29 CHRONIC RIGHT SHOULDER PAIN: Primary | ICD-10-CM

## 2019-02-21 PROCEDURE — 97140 MANUAL THERAPY 1/> REGIONS: CPT

## 2019-02-21 PROCEDURE — 97110 THERAPEUTIC EXERCISES: CPT

## 2019-02-21 NOTE — THERAPY TREATMENT NOTE
"    Outpatient Physical Therapy Ortho Treatment Note  [facility]     Patient Name: Christal Holder  : 1975  MRN: [id number]  Today's Date: 2019      Visit Date: 2019  Attendance:  (medical necessity)  Subjective Improvement: \"getting there\"  Next MD Appt: EMELINA  Recert Date: 3/11/19         Visit Dx:    ICD-10-CM ICD-9-CM   1. Chronic right shoulder pain M25.511 719.41    G89.29 338.29   2. Thoracic spine pain M54.6 724.1       Patient Active Problem List   Diagnosis   • Suicide ideation   • Severe episode of recurrent major depressive disorder, without psychotic features (CMS/HCC)        Past Medical History:   Diagnosis Date   • Anxiety    • Atelectasis    • Chronic pain disorder    • Compression fracture of body of thoracic vertebra (CMS/HCC)     T6-T12   • COPD (chronic obstructive pulmonary disease) (CMS/[facility])     due to trauma   • Depression    • Diabetes mellitus (CMS/[facility])    • Glenoid fracture of shoulder    • Pneumothorax     due to rib fractures   • Rib fracture    • Scapular fracture         Past Surgical History:   Procedure Laterality Date   • CHOLECYSTECTOMY     • GLENOID OPEN REDUCTION INTERNAL FIXATION Right    • SCAPULA OPEN REDUCTION INTERNAL FIXATION     • TUBAL ABDOMINAL LIGATION         PT Ortho     Row Name 19 0800       Subjective Comments    Subjective Comments  Pt reports she feels okay today. Notes she continues to be stiff. She consents to tx.   -MW       Posture/Observations    Posture/Observations Comments  pt in no acute distress, post ther ex pt experiencing thoracic spasm  -MW    Row Name 19 0900       Subjective Comments    Subjective Comments  Pt reports she is very stiff today. She notes her pain is better managed since the onset of rehab. She continues to have ongoing stiffness that impacts her ADL tolerance. She consents to tx. She notes she feels that she is approaching her maximum potential w/ therapy.   -MW       Posture/Observations "    Posture/Observations Comments  The pt has significant guarding in her R UT and R biceps. Pt has multiple diffuse triggerpoints present throughout R shoulder/biceps.   -MW       Right Upper Ext    Rt Shoulder Abduction AROM  110 deg pre tx, 118 deg post tx  -MW    Rt Shoulder Flexion AROM  120 deg pre tx, 128 deg post tx  -MW    Rt Shoulder External Rotation AROM  23 deg pre tx  -MW        Strength Right    # Reps  2  -MW    Right Rung  2  -MW    Right  Test 1  58.5  -MW    Right  Test 2  60  -MW     Strength Average Right  59.25  -MW      User Key  (r) = Recorded By, (t) = Taken By, (c) = Cosigned By    Initials Name Provider Type    Kelsy Ayala, PT Physical Therapist                      PT Assessment/Plan     Row Name 02/21/19 0800          PT Assessment    Functional Limitations  Decreased safety during functional activities;Limitation in home management;Limitations in community activities;Performance in self-care ADL  -MW     Impairments  Joint mobility;Muscle strength;Pain;Posture;Range of motion  -MW     Assessment Comments  The pt tolerated tx well today. She tolerated progressive ther ex for scap strengthening; however, she experienced mild thoracic spasm w/ this. Manual tx applied accordingly and the pt responded well. Will progress as tolerated.   -MW     Rehab Potential  Fair  -MW     Patient/caregiver participated in establishment of treatment plan and goals  Yes  -MW     Patient would benefit from skilled therapy intervention  Yes  -MW        PT Plan    PT Frequency  2x/week  -MW     Predicted Duration of Therapy Intervention (Therapy Eval)  through next week  -MW     PT Plan Comments  Cont developing HEP and after PT plan next. Cont per POC.   -MW       User Key  (r) = Recorded By, (t) = Taken By, (c) = Cosigned By    Initials Name Provider Type    Kelsy Ayala, PT Physical Therapist              Exercises     Row Name 02/21/19 0800             Subjective Comments     Subjective Comments  Pt reports she feels okay today. Notes she continues to be stiff. She consents to tx.   -MW         Subjective Pain    Able to rate subjective pain?  yes  -MW      Pre-Treatment Pain Level  5  -MW      Post-Treatment Pain Level  5  -MW         Exercise 1    Exercise Name 1  Pro2, Seat 7, U/LE, F/B  -MW      Cueing 1  Verbal  -MW      Time 1  10 mins  -MW      Additional Comments  L3  -MW         Exercise 2    Exercise Name 2  standing shoulder flex- 3#  -MW      Sets 2  2  -MW      Reps 2  10  -MW         Exercise 3    Exercise Name 3  standing shoulder ext  -MW      Sets 3  2  -MW      Reps 3  10  -MW      Additional Comments  3#  -MW         Exercise 4    Exercise Name 4  standing shoulder abd- 3#  -MW      Sets 4  2  -MW      Reps 4  10  -MW         Exercise 5    Exercise Name 5  standing bicep curls- 3#  -MW      Sets 5  2  -MW      Reps 5  10  -MW         Exercise 6    Exercise Name 6  standing tricep ext  -MW      Sets 6  2  -MW      Reps 6  10  -MW      Additional Comments  3 plates  -MW        User Key  (r) = Recorded By, (t) = Taken By, (c) = Cosigned By    Initials Name Provider Type    MW Kelsy Hansen, PT Physical Therapist                        Manual Rx (last 36 hours)      Manual Treatments     Row Name 02/21/19 1300             Manual Rx 1    Manual Rx 1 Location  thoracic paraspinals/scap stabilizers  -MW      Manual Rx 1 Type  MFR for spasm relief  -MW      Manual Rx 1 Duration  10'  -MW        User Key  (r) = Recorded By, (t) = Taken By, (c) = Cosigned By    Initials Name Provider Type    MW Kelsy Hansen, PT Physical Therapist          PT OP Goals     Row Name 02/21/19 0800          PT Short Term Goals    STG Date to Achieve  -- further STGs deferred  -MW        Long Term Goals    LTG Date to Achieve  03/12/19  -MW     LTG 1  pt will improve QuickDASH by 10%  -MW     LTG 1 Progress  Ongoing  -MW     LTG 2  Standing R shoulder flexion AROM to be >/= 100   -MW     LTG 2  Progress  Met  -MW     LTG 3  Standing R shoulder abduction AROM to be >/= 75 degrees   -MW     LTG 3 Progress  Met  -MW        Time Calculation    PT Goal Re-Cert Due Date  03/12/19  -MW       User Key  (r) = Recorded By, (t) = Taken By, (c) = Cosigned By    Initials Name Provider Type    MW Kelys Hansen, PT Physical Therapist                         Time Calculation:   Start Time: 0849  Stop Time: 0931  Time Calculation (min): 42 min  Total Timed Code Minutes- PT: 42 minute(s)  Therapy Suggested Charges     Code   Minutes Charges    None           Therapy Charges for Today     Code Description Service Date Service Provider Modifiers Qty    05737730418 HC PT THER PROC EA 15 MIN 2/21/2019 Kelsy Hansen, PT GP 2    85580523044 HC PT MANUAL THERAPY EA 15 MIN 2/21/2019 Kelsy Hansen, PT GP 1                    Kelsy Hansen, PT  2/21/2019

## 2019-02-25 ENCOUNTER — HOSPITAL ENCOUNTER (OUTPATIENT)
Dept: PHYSICAL THERAPY | Facility: HOSPITAL | Age: 44
Setting detail: THERAPIES SERIES
Discharge: HOME | End: 2019-02-25

## 2019-02-25 DIAGNOSIS — M25.511 CHRONIC RIGHT SHOULDER PAIN: Primary | ICD-10-CM

## 2019-02-25 DIAGNOSIS — M54.6 THORACIC SPINE PAIN: ICD-10-CM

## 2019-02-25 DIAGNOSIS — G89.29 CHRONIC RIGHT SHOULDER PAIN: Primary | ICD-10-CM

## 2019-02-25 PROCEDURE — 97110 THERAPEUTIC EXERCISES: CPT

## 2019-02-25 NOTE — THERAPY TREATMENT NOTE
"    Outpatient Physical Therapy Ortho Treatment Note  [facility]     Patient Name: Christal Holder  : 1975  MRN: [id number]  Today's Date: 2019      Visit Date: 2019  Attendance:  (medical necessity)  Subjective Improvement: \"getting there\"  Next MD Appt: EMELINA  Recert Date: 3/11/19         Visit Dx:    ICD-10-CM ICD-9-CM   1. Chronic right shoulder pain M25.511 719.41    G89.29 338.29   2. Thoracic spine pain M54.6 724.1       Patient Active Problem List   Diagnosis   • Suicide ideation   • Severe episode of recurrent major depressive disorder, without psychotic features (CMS/HCC)        Past Medical History:   Diagnosis Date   • Anxiety    • Atelectasis    • Chronic pain disorder    • Compression fracture of body of thoracic vertebra (CMS/HCC)     T6-T12   • COPD (chronic obstructive pulmonary disease) (CMS/HCC)     due to trauma   • Depression    • Diabetes mellitus (CMS/HCC)    • Glenoid fracture of shoulder    • Pneumothorax     due to rib fractures   • Rib fracture    • Scapular fracture         Past Surgical History:   Procedure Laterality Date   • CHOLECYSTECTOMY     • GLENOID OPEN REDUCTION INTERNAL FIXATION Right    • SCAPULA OPEN REDUCTION INTERNAL FIXATION     • TUBAL ABDOMINAL LIGATION         PT Ortho     Row Name 19 1100       Subjective Comments    Subjective Comments  The pt states she feels okay today. She notes she is stiff, but not as stiff as she thought she would be after last tx. She states she had family circumstances that are stressful right now and this may be affecting her stiffness. She consents to tx.   -MW       Posture/Observations    Posture/Observations Comments  Pt is in no acute distress  -      User Key  (r) = Recorded By, (t) = Taken By, (c) = Cosigned By    Initials Name Provider Type    Kelsy Ayala, PT Physical Therapist                      PT Assessment/Plan     Row Name 19 1400          PT Assessment    Functional " Limitations  Decreased safety during functional activities;Limitation in home management;Limitations in community activities;Performance in self-care ADL  -MW     Impairments  Joint mobility;Muscle strength;Pain;Posture;Range of motion  -MW     Assessment Comments  The pt tolerated tx well today. She has made good progress since the onset of rehab. Pt seemed more motivated during today's session than prior therapy bouts. Discussed this w/ pt and pt reporting she enjoys the feeling of being more involved in the gym activities. Discussed w/ pt the need for promotion of these types of exercise and activities upon d/c from therapy. Pt is scheduled for 1 more tx this week.   -MW     Rehab Potential  Fair  -MW     Patient/caregiver participated in establishment of treatment plan and goals  Yes  -MW     Patient would benefit from skilled therapy intervention  Yes  -MW        PT Plan    PT Plan Comments  D/c next visit. Possible set-up w/ [employer] trainers upon d/c. Encourage gym membership for HEP promotion.   -MW       User Key  (r) = Recorded By, (t) = Taken By, (c) = Cosigned By    Initials Name Provider Type    Kelsy Ayala, PT Physical Therapist          Modalities     Row Name 02/25/19 1100             Moist Heat    MH Applied  Yes  -MW      Location  R shoulder  -MW      Rx Minutes  10 mins  -MW      MH S/P Rx  Yes  -MW        User Key  (r) = Recorded By, (t) = Taken By, (c) = Cosigned By    Initials Name Provider Type    Kelsy Ayala, PT Physical Therapist          Exercises     Row Name 02/25/19 1100             Subjective Comments    Subjective Comments  The pt states she feels okay today. She notes she is stiff, but not as stiff as she thought she would be after last tx. She states she had family circumstances that are stressful right now and this may be affecting her stiffness. She consents to tx.   -MW         Subjective Pain    Able to rate subjective pain?  yes  -MW      Pre-Treatment Pain Level  5   -MW      Post-Treatment Pain Level  5  -MW         Exercise 1    Exercise Name 1  Pro2, Seat 7, U/LE, F/B  -MW      Cueing 1  Verbal  -MW      Time 1  10 mins  -MW      Additional Comments  L3  -MW         Exercise 2    Exercise Name 2  standing shoulder flex- 3#  -MW      Sets 2  2  -MW      Reps 2  10  -MW         Exercise 3    Exercise Name 3  farmer walks holding 10# each hand  -MW      Sets 3  2  -MW      Reps 3  135'  -MW         Exercise 4    Exercise Name 4  standing shoulder abd- 3#  -MW      Sets 4  2  -MW      Reps 4  10  -MW         Exercise 5    Exercise Name 5  standing bicep curls- 3#  -MW      Sets 5  2  -MW      Reps 5  10  -MW         Exercise 6    Exercise Name 6  standing tricep ext  -MW      Sets 6  2  -MW      Reps 6  10  -MW      Additional Comments  3 plates  -MW         Exercise 7    Exercise Name 7  standing ER eccentrics-- used opposite extremity for concentric motion and allowed indepndent eccentric lower  -MW      Sets 7  3  -MW      Reps 7  5  -MW      Additional Comments  1 plate  -MW         Exercise 8    Exercise Name 8  HEP/POC discussion  -MW      Time 8  3'  -MW        User Key  (r) = Recorded By, (t) = Taken By, (c) = Cosigned By    Initials Name Provider Type    MW Kelsy Hansen, PT Physical Therapist                         PT OP Goals     Row Name 02/25/19 0900          PT Short Term Goals    STG Date to Achieve  -- further STGs deferred  -MW        Long Term Goals    LTG Date to Achieve  03/12/19  -MW     LTG 1  pt will improve QuickDASH by 10%  -MW     LTG 1 Progress  Ongoing  -MW     LTG 2  Standing R shoulder flexion AROM to be >/= 100   -MW     LTG 2 Progress  Met  -MW     LTG 3  Standing R shoulder abduction AROM to be >/= 75 degrees   -MW     LTG 3 Progress  Met  -MW        Time Calculation    PT Goal Re-Cert Due Date  03/12/19  -MW       User Key  (r) = Recorded By, (t) = Taken By, (c) = Cosigned By    Initials Name Provider Type    MW Kelsy Hansen, PT Physical  Therapist                         Time Calculation:   Start Time: 0930  Stop Time: 1025  Time Calculation (min): 55 min  PT Non-Billable Time (min): 15 min  Total Timed Code Minutes- PT: 40 minute(s)  Therapy Suggested Charges     Code   Minutes Charges    None           Therapy Charges for Today     Code Description Service Date Service Provider Modifiers Qty    93976331225 HC PT THER PROC EA 15 MIN 2/25/2019 Kelsy Hansen, PT GP 3    04156222876 HC PT THER SUPP EA 15 MIN 2/25/2019 Kelsy Hansen, PT GP 1                    Kelsy Hansen, PT  2/25/2019

## 2019-02-27 ENCOUNTER — HOSPITAL ENCOUNTER (OUTPATIENT)
Dept: PHYSICAL THERAPY | Facility: HOSPITAL | Age: 44
Setting detail: THERAPIES SERIES
Discharge: HOME | End: 2019-02-27

## 2019-02-27 DIAGNOSIS — M25.511 CHRONIC RIGHT SHOULDER PAIN: ICD-10-CM

## 2019-02-27 DIAGNOSIS — M54.6 THORACIC SPINE PAIN: Primary | ICD-10-CM

## 2019-02-27 DIAGNOSIS — G89.29 CHRONIC RIGHT SHOULDER PAIN: ICD-10-CM

## 2019-02-27 PROCEDURE — 97110 THERAPEUTIC EXERCISES: CPT | Performed by: PHYSICAL THERAPIST

## 2019-02-27 NOTE — THERAPY DISCHARGE NOTE
"     Outpatient Physical Therapy Ortho Treatment Note/Discharge Summary  [facility]     Patient Name: Christal Holder  : 1975  MRN: [id number]  Today's Date: 2019      Visit Date: 2019  Attendance:   Subjective Improvement: \"getting there\"  Next MD Appt: TBD    Visit Dx:    ICD-10-CM ICD-9-CM   1. Thoracic spine pain M54.6 724.1   2. Chronic right shoulder pain M25.511 719.41    G89.29 338.29            Past Medical History:   Diagnosis Date   • Anxiety    • Atelectasis    • Chronic pain disorder    • Compression fracture of body of thoracic vertebra (CMS/HCC)     T6-T12   • COPD (chronic obstructive pulmonary disease) (CMS/HCC)     due to trauma   • Depression    • Diabetes mellitus (CMS/HCC)    • Glenoid fracture of shoulder    • Pneumothorax     due to rib fractures   • Rib fracture    • Scapular fracture         Past Surgical History:   Procedure Laterality Date   • CHOLECYSTECTOMY     • GLENOID OPEN REDUCTION INTERNAL FIXATION Right    • SCAPULA OPEN REDUCTION INTERNAL FIXATION     • TUBAL ABDOMINAL LIGATION                         PT Assessment/Plan     Row Name 19 1300          Functional Limitations  Limitation in home management;Limitations in community activities;Performance in self-care ADL  -SS    Impairments  Joint mobility;Muscle strength;Pain;Posture;Range of motion  -    Assessment Comments  Good effort with therapy this date. Patient demonstrates understanding of HEP. Patient was dispensed 2 month membership to [employer].  -SS    Rehab Potential  Fair  -SS    Patient/caregiver participated in establishment of treatment plan and goals  Yes  -SS    Patient would benefit from skilled therapy intervention  No  -SS          PT Plan Comments  D/C P.T.  -      User Key  (r) = Recorded By, (t) = Taken By, (c) = Cosigned By    Initials Name Provider Type    SS Alfonzo Maxwell, PT DPT Physical Therapist              Exercises     Row Name 19 1300 "          Subjective Comments  Overall, doing well. No complaints from last therapy session. Running late due to slow traffic.  -SS          Able to rate subjective pain?  yes  -SS    Pre-Treatment Pain Level  3  -SS    Post-Treatment Pain Level  3  -SS          Exercise Name 1  Pro2, Seat 7, U/LE, F/B  -SS    Cueing 1  Verbal  -SS    Time 1  10 mins  -SS    Additional Comments  Level 3.5  -SS          Exercise Name 2  DB biceps curls  -SS    Cueing 2  Verbal  -SS    Sets 2  3  -SS    Reps 2  10  -SS    Additional Comments  5# B  -SS          Exercise Name 3  DB standing shoulder flexion  -SS    Cueing 3  Verbal  -SS    Sets 3  3  -SS    Reps 3  10  -SS    Additional Comments  3# B  -SS          Exercise Name 4  DB standing shoulder abduction  -SS    Cueing 4  Verbal  -SS    Sets 4  3  -SS    Reps 4  10  -SS    Additional Comments  3# DB  -SS          Exercise Name 5  Incline chest press  -SS    Cueing 5  Verbal;Demo  -SS    Sets 5  3  -SS    Reps 5  10  -SS    Additional Comments  3# DB  -SS          Exercise Name 6  [employer] Cable Column standing ER eccentrics-- used opposite extremity for concentric motion and allowed independent eccentric lower  -SS    Cueing 6  Verbal  -SS    Sets 6  2  -SS    Reps 6  10  -SS    Additional Comments  5#  -SS          Exercise Name 7  [employer] Cable Column standing triceps extension  -SS    Cueing 7  Verbal;Demo  -SS    Sets 7  1  -SS    Reps 7  20  -SS    Additional Comments  5#  -SS      User Key  (r) = Recorded By, (t) = Taken By, (c) = Cosigned By    Initials Name Provider Type    Alfonzo Pat, PT DPT Physical Therapist                         PT OP Goals     Row Name 02/27/19 1300          STG Date to Achieve  -- further STGs deferred  -SS          LTG Date to Achieve  03/12/19  -SS    LTG 1  pt will improve QuickDASH by 10%  -SS    LTG 1 Progress Comments  not assessed this date  -SS    LTG 2  Standing R shoulder flexion AROM to be >/= 100   -SS    LTG 2 Progress  Met   -    LTG 3  Standing R shoulder abduction AROM to be >/= 75 degrees   -    LTG 3 Progress  Met  -      User Key  (r) = Recorded By, (t) = Taken By, (c) = Cosigned By    Initials Name Provider Type    Alfonzo Pat, PT DPT Physical Therapist          Therapy Education  Given: HEP  Program: Reinforced  How Provided: Verbal  Provided to: Patient  Level of Understanding: Verbalized              Time Calculation:   Start Time: 1310  Stop Time: 1345  Time Calculation (min): 35 min    Therapy Charges for Today     Code Description Service Date Service Provider Modifiers Qty    31619919731 HC PT THER PROC EA 15 MIN 2/27/2019 Alfonzo Maxwell, PT DPT GP 2                OP PT Discharge Summary  Date of Discharge: 02/27/19  Reason for Discharge: Independent  Outcomes Achieved: Patient able to partially achieve established goals  Discharge Destination: Home with home program      Alfonzo Maxwell, PT, DPT, CHT  2/27/2019

## 2019-10-31 ENCOUNTER — TRANSCRIBE ORDERS (OUTPATIENT)
Dept: PHYSICAL THERAPY | Facility: HOSPITAL | Age: 44
End: 2019-10-31

## 2019-10-31 DIAGNOSIS — M25.511 RIGHT SHOULDER PAIN, UNSPECIFIED CHRONICITY: Primary | ICD-10-CM

## 2019-10-31 DIAGNOSIS — G89.29 OTHER CHRONIC PAIN: ICD-10-CM

## 2019-10-31 DIAGNOSIS — M54.9 DORSALGIA: ICD-10-CM

## 2019-11-05 ENCOUNTER — HOSPITAL ENCOUNTER (OUTPATIENT)
Dept: PHYSICAL THERAPY | Facility: HOSPITAL | Age: 44
Setting detail: THERAPIES SERIES
Discharge: HOME | End: 2019-11-05

## 2019-11-05 DIAGNOSIS — G89.29 OTHER CHRONIC PAIN: ICD-10-CM

## 2019-11-05 DIAGNOSIS — M54.9 DORSALGIA, UNSPECIFIED: ICD-10-CM

## 2019-11-05 DIAGNOSIS — M25.511 RIGHT SHOULDER PAIN, UNSPECIFIED CHRONICITY: Primary | ICD-10-CM

## 2019-11-05 PROCEDURE — 97162 PT EVAL MOD COMPLEX 30 MIN: CPT | Performed by: PHYSICAL THERAPIST
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
## 2019-11-12 ENCOUNTER — APPOINTMENT (OUTPATIENT)
Dept: PHYSICAL THERAPY | Facility: HOSPITAL | Age: 44
End: 2019-11-12

## 2019-11-14 ENCOUNTER — HOSPITAL ENCOUNTER (OUTPATIENT)
Dept: PHYSICAL THERAPY | Facility: HOSPITAL | Age: 44
Setting detail: THERAPIES SERIES
Discharge: HOME | End: 2019-11-14

## 2019-11-14 DIAGNOSIS — G89.29 OTHER CHRONIC PAIN: ICD-10-CM

## 2019-11-14 DIAGNOSIS — M54.9 DORSALGIA, UNSPECIFIED: ICD-10-CM

## 2019-11-14 DIAGNOSIS — M25.511 RIGHT SHOULDER PAIN, UNSPECIFIED CHRONICITY: Primary | ICD-10-CM

## 2019-11-14 PROCEDURE — 97140 MANUAL THERAPY 1/> REGIONS: CPT | Performed by: PHYSICAL THERAPIST
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
## 2019-11-19 ENCOUNTER — HOSPITAL ENCOUNTER (OUTPATIENT)
Dept: PHYSICAL THERAPY | Facility: HOSPITAL | Age: 44
Setting detail: THERAPIES SERIES
Discharge: HOME | End: 2019-11-19

## 2019-11-19 DIAGNOSIS — M54.9 DORSALGIA, UNSPECIFIED: ICD-10-CM

## 2019-11-19 DIAGNOSIS — M54.6 THORACIC SPINE PAIN: ICD-10-CM

## 2019-11-19 DIAGNOSIS — G89.29 OTHER CHRONIC PAIN: ICD-10-CM

## 2019-11-19 DIAGNOSIS — M25.511 RIGHT SHOULDER PAIN, UNSPECIFIED CHRONICITY: Primary | ICD-10-CM

## 2019-11-19 PROCEDURE — 97140 MANUAL THERAPY 1/> REGIONS: CPT | Performed by: PHYSICAL THERAPIST
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
## 2019-11-21 ENCOUNTER — HOSPITAL ENCOUNTER (OUTPATIENT)
Dept: PHYSICAL THERAPY | Facility: HOSPITAL | Age: 44
Setting detail: THERAPIES SERIES
Discharge: HOME | End: 2019-11-21

## 2019-11-21 DIAGNOSIS — M25.511 RIGHT SHOULDER PAIN, UNSPECIFIED CHRONICITY: Primary | ICD-10-CM

## 2019-11-21 PROCEDURE — 97140 MANUAL THERAPY 1/> REGIONS: CPT | Performed by: PHYSICAL THERAPIST
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
## 2019-11-25 ENCOUNTER — APPOINTMENT (OUTPATIENT)
Dept: PHYSICAL THERAPY | Facility: HOSPITAL | Age: 44
End: 2019-11-25

## 2019-11-26 ENCOUNTER — HOSPITAL ENCOUNTER (OUTPATIENT)
Dept: PHYSICAL THERAPY | Facility: HOSPITAL | Age: 44
Setting detail: THERAPIES SERIES
Discharge: HOME | End: 2019-11-26

## 2019-11-26 DIAGNOSIS — M54.6 THORACIC SPINE PAIN: ICD-10-CM

## 2019-11-26 DIAGNOSIS — G89.29 OTHER CHRONIC PAIN: ICD-10-CM

## 2019-11-26 DIAGNOSIS — M54.9 DORSALGIA, UNSPECIFIED: ICD-10-CM

## 2019-11-26 DIAGNOSIS — M25.511 RIGHT SHOULDER PAIN, UNSPECIFIED CHRONICITY: Primary | ICD-10-CM

## 2019-11-26 PROCEDURE — 97110 THERAPEUTIC EXERCISES: CPT | Performed by: PHYSICAL THERAPIST
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
## 2019-12-02 ENCOUNTER — HOSPITAL ENCOUNTER (OUTPATIENT)
Dept: PHYSICAL THERAPY | Facility: HOSPITAL | Age: 44
Setting detail: THERAPIES SERIES
Discharge: HOME | End: 2019-12-02

## 2019-12-02 DIAGNOSIS — G89.29 OTHER CHRONIC PAIN: ICD-10-CM

## 2019-12-02 DIAGNOSIS — M25.511 RIGHT SHOULDER PAIN, UNSPECIFIED CHRONICITY: Primary | ICD-10-CM

## 2019-12-02 PROCEDURE — 97110 THERAPEUTIC EXERCISES: CPT | Performed by: PHYSICAL THERAPIST

## 2019-12-02 PROCEDURE — 97140 MANUAL THERAPY 1/> REGIONS: CPT | Performed by: PHYSICAL THERAPIST
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
## 2019-12-04 ENCOUNTER — HOSPITAL ENCOUNTER (OUTPATIENT)
Dept: PHYSICAL THERAPY | Facility: HOSPITAL | Age: 44
Setting detail: THERAPIES SERIES
Discharge: HOME | End: 2019-12-04

## 2019-12-04 DIAGNOSIS — G89.29 OTHER CHRONIC PAIN: ICD-10-CM

## 2019-12-04 DIAGNOSIS — M25.511 RIGHT SHOULDER PAIN, UNSPECIFIED CHRONICITY: Primary | ICD-10-CM

## 2019-12-04 DIAGNOSIS — M54.6 THORACIC SPINE PAIN: ICD-10-CM

## 2019-12-04 PROCEDURE — 97140 MANUAL THERAPY 1/> REGIONS: CPT | Performed by: PHYSICAL THERAPIST

## 2019-12-04 PROCEDURE — 97110 THERAPEUTIC EXERCISES: CPT | Performed by: PHYSICAL THERAPIST
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
## 2019-12-09 ENCOUNTER — HOSPITAL ENCOUNTER (OUTPATIENT)
Dept: PHYSICAL THERAPY | Facility: HOSPITAL | Age: 44
Setting detail: THERAPIES SERIES
Discharge: HOME | End: 2019-12-09

## 2019-12-09 DIAGNOSIS — M54.6 THORACIC SPINE PAIN: ICD-10-CM

## 2019-12-09 DIAGNOSIS — M25.511 RIGHT SHOULDER PAIN, UNSPECIFIED CHRONICITY: Primary | ICD-10-CM

## 2019-12-09 DIAGNOSIS — G89.29 OTHER CHRONIC PAIN: ICD-10-CM

## 2019-12-09 PROCEDURE — 97140 MANUAL THERAPY 1/> REGIONS: CPT | Performed by: PHYSICAL THERAPIST
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
## 2019-12-11 ENCOUNTER — HOSPITAL ENCOUNTER (OUTPATIENT)
Dept: PHYSICAL THERAPY | Facility: HOSPITAL | Age: 44
Setting detail: THERAPIES SERIES
Discharge: HOME | End: 2019-12-11

## 2019-12-11 DIAGNOSIS — G89.29 OTHER CHRONIC PAIN: ICD-10-CM

## 2019-12-11 DIAGNOSIS — M54.6 THORACIC SPINE PAIN: ICD-10-CM

## 2019-12-11 DIAGNOSIS — M25.511 RIGHT SHOULDER PAIN, UNSPECIFIED CHRONICITY: Primary | ICD-10-CM

## 2019-12-11 PROCEDURE — 97140 MANUAL THERAPY 1/> REGIONS: CPT | Performed by: PHYSICAL THERAPIST

## 2019-12-11 PROCEDURE — 97110 THERAPEUTIC EXERCISES: CPT | Performed by: PHYSICAL THERAPIST
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
## 2019-12-16 ENCOUNTER — HOSPITAL ENCOUNTER (OUTPATIENT)
Dept: PHYSICAL THERAPY | Facility: HOSPITAL | Age: 44
Setting detail: THERAPIES SERIES
Discharge: HOME | End: 2019-12-16

## 2019-12-16 DIAGNOSIS — M54.9 DORSALGIA, UNSPECIFIED: ICD-10-CM

## 2019-12-16 DIAGNOSIS — M54.6 THORACIC SPINE PAIN: ICD-10-CM

## 2019-12-16 DIAGNOSIS — M25.511 RIGHT SHOULDER PAIN, UNSPECIFIED CHRONICITY: Primary | ICD-10-CM

## 2019-12-16 DIAGNOSIS — G89.29 OTHER CHRONIC PAIN: ICD-10-CM

## 2019-12-16 PROCEDURE — 97140 MANUAL THERAPY 1/> REGIONS: CPT | Performed by: PHYSICAL THERAPIST
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
## 2019-12-18 ENCOUNTER — HOSPITAL ENCOUNTER (OUTPATIENT)
Dept: PHYSICAL THERAPY | Facility: HOSPITAL | Age: 44
Setting detail: THERAPIES SERIES
Discharge: HOME | End: 2019-12-18

## 2019-12-18 DIAGNOSIS — M25.511 RIGHT SHOULDER PAIN, UNSPECIFIED CHRONICITY: Primary | ICD-10-CM

## 2019-12-18 DIAGNOSIS — G89.29 OTHER CHRONIC PAIN: ICD-10-CM

## 2019-12-18 DIAGNOSIS — M54.9 DORSALGIA, UNSPECIFIED: ICD-10-CM

## 2019-12-18 PROCEDURE — 97140 MANUAL THERAPY 1/> REGIONS: CPT | Performed by: PHYSICAL THERAPIST
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
## 2019-12-30 ENCOUNTER — APPOINTMENT (OUTPATIENT)
Dept: PHYSICAL THERAPY | Facility: HOSPITAL | Age: 44
End: 2019-12-30

## 2020-01-02 ENCOUNTER — HOSPITAL ENCOUNTER (OUTPATIENT)
Dept: PHYSICAL THERAPY | Facility: HOSPITAL | Age: 45
Setting detail: THERAPIES SERIES
Discharge: HOME | End: 2020-01-02

## 2020-01-02 DIAGNOSIS — M25.511 RIGHT SHOULDER PAIN, UNSPECIFIED CHRONICITY: Primary | ICD-10-CM

## 2020-01-02 DIAGNOSIS — G89.29 OTHER CHRONIC PAIN: ICD-10-CM

## 2020-01-02 DIAGNOSIS — M54.9 DORSALGIA, UNSPECIFIED: ICD-10-CM

## 2020-01-02 DIAGNOSIS — M54.6 THORACIC SPINE PAIN: ICD-10-CM

## 2020-01-02 PROCEDURE — 97140 MANUAL THERAPY 1/> REGIONS: CPT | Performed by: PHYSICAL THERAPIST
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
## 2020-01-09 ENCOUNTER — HOSPITAL ENCOUNTER (OUTPATIENT)
Dept: PHYSICAL THERAPY | Facility: HOSPITAL | Age: 45
Setting detail: THERAPIES SERIES
Discharge: HOME | End: 2020-01-09

## 2020-01-09 DIAGNOSIS — M54.9 DORSALGIA, UNSPECIFIED: ICD-10-CM

## 2020-01-09 DIAGNOSIS — G89.29 OTHER CHRONIC PAIN: ICD-10-CM

## 2020-01-09 DIAGNOSIS — M25.511 RIGHT SHOULDER PAIN, UNSPECIFIED CHRONICITY: Primary | ICD-10-CM

## 2020-01-09 PROCEDURE — 97140 MANUAL THERAPY 1/> REGIONS: CPT | Performed by: PHYSICAL THERAPIST
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
## 2020-01-13 ENCOUNTER — HOSPITAL ENCOUNTER (OUTPATIENT)
Dept: PHYSICAL THERAPY | Facility: HOSPITAL | Age: 45
Setting detail: THERAPIES SERIES
Discharge: HOME | End: 2020-01-13

## 2020-01-13 DIAGNOSIS — M54.6 THORACIC SPINE PAIN: ICD-10-CM

## 2020-01-13 DIAGNOSIS — M54.9 DORSALGIA, UNSPECIFIED: ICD-10-CM

## 2020-01-13 DIAGNOSIS — M25.511 RIGHT SHOULDER PAIN, UNSPECIFIED CHRONICITY: ICD-10-CM

## 2020-01-13 DIAGNOSIS — G89.29 OTHER CHRONIC PAIN: Primary | ICD-10-CM

## 2020-01-13 PROCEDURE — 97110 THERAPEUTIC EXERCISES: CPT | Performed by: PHYSICAL THERAPIST

## 2020-01-13 PROCEDURE — 97140 MANUAL THERAPY 1/> REGIONS: CPT | Performed by: PHYSICAL THERAPIST
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
## 2020-01-15 ENCOUNTER — HOSPITAL ENCOUNTER (OUTPATIENT)
Dept: PHYSICAL THERAPY | Facility: HOSPITAL | Age: 45
Setting detail: THERAPIES SERIES
Discharge: HOME | End: 2020-01-15

## 2020-01-15 DIAGNOSIS — M54.9 DORSALGIA, UNSPECIFIED: ICD-10-CM

## 2020-01-15 DIAGNOSIS — M25.511 RIGHT SHOULDER PAIN, UNSPECIFIED CHRONICITY: ICD-10-CM

## 2020-01-15 DIAGNOSIS — G89.29 OTHER CHRONIC PAIN: Primary | ICD-10-CM

## 2020-01-15 DIAGNOSIS — M54.6 THORACIC SPINE PAIN: ICD-10-CM

## 2020-01-15 PROCEDURE — 97140 MANUAL THERAPY 1/> REGIONS: CPT | Performed by: PHYSICAL THERAPIST

## 2020-01-15 PROCEDURE — 97110 THERAPEUTIC EXERCISES: CPT | Performed by: PHYSICAL THERAPIST
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
## 2020-01-20 ENCOUNTER — HOSPITAL ENCOUNTER (OUTPATIENT)
Dept: PHYSICAL THERAPY | Facility: HOSPITAL | Age: 45
Setting detail: THERAPIES SERIES
Discharge: HOME | End: 2020-01-20

## 2020-01-20 DIAGNOSIS — G89.29 OTHER CHRONIC PAIN: Primary | ICD-10-CM

## 2020-01-20 DIAGNOSIS — M25.511 RIGHT SHOULDER PAIN, UNSPECIFIED CHRONICITY: ICD-10-CM

## 2020-01-20 DIAGNOSIS — M54.6 THORACIC SPINE PAIN: ICD-10-CM

## 2020-01-20 DIAGNOSIS — M54.9 DORSALGIA, UNSPECIFIED: ICD-10-CM

## 2020-01-20 PROCEDURE — 97110 THERAPEUTIC EXERCISES: CPT | Performed by: PHYSICAL THERAPIST
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
## 2020-01-22 ENCOUNTER — APPOINTMENT (OUTPATIENT)
Dept: PHYSICAL THERAPY | Facility: HOSPITAL | Age: 45
End: 2020-01-22

## 2020-01-28 ENCOUNTER — HOSPITAL ENCOUNTER (OUTPATIENT)
Dept: PHYSICAL THERAPY | Facility: HOSPITAL | Age: 45
Setting detail: THERAPIES SERIES
Discharge: HOME | End: 2020-01-28

## 2020-01-28 DIAGNOSIS — G89.29 OTHER CHRONIC PAIN: ICD-10-CM

## 2020-01-28 DIAGNOSIS — M54.9 DORSALGIA, UNSPECIFIED: ICD-10-CM

## 2020-01-28 DIAGNOSIS — M25.511 RIGHT SHOULDER PAIN, UNSPECIFIED CHRONICITY: Primary | ICD-10-CM

## 2020-01-28 PROCEDURE — 97110 THERAPEUTIC EXERCISES: CPT | Performed by: PHYSICAL THERAPIST
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
## 2020-01-30 ENCOUNTER — HOSPITAL ENCOUNTER (OUTPATIENT)
Dept: PHYSICAL THERAPY | Facility: HOSPITAL | Age: 45
Setting detail: THERAPIES SERIES
Discharge: HOME | End: 2020-01-30

## 2020-01-30 DIAGNOSIS — M25.511 RIGHT SHOULDER PAIN, UNSPECIFIED CHRONICITY: Primary | ICD-10-CM

## 2020-01-30 DIAGNOSIS — M54.9 DORSALGIA, UNSPECIFIED: ICD-10-CM

## 2020-01-30 DIAGNOSIS — G89.29 OTHER CHRONIC PAIN: ICD-10-CM

## 2020-01-30 PROCEDURE — 97110 THERAPEUTIC EXERCISES: CPT | Performed by: PHYSICAL THERAPIST
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
## 2020-02-03 ENCOUNTER — HOSPITAL ENCOUNTER (OUTPATIENT)
Dept: PHYSICAL THERAPY | Facility: HOSPITAL | Age: 45
Setting detail: THERAPIES SERIES
Discharge: HOME | End: 2020-02-03

## 2020-02-03 DIAGNOSIS — M54.9 DORSALGIA, UNSPECIFIED: ICD-10-CM

## 2020-02-03 DIAGNOSIS — M25.511 RIGHT SHOULDER PAIN, UNSPECIFIED CHRONICITY: Primary | ICD-10-CM

## 2020-02-03 DIAGNOSIS — G89.29 OTHER CHRONIC PAIN: ICD-10-CM

## 2020-02-03 PROCEDURE — 97110 THERAPEUTIC EXERCISES: CPT | Performed by: PHYSICAL THERAPIST
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
## 2020-02-06 ENCOUNTER — HOSPITAL ENCOUNTER (OUTPATIENT)
Dept: PHYSICAL THERAPY | Facility: HOSPITAL | Age: 45
Setting detail: THERAPIES SERIES
Discharge: HOME | End: 2020-02-06

## 2020-02-06 DIAGNOSIS — G89.29 OTHER CHRONIC PAIN: ICD-10-CM

## 2020-02-06 DIAGNOSIS — M25.511 RIGHT SHOULDER PAIN, UNSPECIFIED CHRONICITY: Primary | ICD-10-CM

## 2020-02-06 DIAGNOSIS — M54.9 DORSALGIA, UNSPECIFIED: ICD-10-CM

## 2020-02-06 PROCEDURE — 97110 THERAPEUTIC EXERCISES: CPT | Performed by: PHYSICAL THERAPIST
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
## 2020-02-17 ENCOUNTER — APPOINTMENT (OUTPATIENT)
Dept: PHYSICAL THERAPY | Facility: HOSPITAL | Age: 45
End: 2020-02-17

## 2020-02-19 ENCOUNTER — HOSPITAL ENCOUNTER (OUTPATIENT)
Dept: PHYSICAL THERAPY | Facility: HOSPITAL | Age: 45
Setting detail: THERAPIES SERIES
Discharge: HOME | End: 2020-02-19

## 2020-02-19 DIAGNOSIS — G89.29 OTHER CHRONIC PAIN: ICD-10-CM

## 2020-02-19 DIAGNOSIS — M25.511 RIGHT SHOULDER PAIN, UNSPECIFIED CHRONICITY: Primary | ICD-10-CM

## 2020-02-19 DIAGNOSIS — M54.9 DORSALGIA, UNSPECIFIED: ICD-10-CM

## 2020-02-19 PROCEDURE — 97140 MANUAL THERAPY 1/> REGIONS: CPT | Performed by: PHYSICAL THERAPIST
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
## 2020-02-24 ENCOUNTER — HOSPITAL ENCOUNTER (OUTPATIENT)
Dept: PHYSICAL THERAPY | Facility: HOSPITAL | Age: 45
Setting detail: THERAPIES SERIES
Discharge: HOME | End: 2020-02-24

## 2020-02-24 DIAGNOSIS — M25.511 RIGHT SHOULDER PAIN, UNSPECIFIED CHRONICITY: Primary | ICD-10-CM

## 2020-02-24 DIAGNOSIS — G89.29 OTHER CHRONIC PAIN: ICD-10-CM

## 2020-02-24 DIAGNOSIS — M54.9 DORSALGIA, UNSPECIFIED: ICD-10-CM

## 2020-02-24 PROCEDURE — 97140 MANUAL THERAPY 1/> REGIONS: CPT | Performed by: PHYSICAL THERAPIST

## 2020-02-24 PROCEDURE — 97110 THERAPEUTIC EXERCISES: CPT | Performed by: PHYSICAL THERAPIST
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
## 2020-02-26 ENCOUNTER — HOSPITAL ENCOUNTER (OUTPATIENT)
Dept: PHYSICAL THERAPY | Facility: HOSPITAL | Age: 45
Setting detail: THERAPIES SERIES
Discharge: HOME | End: 2020-02-26

## 2020-02-26 DIAGNOSIS — M54.9 DORSALGIA, UNSPECIFIED: ICD-10-CM

## 2020-02-26 DIAGNOSIS — G89.29 OTHER CHRONIC PAIN: ICD-10-CM

## 2020-02-26 DIAGNOSIS — M25.511 RIGHT SHOULDER PAIN, UNSPECIFIED CHRONICITY: Primary | ICD-10-CM

## 2020-02-26 PROCEDURE — 97110 THERAPEUTIC EXERCISES: CPT | Performed by: PHYSICAL THERAPIST
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
## 2020-03-02 ENCOUNTER — HOSPITAL ENCOUNTER (OUTPATIENT)
Dept: PHYSICAL THERAPY | Facility: HOSPITAL | Age: 45
Setting detail: THERAPIES SERIES
Discharge: HOME | End: 2020-03-02

## 2020-03-02 DIAGNOSIS — M25.511 RIGHT SHOULDER PAIN, UNSPECIFIED CHRONICITY: Primary | ICD-10-CM

## 2020-03-02 DIAGNOSIS — M54.9 DORSALGIA, UNSPECIFIED: ICD-10-CM

## 2020-03-02 DIAGNOSIS — G89.29 OTHER CHRONIC PAIN: ICD-10-CM

## 2020-03-02 PROCEDURE — 97110 THERAPEUTIC EXERCISES: CPT | Performed by: PHYSICAL THERAPIST
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
## 2020-03-05 ENCOUNTER — HOSPITAL ENCOUNTER (OUTPATIENT)
Dept: PHYSICAL THERAPY | Facility: HOSPITAL | Age: 45
Setting detail: THERAPIES SERIES
Discharge: HOME | End: 2020-03-05

## 2020-03-05 DIAGNOSIS — M54.9 DORSALGIA, UNSPECIFIED: ICD-10-CM

## 2020-03-05 DIAGNOSIS — G89.29 OTHER CHRONIC PAIN: ICD-10-CM

## 2020-03-05 DIAGNOSIS — M25.511 RIGHT SHOULDER PAIN, UNSPECIFIED CHRONICITY: Primary | ICD-10-CM

## 2020-03-05 PROCEDURE — 97110 THERAPEUTIC EXERCISES: CPT | Performed by: PHYSICAL THERAPIST
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
## 2020-03-10 ENCOUNTER — HOSPITAL ENCOUNTER (OUTPATIENT)
Dept: PHYSICAL THERAPY | Facility: HOSPITAL | Age: 45
Setting detail: THERAPIES SERIES
Discharge: HOME | End: 2020-03-10

## 2020-03-10 DIAGNOSIS — M25.511 RIGHT SHOULDER PAIN, UNSPECIFIED CHRONICITY: Primary | ICD-10-CM

## 2020-03-10 DIAGNOSIS — M54.9 DORSALGIA, UNSPECIFIED: ICD-10-CM

## 2020-03-10 DIAGNOSIS — G89.29 OTHER CHRONIC PAIN: ICD-10-CM

## 2020-03-10 PROCEDURE — 97110 THERAPEUTIC EXERCISES: CPT | Performed by: PHYSICAL THERAPIST
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
## 2020-03-12 ENCOUNTER — APPOINTMENT (OUTPATIENT)
Dept: PHYSICAL THERAPY | Facility: HOSPITAL | Age: 45
End: 2020-03-12

## 2020-10-21 ENCOUNTER — TRANSCRIBE ORDERS (OUTPATIENT)
Dept: PHYSICAL THERAPY | Facility: HOSPITAL | Age: 45
End: 2020-10-21

## 2020-10-21 DIAGNOSIS — M25.511 CHRONIC RIGHT SHOULDER PAIN: Primary | ICD-10-CM

## 2020-10-21 DIAGNOSIS — G89.29 CHRONIC RIGHT SHOULDER PAIN: Primary | ICD-10-CM

## 2020-10-27 ENCOUNTER — HOSPITAL ENCOUNTER (OUTPATIENT)
Dept: PHYSICAL THERAPY | Facility: HOSPITAL | Age: 45
Setting detail: THERAPIES SERIES
Discharge: HOME | End: 2020-10-27

## 2020-10-27 DIAGNOSIS — G89.29 CHRONIC RIGHT SHOULDER PAIN: Primary | ICD-10-CM

## 2020-10-27 DIAGNOSIS — M54.9 MID BACK PAIN: ICD-10-CM

## 2020-10-27 DIAGNOSIS — M25.511 CHRONIC RIGHT SHOULDER PAIN: Primary | ICD-10-CM

## 2020-10-27 PROCEDURE — 97162 PT EVAL MOD COMPLEX 30 MIN: CPT | Performed by: PHYSICAL THERAPIST
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
## 2020-11-02 ENCOUNTER — HOSPITAL ENCOUNTER (OUTPATIENT)
Dept: PHYSICAL THERAPY | Facility: HOSPITAL | Age: 45
Setting detail: THERAPIES SERIES
Discharge: HOME | End: 2020-11-02

## 2020-11-02 DIAGNOSIS — G89.29 CHRONIC RIGHT SHOULDER PAIN: Primary | ICD-10-CM

## 2020-11-02 DIAGNOSIS — M54.9 MID BACK PAIN: ICD-10-CM

## 2020-11-02 DIAGNOSIS — M25.511 CHRONIC RIGHT SHOULDER PAIN: Primary | ICD-10-CM

## 2020-11-02 PROCEDURE — 97110 THERAPEUTIC EXERCISES: CPT | Performed by: PHYSICAL THERAPIST

## 2020-11-02 NOTE — THERAPY TREATMENT NOTE
"    Outpatient Physical Therapy Ortho Treatment Note  [facility]     Patient Name: Christal Holder  : 1975  MRN: [id number]  Today's Date: 2020      Visit Date: 2020  Attendance: 2/2 (45/yr on primary)  Subjective Improvement: 0%  Next MD Appt: PRN  Recert Date: 2020     Therapy Diagnosis: chronic R shoulder pain and immobility s/p UT accident     Visit Dx:    ICD-10-CM ICD-9-CM   1. Chronic right shoulder pain  M25.511 719.41    G89.29 338.29   2. Mid back pain  M54.9 724.5            Past Medical History:   Diagnosis Date   • Anxiety    • Atelectasis    • Chronic pain disorder    • Compression fracture of body of thoracic vertebra (CMS/HCC)     T6-T12   • COPD (chronic obstructive pulmonary disease) (CMS/HCC)     due to trauma   • Depression    • Diabetes mellitus (CMS/HCC)    • Glenoid fracture of shoulder    • Pneumothorax     due to rib fractures   • Rib fracture    • Scapular fracture    • Severe episode of recurrent major depressive disorder, without psychotic features (CMS/HCC)         Past Surgical History:   Procedure Laterality Date   • CHOLECYSTECTOMY     • GLENOID OPEN REDUCTION INTERNAL FIXATION Right    • SCAPULA OPEN REDUCTION INTERNAL FIXATION     • TUBAL ABDOMINAL LIGATION         PT Ortho     Row Name 20       Subjective Comments    Subjective Comments  \"The cold weather is not liking me.\" Pain in the back and R shoulder. 0% subjective improvement.  -SS       Subjective Pain    Able to rate subjective pain?  yes  -SS    Pre-Treatment Pain Level  6  -SS    Post-Treatment Pain Level  4  -SS       Right Upper Ext    Rt Shoulder Abduction PROM  90 deg; end-range pain  -SS    Rt Shoulder Flexion PROM  90 deg; end-range pain  -SS      User Key  (r) = Recorded By, (t) = Taken By, (c) = Cosigned By    Initials Name Provider Type    Alfonzo Pat, PT DPT Physical Therapist            PT Assessment/Plan     Row Name 20          PT " "Assessment    Functional Limitations  Limitation in home management;Limitations in community activities;Limitations in functional capacity and performance;Performance in leisure activities;Performance in self-care ADL;Performance in work activities  -     Impairments  Pain;Range of motion;Motor function;Muscle strength  -     Assessment Comments  Pain improved with MHP. Passive flexion and abduction to 90 deg in supine this date. Patient approximately 10 minutes late this date.  -     Rehab Potential  Fair barrier: chronicity, suprascapular nerve injury  -     Patient/caregiver participated in establishment of treatment plan and goals  Yes  -SS     Patient would benefit from skilled therapy intervention  Yes  -SS        PT Plan    PT Frequency  2x/week  -     Predicted Duration of Therapy Intervention (PT)  8-10 weeks  -     PT Plan Comments  Continue POC. Cervical distraction next.  -       User Key  (r) = Recorded By, (t) = Taken By, (c) = Cosigned By    Initials Name Provider Type    Alfonzo Pat, PT DPT Physical Therapist          Modalities     Row Name 11/02/20 0800             Moist Heat    MH Applied  Yes  -      Location  R shoulder and trunk  -      Rx Minutes  15 mins  -SS      MH Prior to Rx  No  -SS      MH S/P Rx  Yes  -SS        User Key  (r) = Recorded By, (t) = Taken By, (c) = Cosigned By    Initials Name Provider Type    Alfonzo Pat, PT DPT Physical Therapist        OP Exercises     Row Name 11/02/20 0800             Subjective Comments    Subjective Comments  \"The cold weather is not liking me.\" Pain in the back and R shoulder. 0% subjective improvement.  -         Subjective Pain    Able to rate subjective pain?  yes  -      Pre-Treatment Pain Level  6  -SS      Post-Treatment Pain Level  4  -         Exercise 1    Exercise Name 1  Wand shoulder AA flex in supine  -      Cueing 1  Verbal  -      Sets 1  1  -SS      Reps 1  15  -SS         " Exercise 2    Exercise Name 2  AAROM shoulder abduction in supine  -SS      Cueing 2  Verbal;Tactile  -SS      Sets 2  1  -SS      Reps 2  20  -SS      Additional Comments  manual assist  -SS         Exercise 3    Exercise Name 3  AAROM shoulder flexion in supine  -SS      Cueing 3  Verbal;Tactile  -SS      Sets 3  1  -SS      Reps 3  15  -SS      Additional Comments  manual assist  -SS         Exercise 4    Exercise Name 4  Supine active shoulder flexion  -SS      Cueing 4  Verbal  -SS      Sets 4  1  -SS      Reps 4  10  -SS         Exercise 5    Exercise Name 5  Supine PROM shoulder ER/IR in 25 deg of abduction  -SS      Cueing 5  Verbal;Tactile  -SS      Sets 5  1  -SS      Reps 5  30  -SS         Exercise 6    Exercise Name 6  Supine AAROM shoulder ER/IR in 30 deg abduction  -SS      Cueing 6  Verbal;Tactile  -SS      Sets 6  1  -SS      Reps 6  20  -SS      Additional Comments  manual assist  -SS         Exercise 7    Exercise Name 7  Supine AROM shoulder ER/IR in 30 deg abd  -SS      Cueing 7  Verbal;Tactile  -SS      Sets 7  1  -SS      Reps 7  15  -SS      Additional Comments  manual assist to keep shoulder from extending  -SS         Exercise 8    Exercise Name 8  Supine isometric shoulder extension - B simultaneous  -SS      Cueing 8  Verbal  -SS      Sets 8  1  -SS      Reps 8  10  -SS      Additional Comments  5 sec hold  -SS         Exercise 9    Exercise Name 9  Supine scapular retraction into table  -SS      Cueing 9  Verbal  -SS      Sets 9  1  -SS      Reps 9  10  -SS      Additional Comments  5 sec hold  -SS        User Key  (r) = Recorded By, (t) = Taken By, (c) = Cosigned By    Initials Name Provider Type    Alfonzo Pat, PT DPT Physical Therapist            PT OP Goals     Row Name 11/02/20 0800          PT Short Term Goals    STG Date to Achieve  11/17/20  -SS     STG 1  Note a >/= 15% subjective improvement.  -SS     STG 1 Progress  Ongoing  -SS     STG 2  QuickDASH score to be  </= 70.  -SS     STG 2 Progress  Ongoing  -SS     STG 3  Increase cervical flexion to >/= 25 deg.  -SS     STG 3 Progress  Ongoing  -SS     STG 4  Increase R shoulder active flexion to >/= 75 deg.  -SS     STG 4 Progress  Ongoing  -SS     STG 5  Increase R shoulder active extension to >/= 40 deg.  -SS     STG 5 Progress  Ongoing  -SS        Long Term Goals    LTG Date to Achieve  01/05/21  -     LTG 1  Independent with HEP.  -SS     LTG 1 Progress  Ongoing  -SS     LTG 2  QuickDASH score to be </= 50.  -SS     LTG 2 Progress  Ongoing  -SS     LTG 3  R shoulder active elevation to 90 deg.  -SS     LTG 3 Progress  Ongoing  -SS        Time Calculation    PT Goal Re-Cert Due Date  11/17/20  -       User Key  (r) = Recorded By, (t) = Taken By, (c) = Cosigned By    Initials Name Provider Type    SS Alfonzo Maxwell, PT DPT Physical Therapist                         Time Calculation:   Start Time: 0856  Stop Time: 0940  Time Calculation (min): 44 min  Total Timed Code Minutes- PT: 28 minute(s)  Therapy Charges for Today     Code Description Service Date Service Provider Modifiers Qty    50746394133 HC PT THER PROC EA 15 MIN 11/2/2020 Alfonzo Maxwell, PT DPT GP 2    82660255783 HC PT THER SUPP EA 15 MIN 11/2/2020 Alfonzo Maxwell, PT DPT GP 1                    Alfonzo Maxwell, PT, DPT, CHT  11/2/2020

## 2020-11-04 ENCOUNTER — HOSPITAL ENCOUNTER (OUTPATIENT)
Dept: PHYSICAL THERAPY | Facility: HOSPITAL | Age: 45
Setting detail: THERAPIES SERIES
Discharge: HOME | End: 2020-11-04

## 2020-11-04 DIAGNOSIS — M25.511 CHRONIC RIGHT SHOULDER PAIN: Primary | ICD-10-CM

## 2020-11-04 DIAGNOSIS — G89.29 CHRONIC RIGHT SHOULDER PAIN: Primary | ICD-10-CM

## 2020-11-04 DIAGNOSIS — M54.9 MID BACK PAIN: ICD-10-CM

## 2020-11-04 PROCEDURE — 97110 THERAPEUTIC EXERCISES: CPT | Performed by: PHYSICAL THERAPIST

## 2020-11-04 PROCEDURE — 97140 MANUAL THERAPY 1/> REGIONS: CPT | Performed by: PHYSICAL THERAPIST

## 2020-11-04 NOTE — THERAPY TREATMENT NOTE
Outpatient Physical Therapy Ortho Treatment Note  [facility]     Patient Name: Christal Holder  : 1975  MRN: [id number]  Today's Date: 2020      Visit Date: 2020  Attendance: 3/3 (45/yr on primary)  Subjective Improvement: 0%  Next MD Appt: PRN  Recert Date: 2020     Therapy Diagnosis: chronic R shoulder pain and immobility s/p UT accident     Visit Dx:    ICD-10-CM ICD-9-CM   1. Chronic right shoulder pain  M25.511 719.41    G89.29 338.29   2. Mid back pain  M54.9 724.5       Patient Active Problem List   Diagnosis   • Suicide ideation   • Severe episode of recurrent major depressive disorder, without psychotic features (CMS/HCC)        Past Medical History:   Diagnosis Date   • Anxiety    • Atelectasis    • Chronic pain disorder    • Compression fracture of body of thoracic vertebra (CMS/HCC)     T6-T12   • COPD (chronic obstructive pulmonary disease) (CMS/HCC)     due to trauma   • Depression    • Diabetes mellitus (CMS/HCC)    • Glenoid fracture of shoulder    • Pneumothorax     due to rib fractures   • Rib fracture    • Scapular fracture    • Severe episode of recurrent major depressive disorder, without psychotic features (CMS/HCC)         Past Surgical History:   Procedure Laterality Date   • CHOLECYSTECTOMY     • GLENOID OPEN REDUCTION INTERNAL FIXATION Right    • SCAPULA OPEN REDUCTION INTERNAL FIXATION     • TUBAL ABDOMINAL LIGATION         PT Ortho     Row Name 20 1100       Subjective Comments    Subjective Comments  Patient reports that she was up and down a lot last night due to her back and shoulder pain. Used heating pad on neck and UT region last night.   -SS       Subjective Pain    Able to rate subjective pain?  yes  -SS    Pre-Treatment Pain Level  4  -SS    Post-Treatment Pain Level  4  -SS       Cervical/Thoracic Special Tests    Cervical/Thoracic Special Tests Comments  Increased tone B cervical paraspinals.   -SS      User Key  (r) = Recorded By,  (t) = Taken By, (c) = Cosigned By    Initials Name Provider Type     Alfonzo Maxwell, PT DPT Physical Therapist          PT Assessment/Plan     Row Name 11/04/20 1100          PT Assessment    Functional Limitations  Limitation in home management;Limitations in community activities;Limitations in functional capacity and performance;Performance in leisure activities;Performance in self-care ADL;Performance in work activities  -     Impairments  Pain;Range of motion;Motor function;Muscle strength  -     Assessment Comments  Shoulder pain increased with therex then decreased with MHP. Good tolerance.  -     Rehab Potential  Fair barrier: chronicity, suprascapular nerve injury  -     Patient/caregiver participated in establishment of treatment plan and goals  Yes  -SS     Patient would benefit from skilled therapy intervention  Yes  -SS        PT Plan    PT Frequency  2x/week  -     Predicted Duration of Therapy Intervention (PT)  8-10 weeks  -     PT Plan Comments  Continue POC. Isometric shoulder flexion and abduction next.  -       User Key  (r) = Recorded By, (t) = Taken By, (c) = Cosigned By    Initials Name Provider Type     Alfonzo Maxwell, PT DPT Physical Therapist          Modalities     Row Name 11/04/20 1100             Moist Heat    MH Applied  Yes  -SS      Location  R shoulder and trunk  -      Rx Minutes  15 mins  -SS      MH Prior to Rx  No  -SS      MH S/P Rx  Yes  -SS        User Key  (r) = Recorded By, (t) = Taken By, (c) = Cosigned By    Initials Name Provider Type     Alfonzo Maxwell, PT DPT Physical Therapist        OP Exercises     Row Name 11/04/20 1100             Subjective Comments    Subjective Comments  Patient reports that she was up and down a lot last night due to her back and shoulder pain. Used heating pad on neck and UT region last night.   -         Subjective Pain    Able to rate subjective pain?  yes  -SS      Pre-Treatment Pain Level  4   -SS      Post-Treatment Pain Level  4  -SS         Exercise 1    Exercise Name 1  Manual cervical distraction  -SS      Cueing 1  Tactile  -SS      Time 1  8 mins  -SS      Additional Comments  30 sec hold, 15 sec rest  -SS         Exercise 2    Exercise Name 2  MFR B SCM and R UT  -SS      Cueing 2  Tactile  -SS      Time 2  10 mins  -SS         Exercise 3    Exercise Name 3  Supine chin tuck  -SS      Cueing 3  Verbal;Tactile  -SS      Sets 3  1  -SS      Reps 3  5  -SS      Time 3  5 sec hold  -SS         Exercise 4    Exercise Name 4  Supine cervical flexion with chin tuck  -SS      Cueing 4  Verbal;Tactile  -SS      Sets 4  2  -SS      Reps 4  5  -SS         Exercise 5    Exercise Name 5  Gentle isometric scapular elevation  -SS      Cueing 5  Verbal;Tactile  -SS      Sets 5  1  -SS      Reps 5  10  -SS      Time 5  5 sec hold  -SS         Exercise 6    Exercise Name 6  Supine AAROM shoulder flexion  -SS      Cueing 6  Verbal;Tactile  -SS      Sets 6  2  -SS      Reps 6  20  -SS      Additional Comments  manual assist  -SS         Exercise 7    Exercise Name 7  Supine AROM shoulder flexion  -SS      Cueing 7  Verbal  -SS      Sets 7  1  -SS      Reps 7  10  -SS         Exercise 8    Exercise Name 8  Supine AAROM shoulder abduction  -SS      Cueing 8  Verbal;Tactile  -SS      Sets 8  2  -SS      Reps 8  20  -SS      Additional Comments  manual assist  -SS         Exercise 9    Exercise Name 9  Supine AROM shoulder abduction  -SS      Cueing 9  Verbal  -SS      Sets 9  1  -SS      Reps 9  10  -SS         Exercise 10    Exercise Name 10  Supine isometric shoulder extension and scapular retraction- B simultaneous  -SS      Cueing 10  Verbal  -SS      Sets 10  1  -SS      Reps 10  15  -SS      Time 10  5 sec hold  -SS        User Key  (r) = Recorded By, (t) = Taken By, (c) = Cosigned By    Initials Name Provider Type    Alfonzo Pat PT DPT Physical Therapist              PT OP Goals     Row Name  11/04/20 1100          PT Short Term Goals    STG Date to Achieve  11/17/20  -SS     STG 1  Note a >/= 15% subjective improvement.  -SS     STG 1 Progress  Ongoing  -SS     STG 2  QuickDASH score to be </= 70.  -SS     STG 2 Progress  Ongoing  -SS     STG 3  Increase cervical flexion to >/= 25 deg.  -SS     STG 3 Progress  Ongoing  -SS     STG 4  Increase R shoulder active flexion to >/= 75 deg.  -SS     STG 4 Progress  Ongoing  -SS     STG 5  Increase R shoulder active extension to >/= 40 deg.  -SS     STG 5 Progress  Ongoing  -SS        Long Term Goals    LTG Date to Achieve  01/05/21  -SS     LTG 1  Independent with HEP.  -SS     LTG 1 Progress  Ongoing  -SS     LTG 2  QuickDASH score to be </= 50.  -SS     LTG 2 Progress  Ongoing  -SS     LTG 3  R shoulder active elevation to 90 deg.  -SS     LTG 3 Progress  Ongoing  -SS        Time Calculation    PT Goal Re-Cert Due Date  11/17/20  -       User Key  (r) = Recorded By, (t) = Taken By, (c) = Cosigned By    Initials Name Provider Type    SS Alfonzo Maxwell, PT DPT Physical Therapist                         Time Calculation:   Start Time: 1105  Stop Time: 1158  Time Calculation (min): 53 min  Total Timed Code Minutes- PT: 38 minute(s)  Therapy Charges for Today     Code Description Service Date Service Provider Modifiers Qty    24389501376 HC PT MANUAL THERAPY EA 15 MIN 11/4/2020 Alfonzo Maxwell, PT DPT GP 1    49617144216 HC PT THER PROC EA 15 MIN 11/4/2020 Alfonzo Maxwell, PT DPT GP 2    21876079055 HC PT THER SUPP EA 15 MIN 11/4/2020 Alfonzo Maxwell, PT DPT GP 1                    Alfonzo Maxwell, PT, DPT, CHT  11/4/2020

## 2020-11-09 ENCOUNTER — HOSPITAL ENCOUNTER (OUTPATIENT)
Dept: PHYSICAL THERAPY | Facility: HOSPITAL | Age: 45
Setting detail: THERAPIES SERIES
Discharge: HOME | End: 2020-11-09

## 2020-11-09 DIAGNOSIS — G89.29 CHRONIC RIGHT SHOULDER PAIN: Primary | ICD-10-CM

## 2020-11-09 DIAGNOSIS — M25.511 CHRONIC RIGHT SHOULDER PAIN: Primary | ICD-10-CM

## 2020-11-09 DIAGNOSIS — M54.9 MID BACK PAIN: ICD-10-CM

## 2020-11-09 PROCEDURE — 97110 THERAPEUTIC EXERCISES: CPT | Performed by: PHYSICAL THERAPIST

## 2020-11-09 NOTE — THERAPY TREATMENT NOTE
"    Outpatient Physical Therapy Ortho Treatment Note  [facility]     Patient Name: Christal Holder  : 1975  MRN: [id number]  Today's Date: 2020      Visit Date: 2020  Attendance: 4/4 (45/yr on primary)  Subjective Improvement: 15%  Next MD Appt: PRN  Recert Date: 2020     Therapy Diagnosis: chronic R shoulder pain and immobility s/p UT accident     Visit Dx:    ICD-10-CM ICD-9-CM   1. Chronic right shoulder pain  M25.511 719.41    G89.29 338.29   2. Mid back pain  M54.9 724.5            Past Medical History:   Diagnosis Date   • Anxiety    • Atelectasis    • Chronic pain disorder    • Compression fracture of body of thoracic vertebra (CMS/HCC)     T6-T12   • COPD (chronic obstructive pulmonary disease) (CMS/HCC)     due to trauma   • Depression    • Diabetes mellitus (CMS/HCC)    • Glenoid fracture of shoulder    • Pneumothorax     due to rib fractures   • Rib fracture    • Scapular fracture    • Severe episode of recurrent major depressive disorder, without psychotic features (CMS/HCC)         Past Surgical History:   Procedure Laterality Date   • CHOLECYSTECTOMY     • GLENOID OPEN REDUCTION INTERNAL FIXATION Right    • SCAPULA OPEN REDUCTION INTERNAL FIXATION     • TUBAL ABDOMINAL LIGATION         PT Ortho     Row Name 20 1100       Subjective Comments    Subjective Comments  \"I'm hurting.\" Shot a basketball yesterday twice 1-2 hours apart. Painful about the shoulder, arm, and neck from the shot. Did not sleep well last night. 15% subjective improvement.  -SS       Subjective Pain    Able to rate subjective pain?  yes  -SS    Pre-Treatment Pain Level  -- 6-7/10  -SS    Post-Treatment Pain Level  4  -SS       Right Upper Ext    Rt Shoulder Abduction AROM  75 deg in supine; 70 deg seated  -SS    Rt Shoulder Abduction PROM  95 deg  -SS    Rt Shoulder Flexion AROM  118 deg supine AAROM; 83 deg supine AROM; 70 deg seated AROM  -SS    Rt Shoulder Flexion PROM  123 deg  -SS    Rt " "Shoulder External Rotation AROM  32 deg, seated with shoulder neutral  -    Rt Shoulder External Rotation PROM  50 deg supine with shoulder abducted 45 deg; 35 deg supine with arm at side  -      User Key  (r) = Recorded By, (t) = Taken By, (c) = Cosigned By    Initials Name Provider Type     Alfonzo Maxwell, PT DPT Physical Therapist            PT Assessment/Plan     Row Name 11/09/20 1100          PT Assessment    Functional Limitations  Limitation in home management;Limitations in community activities;Limitations in functional capacity and performance;Performance in leisure activities;Performance in self-care ADL;Performance in work activities  -     Impairments  Pain;Range of motion;Motor function;Muscle strength  -     Assessment Comments  Improved active and passive range of motion this date.  -     Rehab Potential  Fair barrier: chronicity, suprascapular nerve injury  -     Patient/caregiver participated in establishment of treatment plan and goals  Yes  -SS     Patient would benefit from skilled therapy intervention  Yes  -SS        PT Plan    PT Frequency  2x/week  -     Predicted Duration of Therapy Intervention (PT)  8-10 weeks  -     PT Plan Comments  Continue POC. Advance as tolerated.  -       User Key  (r) = Recorded By, (t) = Taken By, (c) = Cosigned By    Initials Name Provider Type     Alfonzo Maxwell, PT DPT Physical Therapist          Modalities     Row Name 11/09/20 1100             Moist Heat    MH Applied  Yes  -      Location  R shoulder and trunk  -      Rx Minutes  15 mins  -      MH Prior to Rx  No  -      MH S/P Rx  Yes  -        User Key  (r) = Recorded By, (t) = Taken By, (c) = Cosigned By    Initials Name Provider Type     Alfonzo Maxwell, PT DPT Physical Therapist        OP Exercises     Row Name 11/09/20 1100             Subjective Comments    Subjective Comments  \"I'm hurting.\" Shot a basketball yesterday twice 1-2 hours apart. " Painful about the shoulder, arm, and neck from the shot. Did not sleep well last night. 15% subjective improvement.  -SS         Subjective Pain    Able to rate subjective pain?  yes  -SS      Pre-Treatment Pain Level  -- 6-7/10  -SS      Post-Treatment Pain Level  4  -SS         Exercise 1    Exercise Name 1  PROM supine shoulder abduction with oscillation  -SS      Cueing 1  Tactile  -SS      Time 1  2 mins  -SS         Exercise 2    Exercise Name 2  PROM supine shoulder flexion  -SS      Cueing 2  Tactile  -SS      Time 2  2 mins  -SS         Exercise 3    Exercise Name 3  AAROM supine shoulder flexion  -SS      Cueing 3  Verbal;Tactile  -SS      Sets 3  1  -SS      Reps 3  20  -SS         Exercise 4    Exercise Name 4  PROM supine shoulder ER/IR in 45 deg abd  -SS      Cueing 4  Verbal;Tactile  -SS      Time 4  2 mins  -SS         Exercise 5    Exercise Name 5  AAROM supine shoulder ER/IR in 45 deg and at neutral  -SS      Cueing 5  Verbal;Tactile  -SS      Sets 5  1  -SS      Reps 5  20 ea  -SS         Exercise 6    Exercise Name 6  check ROM  -SS         Exercise 7    Exercise Name 7  see Manual  -SS         Exercise 8    Exercise Name 8  Supine isometric shoulder abduction, flexion, and ER  -SS      Cueing 8  Verbal;Tactile  -SS      Sets 8  1  -SS      Reps 8  15 each  -SS      Time 8  3 sec hold  -SS        User Key  (r) = Recorded By, (t) = Taken By, (c) = Cosigned By    Initials Name Provider Type    Alfonzo Pat, PT DPT Physical Therapist                      Manual Rx (last 36 hours)      Manual Treatments     Row Name 11/09/20 1100             Manual Rx 1    Manual Rx 1 Location  cervical spine  -SS      Manual Rx 1 Type  manual distraction and MFR  -SS         Manual Rx 2    Manual Rx 2 Location  R UT and SCM  -SS      Manual Rx 2 Type  MFR  -SS        User Key  (r) = Recorded By, (t) = Taken By, (c) = Cosigned By    Initials Name Provider Type    Alfonzo Pat, PT DPT  Physical Therapist          PT OP Goals     Row Name 11/09/20 1100          PT Short Term Goals    STG Date to Achieve  11/17/20  -SS     STG 1  Note a >/= 15% subjective improvement.  -SS     STG 1 Progress  Met  -SS     STG 2  QuickDASH score to be </= 70.  -SS     STG 2 Progress  Ongoing  -SS     STG 3  Increase cervical flexion to >/= 25 deg.  -SS     STG 3 Progress  Ongoing  -SS     STG 4  Increase R shoulder active flexion to >/= 75 deg.  -SS     STG 4 Progress  Ongoing  -SS     STG 5  Increase R shoulder active extension to >/= 40 deg.  -SS     STG 5 Progress  Ongoing  -SS        Long Term Goals    LTG Date to Achieve  01/05/21  -SS     LTG 1  Independent with HEP.  -SS     LTG 1 Progress  Ongoing  -SS     LTG 2  QuickDASH score to be </= 50.  -SS     LTG 2 Progress  Ongoing  -SS     LTG 3  R shoulder active elevation to 90 deg.  -SS     LTG 3 Progress  Ongoing  -SS        Time Calculation    PT Goal Re-Cert Due Date  11/17/20  -       User Key  (r) = Recorded By, (t) = Taken By, (c) = Cosigned By    Initials Name Provider Type    SS Alfonzo Maxwell, PT DPT Physical Therapist                         Time Calculation:   Start Time: 1101  Stop Time: 1157  Time Calculation (min): 56 min  Total Timed Code Minutes- PT: 40 minute(s)  Therapy Charges for Today     Code Description Service Date Service Provider Modifiers Qty    62238176893 HC PT THER PROC EA 15 MIN 11/9/2020 Alfonzo Maxwell, PT DPT GP 3    19591146460 HC PT THER SUPP EA 15 MIN 11/9/2020 Alfonzo Maxwell, PT DPT GP 1                    Alfonzo Maxwell, PT DPT, CHT  11/9/2020

## 2020-11-11 ENCOUNTER — HOSPITAL ENCOUNTER (OUTPATIENT)
Dept: PHYSICAL THERAPY | Facility: HOSPITAL | Age: 45
Setting detail: THERAPIES SERIES
Discharge: HOME | End: 2020-11-11

## 2020-11-11 DIAGNOSIS — G89.29 CHRONIC RIGHT SHOULDER PAIN: Primary | ICD-10-CM

## 2020-11-11 DIAGNOSIS — M54.9 MID BACK PAIN: ICD-10-CM

## 2020-11-11 DIAGNOSIS — M25.511 CHRONIC RIGHT SHOULDER PAIN: Primary | ICD-10-CM

## 2020-11-11 PROCEDURE — 97110 THERAPEUTIC EXERCISES: CPT | Performed by: PHYSICAL THERAPIST

## 2020-11-12 ENCOUNTER — APPOINTMENT (OUTPATIENT)
Dept: PHYSICAL THERAPY | Facility: HOSPITAL | Age: 45
End: 2020-11-12

## 2020-11-16 ENCOUNTER — HOSPITAL ENCOUNTER (OUTPATIENT)
Dept: PHYSICAL THERAPY | Facility: HOSPITAL | Age: 45
Setting detail: THERAPIES SERIES
Discharge: HOME | End: 2020-11-16

## 2020-11-16 DIAGNOSIS — M25.511 CHRONIC RIGHT SHOULDER PAIN: Primary | ICD-10-CM

## 2020-11-16 DIAGNOSIS — G89.29 CHRONIC RIGHT SHOULDER PAIN: Primary | ICD-10-CM

## 2020-11-16 DIAGNOSIS — M54.9 MID BACK PAIN: ICD-10-CM

## 2020-11-16 PROCEDURE — 97110 THERAPEUTIC EXERCISES: CPT | Performed by: PHYSICAL THERAPIST

## 2020-11-16 NOTE — THERAPY TREATMENT NOTE
"    Outpatient Physical Therapy Ortho Treatment Note  [facility]     Patient Name: Christal Holder  : 1975  MRN: [id number]  Today's Date: 2020      Visit Date: 2020  Attendance: 5/5 (45/yr on primary)  Subjective Improvement: 20%  Next MD Appt: PRN  Recert Date: 2020     Therapy Diagnosis: chronic R shoulder pain and immobility s/p UT accident     Visit Dx:    ICD-10-CM ICD-9-CM   1. Chronic right shoulder pain  M25.511 719.41    G89.29 338.29   2. Mid back pain  M54.9 724.5            Past Medical History:   Diagnosis Date   • Anxiety    • Atelectasis    • Chronic pain disorder    • Compression fracture of body of thoracic vertebra (CMS/HCC)     T6-T12   • COPD (chronic obstructive pulmonary disease) (CMS/HCC)     due to trauma   • Depression    • Diabetes mellitus (CMS/HCC)    • Glenoid fracture of shoulder    • Pneumothorax     due to rib fractures   • Rib fracture    • Scapular fracture    • Severe episode of recurrent major depressive disorder, without psychotic features (CMS/HCC)         Past Surgical History:   Procedure Laterality Date   • CHOLECYSTECTOMY     • GLENOID OPEN REDUCTION INTERNAL FIXATION Right    • SCAPULA OPEN REDUCTION INTERNAL FIXATION     • TUBAL ABDOMINAL LIGATION         PT Ortho     Row Name 20 1100       Subjective Comments    Subjective Comments  Pain R UT/lateral cervical spine down R UE to elbow. Has been using heat and taken medication already today. Pain flared up either Friday or Saturday of unknown cause. \"I haven't done anything different that I can think of.\" 20% subjective improvement.  -SS       Subjective Pain    Able to rate subjective pain?  yes  -SS    Pre-Treatment Pain Level  -- 5-6  -SS       Right Upper Ext    Rt Shoulder Abduction AROM  100 deg with table inclined 15 deg  -SS    Rt Shoulder Flexion AROM  93 deg with table inclined 15 deg  -SS      User Key  (r) = Recorded By, (t) = Taken By, (c) = Cosigned By    Initials Name " "Provider Type     Alfonzo Maxwell, PT DPT Physical Therapist            PT Assessment/Plan     Row Name 11/16/20 1100          PT Assessment    Functional Limitations  Limitation in home management;Limitations in community activities;Limitations in functional capacity and performance;Performance in leisure activities;Performance in self-care ADL;Performance in work activities  -     Impairments  Pain;Range of motion;Motor function;Muscle strength  -     Assessment Comments  ROM continues to improved. Tolerated inclined position instead of supine well. Good effort.  -     Rehab Potential  Fair barrier: chronicity, suprascapular nerve injury  -     Patient/caregiver participated in establishment of treatment plan and goals  Yes  -SS     Patient would benefit from skilled therapy intervention  Yes  -SS        PT Plan    PT Frequency  2x/week  -     Predicted Duration of Therapy Intervention (PT)  8-10 weeks  -     PT Plan Comments  Recheck next. Advance ROM as tolerated.  -       User Key  (r) = Recorded By, (t) = Taken By, (c) = Cosigned By    Initials Name Provider Type     Alfonzo Maxwell, PT DPT Physical Therapist          Modalities     Row Name 11/16/20 1100             Subjective Pain    Post-Treatment Pain Level  4  -         Moist Heat    MH Applied  Yes  -SS      Location  R shoulder and trunk  -      Rx Minutes  15 mins  -SS      MH Prior to Rx  No  -SS      MH S/P Rx  Yes  -SS        User Key  (r) = Recorded By, (t) = Taken By, (c) = Cosigned By    Initials Name Provider Type     Alfonzo Maxwell, PT DPT Physical Therapist        OP Exercises     Row Name 11/16/20 1100             Subjective Comments    Subjective Comments  Pain R UT/lateral cervical spine down R UE to elbow. Has been using heat and taken medication already today. Pain flared up either Friday or Saturday of unknown cause. \"I haven't done anything different that I can think of.\" 20% subjective " improvement.  -SS         Subjective Pain    Able to rate subjective pain?  yes  -SS      Pre-Treatment Pain Level  -- 5-6  -SS      Post-Treatment Pain Level  4  -SS         Exercise 1    Exercise Name 1  PROM shoulder flexion, table inclined 15 deg  -SS      Cueing 1  Tactile  -SS      Reps 1  30  -SS      Additional Comments  popping/clunk noted > 100 deg  -SS         Exercise 2    Exercise Name 2  AAROM shoulder flexion , table inclined 15 deg  -SS      Cueing 2  Tactile;Verbal  -SS      Reps 2  20  -SS         Exercise 3    Exercise Name 3  AROM shoulder flexion, table inclined 15 deg  -SS      Cueing 3  Verbal  -SS      Reps 3  10  -SS         Exercise 4    Exercise Name 4  PROM shoulder abduction, table inclined 15 deg  -SS      Cueing 4  Tactile  -SS      Reps 4  30  -SS         Exercise 5    Exercise Name 5  AAROM shoulder abduction with table inclined 15 deg  -SS      Cueing 5  Verbal;Tactile  -SS      Reps 5  20  -SS         Exercise 6    Exercise Name 6  AROM shoulder abduction with table inclined 15 deg  -SS      Cueing 6  Verbal  -SS      Reps 6  10  -SS      Additional Comments  P.T. assist to keep arm in mid-axillary plane  -SS         Exercise 7    Exercise Name 7  Supine shoulder ABCs  -SS      Cueing 7  Verbal  -SS      Reps 7  1  -SS         Exercise 8    Exercise Name 8  AROM shoulder IR with table inclined 15 deg, scapular plane  -SS      Cueing 8  Verbal  -SS      Reps 8  10  -SS      Additional Comments  P.T. assist to keep arm in mid-axillary plane  -SS         Exercise 9    Exercise Name 9  MFR cervical paraspinals and R UT/levator scapula  -SS      Cueing 9  Verbal;Tactile  -SS      Time 9  5 mins  -SS         Exercise 10    Exercise Name 10  Supine isometric scapular depression  -SS      Cueing 10  Verbal;Tactile  -SS      Sets 10  1  -SS      Reps 10  15  -SS      Time 10  3 sec hold  -SS      Additional Comments  PT resist  -SS        User Key  (r) = Recorded By, (t) = Taken By, (c) =  Cosigned By    Initials Name Provider Type    SS Alfonzo Maxwell, PT DPT Physical Therapist          PT OP Goals     Row Name 11/16/20 1100          PT Short Term Goals    STG Date to Achieve  11/17/20  -     STG 1  Note a >/= 15% subjective improvement.  -SS     STG 1 Progress  Met  -SS     STG 2  QuickDASH score to be </= 70.  -SS     STG 2 Progress  Ongoing  -SS     STG 3  Increase cervical flexion to >/= 25 deg.  -SS     STG 3 Progress  Ongoing  -SS     STG 4  Increase R shoulder active flexion to >/= 75 deg.  -SS     STG 4 Progress  Ongoing  -SS     STG 5  Increase R shoulder active extension to >/= 40 deg.  -SS     STG 5 Progress  Ongoing  -SS        Long Term Goals    LTG Date to Achieve  01/05/21  -     LTG 1  Independent with HEP.  -SS     LTG 1 Progress  Ongoing  -SS     LTG 2  QuickDASH score to be </= 50.  -SS     LTG 2 Progress  Ongoing  -SS     LTG 3  R shoulder active elevation to 90 deg.  -SS     LTG 3 Progress  Ongoing  -SS        Time Calculation    PT Goal Re-Cert Due Date  11/17/20  -       User Key  (r) = Recorded By, (t) = Taken By, (c) = Cosigned By    Initials Name Provider Type     Alfonzo Maxwell, PT DPT Physical Therapist                         Time Calculation:   Start Time: 1104  Stop Time: 1156  Time Calculation (min): 52 min  Total Timed Code Minutes- PT: 35 minute(s)  Therapy Charges for Today     Code Description Service Date Service Provider Modifiers Qty    67358760273 HC PT THER PROC EA 15 MIN 11/16/2020 Alfonzo Maxwell, PT DPT GP 2    49346507429 HC PT THER SUPP EA 15 MIN 11/16/2020 Alfonzo Maxwell, PT DPT GP 1                    Alfonzo Maxwell, PT, DPT, CHT  11/16/2020

## 2020-11-20 ENCOUNTER — HOSPITAL ENCOUNTER (OUTPATIENT)
Dept: PHYSICAL THERAPY | Facility: HOSPITAL | Age: 45
Setting detail: THERAPIES SERIES
Discharge: HOME | End: 2020-11-20

## 2020-11-20 DIAGNOSIS — G89.29 CHRONIC RIGHT SHOULDER PAIN: Primary | ICD-10-CM

## 2020-11-20 DIAGNOSIS — M25.511 CHRONIC RIGHT SHOULDER PAIN: Primary | ICD-10-CM

## 2020-11-20 DIAGNOSIS — M54.9 MID BACK PAIN: ICD-10-CM

## 2020-11-20 PROCEDURE — 97110 THERAPEUTIC EXERCISES: CPT | Performed by: PHYSICAL THERAPIST

## 2020-11-20 NOTE — THERAPY PROGRESS REPORT/RE-CERT
"    Outpatient Physical Therapy Ortho Progress Note  [facility]     Patient Name: Christal Holder  : 1975  MRN: [id number]  Today's Date: 2020      Visit Date: 2020  Attendance: 6/6 (45/yr on primary)  Subjective Improvement: 20%  Next MD Appt: PRN  Recert Date: 2020     Therapy Diagnosis: chronic R shoulder pain and immobility s/p UT accident     Changes in Medications: none  Changes in MD Orders: none  Number of Work Days Lost: n/a       Past Medical History:   Diagnosis Date   • Anxiety    • Atelectasis    • Chronic pain disorder    • Compression fracture of body of thoracic vertebra (CMS/HCC)     T6-T12   • COPD (chronic obstructive pulmonary disease) (CMS/HCC)     due to trauma   • Depression    • Diabetes mellitus (CMS/HCC)    • Glenoid fracture of shoulder    • Pneumothorax     due to rib fractures   • Rib fracture    • Scapular fracture    • Severe episode of recurrent major depressive disorder, without psychotic features (CMS/HCC)         Past Surgical History:   Procedure Laterality Date   • CHOLECYSTECTOMY     • GLENOID OPEN REDUCTION INTERNAL FIXATION Right    • SCAPULA OPEN REDUCTION INTERNAL FIXATION     • TUBAL ABDOMINAL LIGATION         Visit Dx:     ICD-10-CM ICD-9-CM   1. Chronic right shoulder pain  M25.511 719.41    G89.29 338.29   2. Mid back pain  M54.9 724.5             PT Ortho     Row Name 20 1000       Subjective Comments    Subjective Comments  Shoulder is \"really stiff this morning.\" Not having a lot of pain presently. 20% subjective improvement.  -SS       Subjective Pain    Able to rate subjective pain?  yes  -SS    Pre-Treatment Pain Level  4  -SS    Post-Treatment Pain Level  -- \"better\"  -SS       Head/Neck/Trunk    Neck Flexion AROM  27 deg  -SS    Neck Lt Rotation AROM  38 deg  -SS    Neck Rt Rotation AROM  38 deg  -SS       Right Upper Ext    Rt Shoulder Abduction AROM  73 deg standing; pain  -SS    Rt Shoulder Extension AROM  23 deg; pain  " -SS    Rt Shoulder Flexion AROM  73 deg standing; 92 deg with table inclined 15 deg; pain  -      User Key  (r) = Recorded By, (t) = Taken By, (c) = Cosigned By    Initials Name Provider Type    Alfonzo Pat, PT DPT Physical Therapist          Therapy Education  Education Details: reviewed progress and therapy plan  How Provided: Verbal  Provided to: Patient  Level of Understanding: Verbalized     PT OP Goals     Row Name 11/20/20 1000          PT Short Term Goals    STG Date to Achieve  11/17/20  -     STG 1  Note a >/= 15% subjective improvement.  -SS     STG 1 Progress  Met  -SS     STG 2  QuickDASH score to be </= 70.  -SS     STG 2 Progress  Not Met;Ongoing  -SS     STG 2 Progress Comments  not rated this date  -SS     STG 3  Increase cervical flexion to >/= 25 deg.  -SS     STG 3 Progress  Met  -SS     STG 4  Increase R shoulder active flexion to >/= 75 deg.  -SS     STG 4 Progress  Not Met;Ongoing  -     STG 5  Increase R shoulder active extension to >/= 40 deg.  -SS     STG 5 Progress  Not Met;Ongoing  -        Long Term Goals    LTG Date to Achieve  01/05/21  -     LTG 1  Independent with HEP.  -SS     LTG 1 Progress  Not Met;Ongoing  -SS     LTG 2  QuickDASH score to be </= 50.  -SS     LTG 2 Progress  Not Met;Ongoing  -SS     LTG 3  R shoulder active elevation to 90 deg.  -SS     LTG 3 Progress  Not Met;Ongoing  -        Time Calculation    PT Goal Re-Cert Due Date  12/11/20  -       User Key  (r) = Recorded By, (t) = Taken By, (c) = Cosigned By    Initials Name Provider Type    Alfonzo Pat, PT DPT Physical Therapist          PT Assessment/Plan     Row Name 11/20/20 1000          PT Assessment    Functional Limitations  Limitation in home management;Limitations in community activities;Limitations in functional capacity and performance;Performance in leisure activities;Performance in self-care ADL;Performance in work activities  -     Impairments  Pain;Range of  "motion;Motor function;Muscle strength  -     Assessment Comments  Improved cervical flexion and shoulder flexion/abduction. Continued limitations in movement and activity.   -     Rehab Potential  Fair barrier: chronicity, suprascapular nerve injury  -     Patient/caregiver participated in establishment of treatment plan and goals  Yes  -SS     Patient would benefit from skilled therapy intervention  Yes  -SS        PT Plan    PT Frequency  2x/week  -     Predicted Duration of Therapy Intervention (PT)  6-8 weeks  -     PT Plan Comments  A/AA/PROM, strengthening, manual therapy to cervical spine, MHP at conclusion of treatment.  -       User Key  (r) = Recorded By, (t) = Taken By, (c) = Cosigned By    Initials Name Provider Type    Alfonzo Pat, PT DPT Physical Therapist          Modalities     Row Name 11/20/20 1000             Moist Heat    MH Applied  Yes  -SS      Location  R shoulder and trunk  -      Rx Minutes  15 mins  -SS      MH Prior to Rx  No  -SS      MH S/P Rx  Yes  -SS        User Key  (r) = Recorded By, (t) = Taken By, (c) = Cosigned By    Initials Name Provider Type     Alfonzo Maxwell, PT DPT Physical Therapist        OP Exercises     Row Name 11/20/20 1000             Subjective Comments    Subjective Comments  Shoulder is \"really stiff this morning.\" Not having a lot of pain presently. 20% subjective improvement.  -         Subjective Pain    Able to rate subjective pain?  yes  -SS      Pre-Treatment Pain Level  4  -SS      Post-Treatment Pain Level  -- \"better\"  -         Exercise 1    Exercise Name 1  recheck  -         Exercise 2    Exercise Name 2  AAROM shoulder flexion , table inclined 15 deg  -SS      Cueing 2  Verbal;Tactile  -SS      Sets 2  3  -SS      Reps 2  20  -SS         Exercise 3    Exercise Name 3  AAROM shoulder abduction with table inclined 15 deg  -SS      Cueing 3  Verbal;Tactile  -SS      Sets 3  3  -SS      Reps 3  20  -SS         " Exercise 4    Exercise Name 4  AROM shoulder flexion, table inclined 15 deg  -SS      Cueing 4  Verbal  -SS      Reps 4  30  -SS         Exercise 5    Exercise Name 5  AROM shoulder abduction, table inclined 15 deg  -SS      Cueing 5  Verbal  -SS      Reps 5  20  -SS         Exercise 6    Exercise Name 6  AAROM shoulder ER in scapular plane, table inclined 15 deg  -SS      Cueing 6  Verbal;Tactile  -SS      Reps 6  20  -SS         Exercise 7    Exercise Name 7  Supine cervical rotation AAROM   -SS      Cueing 7  Verbal;Tactile  -SS      Reps 7  20  -SS         Exercise 8    Exercise Name 8  Supine cervical flexion  -SS      Cueing 8  Verbal  -SS      Sets 8  2  -SS      Reps 8  10  -SS         Exercise 9    Exercise Name 9  Supine chin tuck  -SS      Cueing 9  Verbal;Tactile  -SS      Sets 9  1  -SS      Reps 9  15  -SS        User Key  (r) = Recorded By, (t) = Taken By, (c) = Cosigned By    Initials Name Provider Type    SS Alfonzo Maxwell, PT DPT Physical Therapist         Time Calculation:     Start Time: 1018  Stop Time: 1115  Time Calculation (min): 57 min  Total Timed Code Minutes- PT: 40 minute(s)     Therapy Charges for Today     Code Description Service Date Service Provider Modifiers Qty    77692195133 HC PT THER PROC EA 15 MIN 11/20/2020 Alfonzo Maxwell, PT DPT GP 3    66081771202 HC PT THER SUPP EA 15 MIN 11/20/2020 Alfonzo Maxwell, PT DPT GP 1                    Alfonzo Maxwell, PT, DPT, CHT  11/20/2020

## 2020-11-30 ENCOUNTER — HOSPITAL ENCOUNTER (OUTPATIENT)
Dept: PHYSICAL THERAPY | Facility: HOSPITAL | Age: 45
Setting detail: THERAPIES SERIES
Discharge: HOME | End: 2020-11-30

## 2020-11-30 DIAGNOSIS — M54.9 MID BACK PAIN: ICD-10-CM

## 2020-11-30 DIAGNOSIS — M25.511 RIGHT SHOULDER PAIN, UNSPECIFIED CHRONICITY: ICD-10-CM

## 2020-11-30 DIAGNOSIS — M25.511 CHRONIC RIGHT SHOULDER PAIN: Primary | ICD-10-CM

## 2020-11-30 DIAGNOSIS — G89.29 CHRONIC RIGHT SHOULDER PAIN: Primary | ICD-10-CM

## 2020-11-30 PROCEDURE — 97110 THERAPEUTIC EXERCISES: CPT | Performed by: PHYSICAL THERAPIST

## 2020-12-02 ENCOUNTER — HOSPITAL ENCOUNTER (OUTPATIENT)
Dept: PHYSICAL THERAPY | Facility: HOSPITAL | Age: 45
Setting detail: THERAPIES SERIES
Discharge: HOME | End: 2020-12-02

## 2020-12-02 DIAGNOSIS — M25.511 RIGHT SHOULDER PAIN, UNSPECIFIED CHRONICITY: ICD-10-CM

## 2020-12-02 DIAGNOSIS — M54.9 MID BACK PAIN: ICD-10-CM

## 2020-12-02 DIAGNOSIS — G89.29 OTHER CHRONIC PAIN: ICD-10-CM

## 2020-12-02 DIAGNOSIS — M25.511 CHRONIC RIGHT SHOULDER PAIN: Primary | ICD-10-CM

## 2020-12-02 DIAGNOSIS — G89.29 CHRONIC RIGHT SHOULDER PAIN: Primary | ICD-10-CM

## 2020-12-02 PROCEDURE — 97110 THERAPEUTIC EXERCISES: CPT | Performed by: PHYSICAL THERAPIST

## 2020-12-02 NOTE — THERAPY TREATMENT NOTE
Outpatient Physical Therapy Ortho Treatment Note  [facility]     Patient Name: Christal Holder  : 1975  MRN: [id number]  Today's Date: 2020      Visit Date: 2020  Attendance: 8 (45/yr on primary)  Subjective Improvement: 20%  Next MD Appt: PRN  Recert Date: 2020     Therapy Diagnosis: chronic R shoulder pain and immobility s/p UT accident     Visit Dx:    ICD-10-CM ICD-9-CM   1. Chronic right shoulder pain  M25.511 719.41    G89.29 338.29   2. Mid back pain  M54.9 724.5   3. Right shoulder pain, unspecified chronicity  M25.511 719.41   4. Other chronic pain  G89.29 338.29            Past Medical History:   Diagnosis Date   • Anxiety    • Atelectasis    • Chronic pain disorder    • Compression fracture of body of thoracic vertebra (CMS/HCC)     T6-T12   • COPD (chronic obstructive pulmonary disease) (CMS/HCC)     due to trauma   • Depression    • Diabetes mellitus (CMS/HCC)    • Glenoid fracture of shoulder    • Pneumothorax     due to rib fractures   • Rib fracture    • Scapular fracture    • Severe episode of recurrent major depressive disorder, without psychotic features (CMS/HCC)         Past Surgical History:   Procedure Laterality Date   • CHOLECYSTECTOMY     • GLENOID OPEN REDUCTION INTERNAL FIXATION Right    • SCAPULA OPEN REDUCTION INTERNAL FIXATION     • TUBAL ABDOMINAL LIGATION         PT Ortho     Row Name 20 1000       Cervical/Thoracic Special Tests    Cervical/Thoracic Special Tests Comments  Increased tone R SCM and UT.  -SS       Right Upper Ext    Rt Shoulder Abduction AROM  115 deg, table inclined 20 deg  -SS    Rt Shoulder Flexion AROM  130 deg, table inclined 20 deg  -SS    Rt Shoulder External Rotation AROM  75 deg; 90/90 position with table inclined 20 deg  -SS    Rt Shoulder External Rotation PROM  30 deg; 90/90 position with table inclined 20 deg  -SS      User Key  (r) = Recorded By, (t) = Taken By, (c) = Cosigned By    Initials Name Provider  "Type    SS Alfonzo Maxwell, PT DPT Physical Therapist            PT Assessment/Plan     Row Name 12/02/20 1000          PT Assessment    Functional Limitations  Limitation in home management;Limitations in community activities;Limitations in functional capacity and performance;Performance in leisure activities;Performance in self-care ADL;Performance in work activities  -     Impairments  Pain;Range of motion;Motor function;Muscle strength  -     Assessment Comments  Late for therapy this date due to traffic issues. Tolerated therapy well. Pain with T-band resisted shoulder extension.  -     Rehab Potential  Fair barrier: chronicity, suprascapular nerve injury  -     Patient/caregiver participated in establishment of treatment plan and goals  Yes  -SS     Patient would benefit from skilled therapy intervention  Yes  -SS        PT Plan    PT Frequency  2x/week  -     Predicted Duration of Therapy Intervention (PT)  6-8 weeks  -     PT Plan Comments  Recheck next week. Advance table incline next.  -SS       User Key  (r) = Recorded By, (t) = Taken By, (c) = Cosigned By    Initials Name Provider Type    Alfonzo Pat, PT DPT Physical Therapist          Modalities     Row Name 12/02/20 1000             Moist Heat    MH Applied  Yes  -SS      Location  R shoulder and trunk  -      Rx Minutes  15 mins  -SS      MH Prior to Rx  No  -SS      MH S/P Rx  Yes  -SS        User Key  (r) = Recorded By, (t) = Taken By, (c) = Cosigned By    Initials Name Provider Type    Alfonzo Pat, PT DPT Physical Therapist        OP Exercises     Row Name 12/02/20 1000             Subjective Comments    Subjective Comments  \"Feels a little stiff and pretty sore.\" Soreness is in the upper trap region. 20% subjective improvement.   -SS         Subjective Pain    Able to rate subjective pain?  yes  -SS      Pre-Treatment Pain Level  5  -SS      Post-Treatment Pain Level  3  -         Exercise 1    " Exercise Name 1  Active R UT with manual stabilization of R scapula  -SS      Cueing 1  Verbal;Tactile  -SS      Sets 1  3  -SS      Time 1  30 sec hold  -SS         Exercise 2    Exercise Name 2  Active R levator scapula with manual stabilization of R scapula  -SS      Cueing 2  Verbal;Tactile  -SS      Sets 2  3  -SS      Time 2  30 sec hold  -SS         Exercise 3    Exercise Name 3  Active R pec stretch with manual stabilization of R scapula  -SS      Cueing 3  Verbal;Tactile  -SS      Sets 3  3  -SS      Time 3  30 sec hold  -SS         Exercise 4    Exercise Name 4  T-band resisted shoulder extension from 90 deg, table inclined 20 deg  -SS      Cueing 4  Verbal  -SS      Sets 4  1  -SS      Reps 4  15  -SS      Additional Comments  red  -SS         Exercise 5    Exercise Name 5  T-band resisted shoulder internal rotation from 90 deg, table inclined 20 deg  -SS      Cueing 5  Verbal  -SS      Sets 5  1  -SS      Reps 5  15  -SS      Additional Comments  red  -SS         Exercise 6    Exercise Name 6  Manually resisted PNF D2 extension from 90 deg, table inclined 20 deg  -SS      Cueing 6  Verbal;Tactile  -SS      Sets 6  2  -SS      Reps 6  15  -SS         Exercise 7    Exercise Name 7  Manually resisted shoulder flexion to 90 deg, table inclined 20 deg  -SS      Cueing 7  Verbal;Tactile  -SS      Sets 7  2  -SS      Reps 7  15  -SS        User Key  (r) = Recorded By, (t) = Taken By, (c) = Cosigned By    Initials Name Provider Type    Alfonzo Pat, PT DPT Physical Therapist            PT OP Goals     Row Name 12/02/20 1000          PT Short Term Goals    STG Date to Achieve  12/11/20  -     STG 1  Note a >/= 15% subjective improvement.  -SS     STG 1 Progress  Met  -SS     STG 2  QuickDASH score to be </= 70.  -SS     STG 2 Progress  Not Met;Ongoing  -SS     STG 3  Increase cervical flexion to >/= 25 deg.  -SS     STG 3 Progress  Met  -SS     STG 4  Increase R shoulder active flexion to >/= 75  deg.  -     STG 4 Progress  Not Met;Ongoing  -     STG 5  Increase R shoulder active extension to >/= 40 deg.  -     STG 5 Progress  Not Met;Ongoing  -        Long Term Goals    LTG Date to Achieve  01/05/21  -     LTG 1  Independent with HEP.  -SS     LTG 1 Progress  Not Met;Ongoing  -SS     LTG 2  QuickDASH score to be </= 50.  -SS     LTG 2 Progress  Not Met;Ongoing  -     LTG 3  R shoulder active elevation to 90 deg.  -     LTG 3 Progress  Not Met;Ongoing  -        Time Calculation    PT Goal Re-Cert Due Date  12/11/20  -       User Key  (r) = Recorded By, (t) = Taken By, (c) = Cosigned By    Initials Name Provider Type     Alfonzo Maxwell, PT DPT Physical Therapist                         Time Calculation:   Start Time: 1025  Therapy Charges for Today     Code Description Service Date Service Provider Modifiers Qty    51342079475  PT THER PROC EA 15 MIN 12/2/2020 Alfonzo Maxwell, PT DPT GP 2    97178558294 HC PT THER SUPP EA 15 MIN 12/2/2020 Alfonzo Maxwell, PT DPT GP 1                    Alfonzo Maxwell, PT, DPT, CHT  12/2/2020

## 2020-12-07 ENCOUNTER — APPOINTMENT (OUTPATIENT)
Dept: PHYSICAL THERAPY | Facility: HOSPITAL | Age: 45
End: 2020-12-07

## 2020-12-09 ENCOUNTER — HOSPITAL ENCOUNTER (OUTPATIENT)
Dept: PHYSICAL THERAPY | Facility: HOSPITAL | Age: 45
Setting detail: THERAPIES SERIES
Discharge: HOME | End: 2020-12-09

## 2020-12-09 DIAGNOSIS — M54.9 MID BACK PAIN: ICD-10-CM

## 2020-12-09 DIAGNOSIS — G89.29 CHRONIC RIGHT SHOULDER PAIN: Primary | ICD-10-CM

## 2020-12-09 DIAGNOSIS — M25.511 CHRONIC RIGHT SHOULDER PAIN: Primary | ICD-10-CM

## 2020-12-09 PROCEDURE — 97110 THERAPEUTIC EXERCISES: CPT | Performed by: PHYSICAL THERAPIST

## 2020-12-09 PROCEDURE — 97140 MANUAL THERAPY 1/> REGIONS: CPT | Performed by: PHYSICAL THERAPIST

## 2020-12-09 NOTE — THERAPY TREATMENT NOTE
"    Outpatient Physical Therapy Ortho Treatment Note  [facility]     Patient Name: Christal Holder  : 1975  MRN: [id number]  Today's Date: 2020      Visit Date: 2020  Attendance: 9/10 (45/yr on primary)  Subjective Improvement: 20%  Next MD Appt: PRN  Recert Date: 2020     Therapy Diagnosis: chronic R shoulder pain and immobility s/p UT accident     Visit Dx:    ICD-10-CM ICD-9-CM   1. Chronic right shoulder pain  M25.511 719.41    G89.29 338.29   2. Mid back pain  M54.9 724.5            Past Medical History:   Diagnosis Date   • Anxiety    • Atelectasis    • Chronic pain disorder    • Compression fracture of body of thoracic vertebra (CMS/HCC)     T6-T12   • COPD (chronic obstructive pulmonary disease) (CMS/HCC)     due to trauma   • Depression    • Diabetes mellitus (CMS/HCC)    • Glenoid fracture of shoulder    • Pneumothorax     due to rib fractures   • Rib fracture    • Scapular fracture    • Severe episode of recurrent major depressive disorder, without psychotic features (CMS/HCC)         Past Surgical History:   Procedure Laterality Date   • CHOLECYSTECTOMY     • GLENOID OPEN REDUCTION INTERNAL FIXATION Right    • SCAPULA OPEN REDUCTION INTERNAL FIXATION     • TUBAL ABDOMINAL LIGATION         PT Ortho     Row Name 20       Subjective Comments    Subjective Comments  \"Okay.\" Difficulty sleeping last night and the night before due to pain. UT feels more knotted today. Unsure why pain is elevated.  -SS       Subjective Pain    Able to rate subjective pain?  yes  -SS    Pre-Treatment Pain Level  7  -SS    Post-Treatment Pain Level  5  -SS       Cervical/Thoracic Special Tests    Cervical/Thoracic Special Tests Comments  Increased tone R UT/SCM.  -SS      User Key  (r) = Recorded By, (t) = Taken By, (c) = Cosigned By    Initials Name Provider Type    SS Alfonzo Maxwell, PT DPT Physical Therapist            PT Assessment/Plan     Row Name 20          " "PT Assessment    Functional Limitations  Limitation in home management;Limitations in community activities;Limitations in functional capacity and performance;Performance in leisure activities;Performance in self-care ADL;Performance in work activities  -     Impairments  Pain;Range of motion;Motor function;Muscle strength  -     Assessment Comments  Increased pain and guarding this date. Fair tolerance to therapy.   -     Rehab Potential  Fair barrier: chronicity, suprascapular nerve injury  -     Patient/caregiver participated in establishment of treatment plan and goals  Yes  -SS     Patient would benefit from skilled therapy intervention  Yes  -SS        PT Plan    PT Frequency  2x/week  -     Predicted Duration of Therapy Intervention (PT)  6-8 weeks  -     PT Plan Comments  Advance table incline next. Progress as tolerated.  -       User Key  (r) = Recorded By, (t) = Taken By, (c) = Cosigned By    Initials Name Provider Type    Alfonzo Pat, PT DPT Physical Therapist          Modalities     Row Name 12/09/20 0900             Moist Heat    MH Applied  Yes  -SS      Location  R shoulder and trunk  -      Rx Minutes  15 mins  -SS      MH Prior to Rx  No  -SS      MH S/P Rx  Yes  -SS        User Key  (r) = Recorded By, (t) = Taken By, (c) = Cosigned By    Initials Name Provider Type    Alfonzo Pat, PT DPT Physical Therapist        OP Exercises     Row Name 12/09/20 0900             Subjective Comments    Subjective Comments  \"Okay.\" Difficulty sleeping last night and the night before due to pain. UT feels more knotted today. Unsure why pain is elevated.  -SS         Subjective Pain    Able to rate subjective pain?  yes  -SS      Pre-Treatment Pain Level  7  -SS      Post-Treatment Pain Level  5  -SS         Exercise 1    Exercise Name 1  Active R UT with manual stabilization of R scapula  -      Cueing 1  Verbal;Tactile  -      Sets 1  3  -SS      Time 1  30 sec hold  " -SS         Exercise 2    Exercise Name 2  Active R levator scapula with manual stabilization of R scapula  -SS      Cueing 2  Verbal;Tactile  -SS      Sets 2  3  -SS      Time 2  30 sec hold  -SS         Exercise 3    Exercise Name 3  Active R pec stretch with manual stabilization of R scapula  -SS      Cueing 3  Verbal;Tactile  -SS      Sets 3  3  -SS      Time 3  30 sec hold  -SS         Exercise 4    Exercise Name 4  MFR and manual stretching R shoulder and cervical spine  -SS      Time 4  10 mins  -        User Key  (r) = Recorded By, (t) = Taken By, (c) = Cosigned By    Initials Name Provider Type    Alfonzo Pat, PT DPT Physical Therapist              PT OP Goals     Row Name 12/09/20 0900          PT Short Term Goals    STG Date to Achieve  12/11/20  -     STG 1  Note a >/= 15% subjective improvement.  -SS     STG 1 Progress  Met  -SS     STG 2  QuickDASH score to be </= 70.  -SS     STG 2 Progress  Not Met;Ongoing  -SS     STG 3  Increase cervical flexion to >/= 25 deg.  -SS     STG 3 Progress  Met  -SS     STG 4  Increase R shoulder active flexion to >/= 75 deg.  -SS     STG 4 Progress  Not Met;Ongoing  -     STG 5  Increase R shoulder active extension to >/= 40 deg.  -SS     STG 5 Progress  Not Met;Ongoing  -        Long Term Goals    LTG Date to Achieve  01/05/21  -     LTG 1  Independent with HEP.  -SS     LTG 1 Progress  Not Met;Ongoing  -     LTG 2  QuickDASH score to be </= 50.  -SS     LTG 2 Progress  Not Met;Ongoing  -     LTG 3  R shoulder active elevation to 90 deg.  -     LTG 3 Progress  Not Met;Ongoing  -        Time Calculation    PT Goal Re-Cert Due Date  12/11/20  -       User Key  (r) = Recorded By, (t) = Taken By, (c) = Cosigned By    Initials Name Provider Type    Alfonzo Pat, PT DPT Physical Therapist        Time Calculation:   Start Time: 0939  Stop Time: 1027  Time Calculation (min): 48 min  Therapy Charges for Today     Code Description  Service Date Service Provider Modifiers Qty    82183248873 HC PT THER PROC EA 15 MIN 12/9/2020 Alfonzo Maxwell, PT DPT GP 1    87848838496 HC PT MANUAL THERAPY EA 15 MIN 12/9/2020 Alfonzo Maxwell, PT DPT GP 1    77381808124 HC PT THER SUPP EA 15 MIN 12/9/2020 Alfonzo Maxwell, PT DPT GP 1                    Alfonzo Maxwell, PT, DPT, CHT  12/9/2020

## 2020-12-14 ENCOUNTER — HOSPITAL ENCOUNTER (OUTPATIENT)
Dept: PHYSICAL THERAPY | Facility: HOSPITAL | Age: 45
Setting detail: THERAPIES SERIES
Discharge: HOME | End: 2020-12-14

## 2020-12-14 DIAGNOSIS — M54.9 MID BACK PAIN: ICD-10-CM

## 2020-12-14 DIAGNOSIS — G89.29 CHRONIC RIGHT SHOULDER PAIN: Primary | ICD-10-CM

## 2020-12-14 DIAGNOSIS — M25.511 CHRONIC RIGHT SHOULDER PAIN: Primary | ICD-10-CM

## 2020-12-14 PROCEDURE — 97110 THERAPEUTIC EXERCISES: CPT | Performed by: PHYSICAL THERAPIST

## 2020-12-15 NOTE — THERAPY PROGRESS REPORT/RE-CERT
"    Outpatient Physical Therapy Ortho Progress Note  [facility]     Patient Name: Christal Holder  : 1975  MRN: [id number]  Today's Date: 2020      Visit Date: 2020  Attendance: 10/11 (45/yr on primary)  Subjective Improvement: 25%  Next MD Appt: PRN  Recert Date: 21     Therapy Diagnosis: chronic R shoulder pain and immobility s/p UT accident     Changes in Medications: none  Changes in MD Orders: none  Number of Work Days Lost: n/a       Past Medical History:   Diagnosis Date   • Anxiety    • Atelectasis    • Chronic pain disorder    • Compression fracture of body of thoracic vertebra (CMS/HCC)     T6-T12   • COPD (chronic obstructive pulmonary disease) (CMS/HCC)     due to trauma   • Depression    • Diabetes mellitus (CMS/HCC)    • Glenoid fracture of shoulder    • Pneumothorax     due to rib fractures   • Rib fracture    • Scapular fracture    • Severe episode of recurrent major depressive disorder, without psychotic features (CMS/HCC)         Past Surgical History:   Procedure Laterality Date   • CHOLECYSTECTOMY     • GLENOID OPEN REDUCTION INTERNAL FIXATION Right    • SCAPULA OPEN REDUCTION INTERNAL FIXATION     • TUBAL ABDOMINAL LIGATION         Visit Dx:     ICD-10-CM ICD-9-CM   1. Chronic right shoulder pain  M25.511 719.41    G89.29 338.29   2. Mid back pain  M54.9 724.5             PT Ortho     Row Name 20 1400       Subjective Comments    Subjective Comments  Struggling to get right hand to her head to help fix her hair lately. Shoulder is very stiff. Difficulty sleeping due to pain and stiffness. Catching in R CT junction with R rotation. R UT feels swollen. Has bought a \"back stretcher\" that she lays on for stretching her back. 25% subjective improvement. No medication changes.  -SS       Subjective Pain    Able to rate subjective pain?  yes  -SS    Pre-Treatment Pain Level  6  -SS    Post-Treatment Pain Level  6  -SS       Cervical/Thoracic Special Tests    " "Cervical/Thoracic Special Tests Comments  Rounded shoulder posture. R scapula elevated. Increased tone R UT and levator scapula.  -SS       Head/Neck/Trunk    Neck Extension AROM  45 deg  -SS    Neck Flexion AROM  25 deg; \"I can feel it pull.\"  -SS    Neck Lt Lateral Flexion AROM  28 deg, \"stiff\"  -SS    Neck Rt Lateral Flexion AROM  20 deg. \"stiff\"  -SS    Neck Lt Rotation AROM  23 deg, pain and pulling on R shoulder and thoracic spine  -SS    Neck Rt Rotation AROM  32 deg, pain and pulling on R shoulder and thoracic spine  -SS       Right Upper Ext    Rt Shoulder Abduction AROM  80 deg, seated; 75 deg, supine with table elevated 20 deg  -SS    Rt Shoulder Extension AROM  22 deg  -SS    Rt Shoulder Flexion AROM  76 deg, seated; 106 deg, supine with table elevated 20 deg  -SS    Rt Shoulder External Rotation AROM  65 deg; 90/90 position with table inclined 20 deg  -SS    Rt Shoulder External Rotation PROM  40 deg; 90/90 position with table inclined 20 deg  -SS      User Key  (r) = Recorded By, (t) = Taken By, (c) = Cosigned By    Initials Name Provider Type    SS Alfonzo Maxwell, PT DPT Physical Therapist            Therapy Education  Education Details: progress, plan  How Provided: Verbal  Provided to: Patient  Level of Understanding: Verbalized     PT OP Goals     Row Name 12/14/20 1400          PT Short Term Goals    STG Date to Achieve  01/04/20  -     STG 1  Note a >/= 15% subjective improvement.  -     STG 1 Progress  Met  -     STG 2  QuickDASH score to be </= 70.  -     STG 2 Progress  Not Met;Ongoing  -     STG 3  Increase cervical flexion to >/= 25 deg.  -     STG 3 Progress  Met  -     STG 4  Increase R shoulder active flexion to >/= 75 deg.  -     STG 4 Progress  Not Met;Ongoing  -     STG 5  Increase R shoulder active extension to >/= 40 deg.  -     STG 5 Progress  Not Met;Ongoing  -        Long Term Goals    LTG Date to Achieve  12/14/62  -     LTG 1  Independent with " HEP.  -     LTG 1 Progress  Not Met;Ongoing  -SS     LTG 2  QuickDASH score to be </= 50.  -     LTG 2 Progress  Not Met;Ongoing  -SS     LTG 3  R shoulder active elevation to 90 deg.  -     LTG 3 Progress  Not Met;Ongoing  -SS        Time Calculation    PT Goal Re-Cert Due Date  01/04/21  -       User Key  (r) = Recorded By, (t) = Taken By, (c) = Cosigned By    Initials Name Provider Type     Alfonzo Maxwell, PT DPT Physical Therapist          PT Assessment/Plan     Row Name 12/14/20 1400          PT Assessment    Functional Limitations  Limitation in home management;Limitations in community activities;Limitations in functional capacity and performance;Performance in leisure activities;Performance in self-care ADL;Performance in work activities  -     Impairments  Pain;Range of motion;Motor function;Muscle strength  -     Assessment Comments  Improved abduction against gravity this date. Continues to have increased tone in the right UT. Fair tolerance.  -     Rehab Potential  Fair barrier: chronicity, suprascapular nerve injury  -     Patient/caregiver participated in establishment of treatment plan and goals  Yes  -     Patient would benefit from skilled therapy intervention  Yes  -        PT Plan    PT Frequency  2x/week  -     Predicted Duration of Therapy Intervention (PT)  4-6 weeks with further to be determined  -     PT Plan Comments  A/AAROM, active stretching, manual therapy, MHP at conclusion of treatment, combo US/estim to R UT if continues to be spasmed.  -       User Key  (r) = Recorded By, (t) = Taken By, (c) = Cosigned By    Initials Name Provider Type     Alfonzo Maxwell, PT DPT Physical Therapist          Modalities     Row Name 12/14/20 1400             Moist Heat    MH Applied  Yes  -SS      Location  R shoulder and trunk  -      Rx Minutes  15 mins  -SS      MH Prior to Rx  No  -SS      MH S/P Rx  Yes  -        User Key  (r) = Recorded By, (t) =  "Taken By, (c) = Cosigned By    Initials Name Provider Type    SS Alfonzo Maxwell, PT DPT Physical Therapist        OP Exercises     Row Name 12/14/20 1400             Subjective Comments    Subjective Comments  Struggling to get right hand to her head to help fix her hair lately. Shoulder is very stiff. Difficulty sleeping due to pain and stiffness. Catching in R CT junction with R rotation. R UT feels swollen. Has bought a \"back stretcher\" that she lays on for stretching her back. 25% subjective improvement. No medication changes.  -SS         Subjective Pain    Able to rate subjective pain?  yes  -SS      Pre-Treatment Pain Level  6  -SS      Post-Treatment Pain Level  6  -SS         Exercise 1    Exercise Name 1  recheck  -SS         Exercise 2    Exercise Name 2  AROM shoulder flexion, table inclined 20 deg  -SS      Cueing 2  Verbal  -SS      Reps 2  30  -SS         Exercise 3    Exercise Name 3  AROM shoulder abduction, table inclined 20 deg  -SS      Cueing 3  Verbal  -SS      Reps 3  30  -SS         Exercise 4    Exercise Name 4  T-band resisted shoulder extension from 90 deg, table inclined 20 deg  -SS      Cueing 4  Verbal  -SS      Sets 4  2  -SS      Reps 4  10  -SS      Additional Comments  red  -SS         Exercise 5    Exercise Name 5  Active R UT with manual stabilization of R scapula  -SS      Cueing 5  Verbal;Tactile  -SS      Sets 5  3  -SS      Time 5  30 sec hold  -SS         Exercise 6    Exercise Name 6  Active R UT with manual stabilization of R scapula  -SS      Cueing 6  Verbal;Tactile  -SS      Sets 6  3  -SS      Time 6  30 sec hold  -SS         Exercise 7    Exercise Name 7  Active R pec stretch with manual stabilization of R scapula  -SS      Cueing 7  Verbal;Tactile  -SS      Sets 7  3  -SS      Time 7  30 sec hold  -SS         Exercise 8    Exercise Name 8  MFR R UT  -SS      Cueing 8  Tactile  -SS      Time 8  7 mins  -SS        User Key  (r) = Recorded By, (t) = Taken By, (c) " = Cosigned By    Initials Name Provider Type    SS Alfonzo Maxewll, PT DPT Physical Therapist         Time Calculation:     Start Time: 1430  Stop Time: 1533  Time Calculation (min): 63 min  Total Timed Code Minutes- PT: 45 minute(s)     Therapy Charges for Today     Code Description Service Date Service Provider Modifiers Qty    04743172772 HC PT THER PROC EA 15 MIN 12/14/2020 Alfonzo Maxwell, PT DPT GP 3    87506796087 HC PT THER SUPP EA 15 MIN 12/14/2020 Alfonzo Maxwell, PT DPT GP 1                    Alfonzo Maxwell, PT, DPT, CHT  12/14/2020

## 2020-12-16 ENCOUNTER — HOSPITAL ENCOUNTER (OUTPATIENT)
Dept: PHYSICAL THERAPY | Facility: HOSPITAL | Age: 45
Setting detail: THERAPIES SERIES
Discharge: HOME | End: 2020-12-16

## 2020-12-16 DIAGNOSIS — M54.9 MID BACK PAIN: ICD-10-CM

## 2020-12-16 DIAGNOSIS — M25.511 RIGHT SHOULDER PAIN, UNSPECIFIED CHRONICITY: ICD-10-CM

## 2020-12-16 DIAGNOSIS — M25.511 CHRONIC RIGHT SHOULDER PAIN: Primary | ICD-10-CM

## 2020-12-16 DIAGNOSIS — G89.29 CHRONIC RIGHT SHOULDER PAIN: Primary | ICD-10-CM

## 2020-12-16 PROCEDURE — 97110 THERAPEUTIC EXERCISES: CPT | Performed by: PHYSICAL THERAPIST

## 2020-12-16 NOTE — THERAPY TREATMENT NOTE
Outpatient Physical Therapy Ortho Treatment Note  [facility]     Patient Name: Christal Holder  : 1975  MRN: [id number]  Today's Date: 2020      Visit Date: 2020  Attendance:  (45/yr on primary)  Subjective Improvement: 25%  Next MD Appt: PRN  Recert Date: 21     Therapy Diagnosis: chronic R shoulder pain and immobility s/p UT accident     Visit Dx:    ICD-10-CM ICD-9-CM   1. Chronic right shoulder pain  M25.511 719.41    G89.29 338.29   2. Mid back pain  M54.9 724.5   3. Right shoulder pain, unspecified chronicity  M25.511 719.41            Past Medical History:   Diagnosis Date   • Anxiety    • Atelectasis    • Chronic pain disorder    • Compression fracture of body of thoracic vertebra (CMS/HCC)     T6-T12   • COPD (chronic obstructive pulmonary disease) (CMS/HCC)     due to trauma   • Depression    • Diabetes mellitus (CMS/HCC)    • Glenoid fracture of shoulder    • Pneumothorax     due to rib fractures   • Rib fracture    • Scapular fracture    • Severe episode of recurrent major depressive disorder, without psychotic features (CMS/HCC)         Past Surgical History:   Procedure Laterality Date   • CHOLECYSTECTOMY     • GLENOID OPEN REDUCTION INTERNAL FIXATION Right    • SCAPULA OPEN REDUCTION INTERNAL FIXATION     • TUBAL ABDOMINAL LIGATION         PT Ortho     Row Name 20 1100       Subjective Comments    Subjective Comments  Put a muscle rub on her shoulder  after therapy session. She thinks that the capsaicin cream helped to relax her shoulder.   -SS       Subjective Pain    Able to rate subjective pain?  yes  -SS    Pre-Treatment Pain Level  6  -SS    Post-Treatment Pain Level  4  -SS       Cervical/Thoracic Special Tests    Cervical/Thoracic Special Tests Comments  Rounded shoulder posture. Increased tone R UT and levator scapula.  -SS       Shoulder Impingement/Rotator Cuff Special Tests    Shoulder Impingement/Rotator Cuff Special Tests Comments   Palpable crepitus/popping in R shoulder with flexion and abduction.   -SS       Right Upper Ext    Rt Shoulder Abduction AROM  77 deg with table inclined 30 deg  -SS    Rt Shoulder Extension AROM  29 deg  -SS    Rt Shoulder Flexion AROM  80 deg with table inclined 30 deg  -SS    Rt Shoulder External Rotation AROM  60 deg, 90/90 with table inclined 30 deg  -SS      User Key  (r) = Recorded By, (t) = Taken By, (c) = Cosigned By    Initials Name Provider Type    Alfonzo Pat, PT DPT Physical Therapist                PT Assessment/Plan     Row Name 12/16/20 1100          PT Assessment    Functional Limitations  Limitation in home management;Limitations in community activities;Limitations in functional capacity and performance;Performance in leisure activities;Performance in self-care ADL;Performance in work activities  -     Impairments  Pain;Range of motion;Motor function;Muscle strength  -     Assessment Comments  Tolerated raising head of table up to 30 deg well this date.   -     Rehab Potential  Fair barrier: chronicity, suprascapular nerve injury  -     Patient/caregiver participated in establishment of treatment plan and goals  Yes  -SS     Patient would benefit from skilled therapy intervention  Yes  -SS        PT Plan    PT Frequency  2x/week  -     Predicted Duration of Therapy Intervention (PT)  5-6 weeks with further to be determined  -     PT Plan Comments  Continue POC. Advance as tolerated.  -       User Key  (r) = Recorded By, (t) = Taken By, (c) = Cosigned By    Initials Name Provider Type    Alfonzo Pat, PT DPT Physical Therapist          Modalities     Row Name 12/16/20 1100             Moist Heat    MH Applied  Yes  -SS      Location  R shoulder and trunk  -      Rx Minutes  12 mins  -SS      MH Prior to Rx  No  -SS      MH S/P Rx  Yes  -SS        User Key  (r) = Recorded By, (t) = Taken By, (c) = Cosigned By    Initials Name Provider Type    EDUARDA Maxwell  Alfonzo Pearson, PT DPT Physical Therapist        OP Exercises     Row Name 12/16/20 1100             Subjective Comments    Subjective Comments  Put a muscle rub on her shoulder 12/14 after therapy session. She thinks that the capsaicin cream helped to relax her shoulder.   -SS         Subjective Pain    Able to rate subjective pain?  yes  -SS      Pre-Treatment Pain Level  6  -SS      Post-Treatment Pain Level  4  -SS         Exercise 1    Exercise Name 1  AAROM shoulder flexion with table inclined 30 deg  -SS      Cueing 1  Verbal;Tactile  -SS      Sets 1  1  -SS      Reps 1  30  -SS         Exercise 2    Exercise Name 2  AAROM shoulder abduction with table inclined 30 deg  -SS      Cueing 2  Verbal;Tactile  -SS      Sets 2  1  -SS      Reps 2  30  -SS         Exercise 3    Exercise Name 3  AAROM shoulder 90/90 ER with table inclined 30 deg  -SS      Cueing 3  Verbal;Tactile  -SS      Sets 3  1  -SS      Reps 3  30  -SS         Exercise 4    Exercise Name 4  AROM shoulder flexion with table inclined 30 deg  -SS      Cueing 4  Verbal  -SS      Sets 4  1  -SS      Reps 4  15  -SS         Exercise 5    Exercise Name 5  AROM shoulder abduction with table inclined 30 deg  -SS      Cueing 5  Verbal  -SS      Sets 5  1  -SS      Reps 5  15  -SS         Exercise 6    Exercise Name 6  AROM shoulder 90/90 ER with table inclined 30 deg  -SS      Cueing 6  Verbal  -SS      Sets 6  1  -SS      Reps 6  15  -SS         Exercise 7    Exercise Name 7  Manually resisted shoulder extension to neutral with table inclined 30 deg  -SS      Cueing 7  Verbal;Tactile  -SS      Sets 7  2  -SS      Reps 7  10  -SS         Exercise 8    Exercise Name 8  Standing AA shoulder extension  -SS      Cueing 8  Verbal;Tactile  -SS      Sets 8  2  -SS      Reps 8  30  -SS         Exercise 9    Exercise Name 9  Standing active shoulder extension  -SS      Cueing 9  Verbal  -SS      Sets 9  1  -SS      Reps 9  15  -SS        User Key  (r) = Recorded By,  (t) = Taken By, (c) = Cosigned By    Initials Name Provider Type    Alfonzo Pat, PT DPT Physical Therapist            PT OP Goals     Row Name 12/16/20 1100          PT Short Term Goals    STG Date to Achieve  01/04/20  -     STG 1  Note a >/= 15% subjective improvement.  -SS     STG 1 Progress  Met  -SS     STG 2  QuickDASH score to be </= 70.  -SS     STG 2 Progress  Not Met;Ongoing  -SS     STG 3  Increase cervical flexion to >/= 25 deg.  -SS     STG 3 Progress  Met  -SS     STG 4  Increase R shoulder active flexion to >/= 75 deg.  -SS     STG 4 Progress  Not Met;Ongoing  -SS     STG 5  Increase R shoulder active extension to >/= 40 deg.  -SS     STG 5 Progress  Not Met;Ongoing  -SS        Long Term Goals    LTG Date to Achieve  12/14/62  -SS     LTG 1  Independent with HEP.  -SS     LTG 1 Progress  Not Met;Ongoing  -SS     LTG 2  QuickDASH score to be </= 50.  -SS     LTG 2 Progress  Not Met;Ongoing  -SS     LTG 3  R shoulder active elevation to 90 deg.  -SS     LTG 3 Progress  Not Met;Ongoing  -        Time Calculation    PT Goal Re-Cert Due Date  01/04/21  -       User Key  (r) = Recorded By, (t) = Taken By, (c) = Cosigned By    Initials Name Provider Type    Alfonzo Pat, PT DPT Physical Therapist                         Time Calculation:   Start Time: 1110  Stop Time: 1200  Time Calculation (min): 50 min  Therapy Charges for Today     Code Description Service Date Service Provider Modifiers Qty    71935546803 HC PT THER PROC EA 15 MIN 12/16/2020 Alfonzo Maxwell, PT DPT GP 2    92434130129 HC PT THER SUPP EA 15 MIN 12/16/2020 Alfonzo Maxwell, PT DPT GP 1                    Alfonzo Maxwell, PT, DPT, CHT  12/16/2020

## 2020-12-22 ENCOUNTER — HOSPITAL ENCOUNTER (OUTPATIENT)
Dept: PHYSICAL THERAPY | Facility: HOSPITAL | Age: 45
Setting detail: THERAPIES SERIES
Discharge: HOME | End: 2020-12-22

## 2020-12-22 DIAGNOSIS — M25.511 CHRONIC RIGHT SHOULDER PAIN: Primary | ICD-10-CM

## 2020-12-22 DIAGNOSIS — M54.9 MID BACK PAIN: ICD-10-CM

## 2020-12-22 DIAGNOSIS — G89.29 CHRONIC RIGHT SHOULDER PAIN: Primary | ICD-10-CM

## 2020-12-22 PROCEDURE — 97110 THERAPEUTIC EXERCISES: CPT | Performed by: PHYSICAL THERAPIST

## 2020-12-22 PROCEDURE — 97140 MANUAL THERAPY 1/> REGIONS: CPT | Performed by: PHYSICAL THERAPIST

## 2020-12-22 NOTE — THERAPY TREATMENT NOTE
"    Outpatient Physical Therapy Ortho Treatment Note  [facility]     Patient Name: Christal Holder  : 1975  MRN: [id number]  Today's Date: 2020      Visit Date: 2020  Attendance:  (45/yr on primary)  Subjective Improvement: 25%  Next MD Appt: PRN  Recert Date: 21     Therapy Diagnosis: chronic R shoulder pain and immobility s/p UT accident     Visit Dx:    ICD-10-CM ICD-9-CM   1. Chronic right shoulder pain  M25.511 719.41    G89.29 338.29   2. Mid back pain  M54.9 724.5            Past Medical History:   Diagnosis Date   • Anxiety    • Atelectasis    • Chronic pain disorder    • Compression fracture of body of thoracic vertebra (CMS/HCC)     T6-T12   • COPD (chronic obstructive pulmonary disease) (CMS/HCC)     due to trauma   • Depression    • Diabetes mellitus (CMS/HCC)    • Glenoid fracture of shoulder    • Pneumothorax     due to rib fractures   • Rib fracture    • Scapular fracture    • Severe episode of recurrent major depressive disorder, without psychotic features (CMS/HCC)         Past Surgical History:   Procedure Laterality Date   • CHOLECYSTECTOMY     • GLENOID OPEN REDUCTION INTERNAL FIXATION Right    • SCAPULA OPEN REDUCTION INTERNAL FIXATION     • TUBAL ABDOMINAL LIGATION         PT Ortho     Row Name 20 1000       Subjective Comments    Subjective Comments  \"I'm okay. Feel pretty sore or stiff, I don't know how to put it.\" Feels swollen in UT/levator scapula region.   -SS       Subjective Pain    Able to rate subjective pain?  yes  -SS    Pre-Treatment Pain Level  6  -SS    Post-Treatment Pain Level  4  -SS       Cervical/Thoracic Special Tests    Cervical/Thoracic Special Tests Comments  Increased tone R levator scapula  -SS       Right Upper Ext    Rt Shoulder Abduction AROM  97 deg  -SS    Rt Shoulder Extension AROM  20 deg  -SS    Rt Shoulder Flexion AROM  85 deg  -SS      User Key  (r) = Recorded By, (t) = Taken By, (c) = Cosigned By    Initials Name " "Provider Type     Alfonzo Maxwell, PT DPT Physical Therapist            PT Assessment/Plan     Row Name 12/22/20 1000          PT Assessment    Functional Limitations  Limitation in home management;Limitations in community activities;Limitations in functional capacity and performance;Performance in leisure activities;Performance in self-care ADL;Performance in work activities  -     Impairments  Pain;Range of motion;Motor function;Muscle strength  -     Assessment Comments  Improved shoulder flexion and abduction but decreased shoulder extension AROM this date. Sore with therex. Good effort.  -     Rehab Potential  Fair barrier: chronicity, suprascapular nerve injury  -     Patient/caregiver participated in establishment of treatment plan and goals  Yes  -SS     Patient would benefit from skilled therapy intervention  Yes  -SS        PT Plan    PT Frequency  2x/week  -     Predicted Duration of Therapy Intervention (PT)  5-6 weeks with further to be determined  -     PT Plan Comments  Continue POC. Attempt wall pushout with hands low next.  -       User Key  (r) = Recorded By, (t) = Taken By, (c) = Cosigned By    Initials Name Provider Type     Alfonzo Maxwell, PT DPT Physical Therapist          Modalities     Row Name 12/22/20 1000             Moist Heat    MH Applied  Yes  -SS      Location  R shoulder and trunk  -      Rx Minutes  15 mins  -SS      MH Prior to Rx  No  -SS      MH S/P Rx  Yes  -SS        User Key  (r) = Recorded By, (t) = Taken By, (c) = Cosigned By    Initials Name Provider Type     Alfonzo Maxwell, PT DPT Physical Therapist        OP Exercises     Row Name 12/22/20 1000             Subjective Comments    Subjective Comments  \"I'm okay. Feel pretty sore or stiff, I don't know how to put it.\" Feels swollen in UT/levator scapula region.   -         Subjective Pain    Able to rate subjective pain?  yes  -SS      Pre-Treatment Pain Level  6  -SS      " Post-Treatment Pain Level  4  -SS         Exercise 1    Exercise Name 1  Wall walks - flexion  -SS      Cueing 1  Verbal;Demo  -SS      Sets 1  1  -SS      Reps 1  15  -SS         Exercise 2    Exercise Name 2  AAROM ball raises  -SS      Cueing 2  Verbal;Demo  -SS      Sets 2  1  -SS      Reps 2  10  -SS         Exercise 3    Exercise Name 3  Underhand ball toss - 2 hands  -SS      Cueing 3  Verbal;Demo  -SS      Sets 3  1  -SS      Reps 3  5  -SS         Exercise 4    Exercise Name 4  Underhand ball toss - R  -SS      Cueing 4  Verbal  -SS      Sets 4  1  -SS      Reps 4  10  -SS         Exercise 5    Exercise Name 5  Low sidearm ball toss  -SS      Cueing 5  Verbal;Demo  -SS      Sets 5  1  -SS      Reps 5  10  -SS         Exercise 6    Exercise Name 6  Ball press-ups  -SS      Cueing 6  Verbal;Demo  -SS      Sets 6  1  -SS      Reps 6  10  -SS         Exercise 7    Exercise Name 7  Manual pec/biceps stretch  -SS      Cueing 7  Verbal;Tactile  -SS      Sets 7  3  -SS      Time 7  30 sec hold  -SS         Exercise 8    Exercise Name 8  Manually resisted shoulder extension  -SS      Cueing 8  Verbal;Tactile  -SS      Sets 8  2  -SS      Reps 8  20  -SS         Exercise 9    Exercise Name 9  see Manual  -SS         Exercise 10    Exercise Name 10  see Modalities  -SS        User Key  (r) = Recorded By, (t) = Taken By, (c) = Cosigned By    Initials Name Provider Type    Alfonzo Pat, PT DPT Physical Therapist             Manual Rx (last 36 hours)      Manual Treatments     Row Name 12/22/20 0900             Manual Rx 1    Manual Rx 1 Location  R UT and levator scapula  -SS      Manual Rx 1 Type  MFR  -SS      Manual Rx 1 Duration  10 mins  -SS        User Key  (r) = Recorded By, (t) = Taken By, (c) = Cosigned By    Initials Name Provider Type    Alfonzo Pat, PT DPT Physical Therapist          PT OP Goals     Row Name 12/22/20 1000          PT Short Term Goals    STG Date to Achieve   01/04/20  -SS     STG 1  Note a >/= 15% subjective improvement.  -SS     STG 1 Progress  Met  -SS     STG 2  QuickDASH score to be </= 70.  -SS     STG 2 Progress  Not Met;Ongoing  -SS     STG 3  Increase cervical flexion to >/= 25 deg.  -SS     STG 3 Progress  Met  -SS     STG 4  Increase R shoulder active flexion to >/= 75 deg.  -SS     STG 4 Progress  Not Met;Ongoing  -SS     STG 5  Increase R shoulder active extension to >/= 40 deg.  -SS     STG 5 Progress  Not Met;Ongoing  -        Long Term Goals    LTG Date to Achieve  12/14/62  -SS     LTG 1  Independent with HEP.  -SS     LTG 1 Progress  Not Met;Ongoing  -SS     LTG 2  QuickDASH score to be </= 50.  -SS     LTG 2 Progress  Not Met;Ongoing  -SS     LTG 3  R shoulder active elevation to 90 deg.  -SS     LTG 3 Progress  Not Met;Ongoing  -SS        Time Calculation    PT Goal Re-Cert Due Date  01/04/21  -       User Key  (r) = Recorded By, (t) = Taken By, (c) = Cosigned By    Initials Name Provider Type     Alfonzo Maxwell, PT DPT Physical Therapist          Therapy Education  Education Details: manual release UT/levator scapula  Given: HEP  How Provided: Verbal, Demonstration  Provided to: Patient  Level of Understanding: Verbalized, Demonstrated              Time Calculation:   Start Time: 1024  Stop Time: 1110  Time Calculation (min): 46 min  Total Timed Code Minutes- PT: 31 minute(s)  Therapy Charges for Today     Code Description Service Date Service Provider Modifiers Qty    65155026749 HC PT MANUAL THERAPY EA 15 MIN 12/22/2020 Alfonzo Maxwell, PT DPT GP 1    00441791648 HC PT THER PROC EA 15 MIN 12/22/2020 Alfonzo Maxwell, PT DPT GP 1    11997412633 HC PT THER SUPP EA 15 MIN 12/22/2020 Alfonzo Maxwell, PT DPT GP 1                    Alfonzo Maxwell, PT, DPT, CHT  12/22/2020

## 2020-12-29 ENCOUNTER — HOSPITAL ENCOUNTER (OUTPATIENT)
Dept: PHYSICAL THERAPY | Facility: HOSPITAL | Age: 45
Setting detail: THERAPIES SERIES
Discharge: HOME | End: 2020-12-29

## 2020-12-29 DIAGNOSIS — M25.511 CHRONIC RIGHT SHOULDER PAIN: Primary | ICD-10-CM

## 2020-12-29 DIAGNOSIS — G89.29 CHRONIC RIGHT SHOULDER PAIN: Primary | ICD-10-CM

## 2020-12-29 DIAGNOSIS — M25.511 RIGHT SHOULDER PAIN, UNSPECIFIED CHRONICITY: ICD-10-CM

## 2020-12-29 DIAGNOSIS — M54.9 MID BACK PAIN: ICD-10-CM

## 2020-12-29 PROCEDURE — 97110 THERAPEUTIC EXERCISES: CPT | Performed by: PHYSICAL THERAPIST

## 2020-12-29 NOTE — THERAPY TREATMENT NOTE
"    Outpatient Physical Therapy Ortho Treatment Note  [facility]     Patient Name: Christal Holder  : 1975  MRN: [id number]  Today's Date: 2020      Visit Date: 2020  Attendance: 14 (45/yr on primary)  Subjective Improvement: 25%  Next MD Appt: PRN  Recert Date: 21     Therapy Diagnosis: chronic R shoulder pain and immobility s/p UT accident     Visit Dx:    ICD-10-CM ICD-9-CM   1. Chronic right shoulder pain  M25.511 719.41    G89.29 338.29   2. Mid back pain  M54.9 724.5   3. Right shoulder pain, unspecified chronicity  M25.511 719.41            Past Medical History:   Diagnosis Date   • Anxiety    • Atelectasis    • Chronic pain disorder    • Compression fracture of body of thoracic vertebra (CMS/HCC)     T6-T12   • COPD (chronic obstructive pulmonary disease) (CMS/HCC)     due to trauma   • Depression    • Diabetes mellitus (CMS/HCC)    • Glenoid fracture of shoulder    • Pneumothorax     due to rib fractures   • Rib fracture    • Scapular fracture    • Severe episode of recurrent major depressive disorder, without psychotic features (CMS/HCC)         Past Surgical History:   Procedure Laterality Date   • CHOLECYSTECTOMY     • GLENOID OPEN REDUCTION INTERNAL FIXATION Right    • SCAPULA OPEN REDUCTION INTERNAL FIXATION     • TUBAL ABDOMINAL LIGATION         PT Ortho     Row Name 20 1100       Subjective Comments    Subjective Comments  Right shoulder is \"tight and jacked and feels awful.\" Reports that the right shoulder is catching. Pain is most at cervicothoracic junction. Was riding on a cart on a hoverboard on  when she fell off while making a turn due to the seat not being on well. Hit her elbow on the concrete floor, and could feel it all the way up. 35% subjective improvement.  -SS       Subjective Pain    Able to rate subjective pain?  yes  -SS    Pre-Treatment Pain Level  7  -SS       Cervical/Thoracic Special Tests    Cervical/Thoracic Special Tests Comments "  Swelling noted in cervicothoracic junction.  -SS       Right Upper Ext    Rt Shoulder Abduction AROM  75 deg, seated  -SS    Rt Shoulder Extension AROM  32 deg  -SS    Rt Shoulder Flexion AROM  95 deg, seated  -SS      User Key  (r) = Recorded By, (t) = Taken By, (c) = Cosigned By    Initials Name Provider Type    Alfonzo Pat, PT DPT Physical Therapist                      PT Assessment/Plan     Row Name 12/29/20 1100          PT Assessment    Functional Limitations  Limitation in home management;Limitations in community activities;Limitations in functional capacity and performance;Performance in leisure activities;Performance in self-care ADL;Performance in work activities  -     Impairments  Pain;Range of motion;Motor function;Muscle strength  -     Assessment Comments  Decreased abduction but increased flexion and extension compared to last therapy session. Pain probably related to fall from sitting while riding a scooter 3 days ago.  -     Rehab Potential  Fair barrier: chronicity, suprascapular nerve injury  -     Patient/caregiver participated in establishment of treatment plan and goals  Yes  -     Patient would benefit from skilled therapy intervention  Yes  -SS        PT Plan    PT Frequency  2x/week  -     Predicted Duration of Therapy Intervention (PT)  5-6 weeks with further to be determined  -     PT Plan Comments  Recheck early next week. Progress ROM and strength as tolerated.  -       User Key  (r) = Recorded By, (t) = Taken By, (c) = Cosigned By    Initials Name Provider Type    Alfonzo Pat, PT DPT Physical Therapist          Modalities     Row Name 12/29/20 1100             Moist Heat    MH Applied  Yes  -SS      Location  R shoulder and trunk  -      Rx Minutes  15 mins  -      MH Prior to Rx  No  -SS      MH S/P Rx  Yes  -SS        User Key  (r) = Recorded By, (t) = Taken By, (c) = Cosigned By    Initials Name Provider Type    Alfonzo Pat  "Michel, PT DPT Physical Therapist        OP Exercises     Row Name 12/29/20 1100             Subjective Comments    Subjective Comments  Right shoulder is \"tight and jacked and feels awful.\" Reports that the right shoulder is catching. Pain is most at cervicothoracic junction. Was riding on a cart on a hoverboard on 12/26 when she fell off while making a turn due to the seat not being on well. Hit her elbow on the concrete floor, and could feel it all the way up. 35% subjective improvement.  -SS         Subjective Pain    Able to rate subjective pain?  yes  -SS      Pre-Treatment Pain Level  7  -SS      Post-Treatment Pain Level  6  -SS         Exercise 1    Exercise Name 1  Active shoulder flexion with table inclined 35 deg  -SS      Cueing 1  Verbal  -SS      Sets 1  3  -SS      Reps 1  10  -SS         Exercise 2    Exercise Name 2  Active shoulder abduction with table inclined 35 deg  -SS      Cueing 2  Verbal  -SS      Sets 2  3  -SS      Reps 2  10  -SS         Exercise 3    Exercise Name 3  Isometric shoulder extension into table with table inclined 35 deg  -SS      Cueing 3  Verbal  -SS      Reps 3  x10, x15  -SS      Time 3  5 sec hold  -SS         Exercise 4    Exercise Name 4  Eccentric strengthening R shoulder flexion with table inclined 35 deg  -SS      Cueing 4  Verbal;Demo  -SS      Sets 4  2  -SS      Reps 4  10  -SS      Additional Comments  yellow T-band  -SS         Exercise 5    Exercise Name 5  check AROM  -SS         Exercise 6    Exercise Name 6  see Modalities  -SS        User Key  (r) = Recorded By, (t) = Taken By, (c) = Cosigned By    Initials Name Provider Type    SS Alfonzo Maxwell, MICHAELA DPT Physical Therapist                       PT OP Goals     Row Name 12/29/20 1100          PT Short Term Goals    STG Date to Achieve  01/04/20  -SS     STG 1  Note a >/= 15% subjective improvement.  -SS     STG 1 Progress  Met  -SS     STG 2  QuickDASH score to be </= 70.  -SS     STG 2 Progress  " Not Met;Ongoing  -SS     STG 3  Increase cervical flexion to >/= 25 deg.  -SS     STG 3 Progress  Met  -SS     STG 4  Increase R shoulder active flexion to >/= 75 deg.  -SS     STG 4 Progress  Met  -SS     STG 5  Increase R shoulder active extension to >/= 40 deg.  -SS     STG 5 Progress  Not Met;Ongoing  -        Long Term Goals    LTG Date to Achieve  12/14/62  -     LTG 1  Independent with HEP.  -SS     LTG 1 Progress  Not Met;Ongoing  -SS     LTG 2  QuickDASH score to be </= 50.  -SS     LTG 2 Progress  Not Met;Ongoing  -SS     LTG 3  R shoulder active elevation to 90 deg.  -     LTG 3 Progress  Not Met;Ongoing  -        Time Calculation    PT Goal Re-Cert Due Date  01/04/21  -       User Key  (r) = Recorded By, (t) = Taken By, (c) = Cosigned By    Initials Name Provider Type    SS Alfonzo Maxwell, PT DPT Physical Therapist        Time Calculation:   Start Time: 1105  Stop Time: 1153  Time Calculation (min): 48 min  Total Timed Code Minutes- PT: 30 minute(s)  Therapy Charges for Today     Code Description Service Date Service Provider Modifiers Qty    78785210106 HC PT THER PROC EA 15 MIN 12/29/2020 Alfonzo Maxwell, PT DPT GP 2    39171313337 HC PT THER SUPP EA 15 MIN 12/29/2020 Alfonzo Maxwell, PT DPT GP 1                    Alfonzo Maxwell, PT, DPT, CHT  12/29/2020

## 2020-12-31 ENCOUNTER — HOSPITAL ENCOUNTER (OUTPATIENT)
Dept: PHYSICAL THERAPY | Facility: HOSPITAL | Age: 45
Setting detail: THERAPIES SERIES
Discharge: HOME | End: 2020-12-31

## 2020-12-31 DIAGNOSIS — M25.511 RIGHT SHOULDER PAIN, UNSPECIFIED CHRONICITY: ICD-10-CM

## 2020-12-31 DIAGNOSIS — M54.9 MID BACK PAIN: ICD-10-CM

## 2020-12-31 DIAGNOSIS — G89.29 CHRONIC RIGHT SHOULDER PAIN: Primary | ICD-10-CM

## 2020-12-31 DIAGNOSIS — M25.511 CHRONIC RIGHT SHOULDER PAIN: Primary | ICD-10-CM

## 2020-12-31 PROCEDURE — 97110 THERAPEUTIC EXERCISES: CPT | Performed by: PHYSICAL THERAPIST

## 2021-01-05 ENCOUNTER — HOSPITAL ENCOUNTER (OUTPATIENT)
Dept: PHYSICAL THERAPY | Facility: HOSPITAL | Age: 46
Setting detail: THERAPIES SERIES
Discharge: HOME | End: 2021-01-05

## 2021-01-05 DIAGNOSIS — M54.9 MID BACK PAIN: ICD-10-CM

## 2021-01-05 DIAGNOSIS — M25.511 CHRONIC RIGHT SHOULDER PAIN: Primary | ICD-10-CM

## 2021-01-05 DIAGNOSIS — M25.511 RIGHT SHOULDER PAIN, UNSPECIFIED CHRONICITY: ICD-10-CM

## 2021-01-05 DIAGNOSIS — G89.29 CHRONIC RIGHT SHOULDER PAIN: Primary | ICD-10-CM

## 2021-01-05 PROCEDURE — 97140 MANUAL THERAPY 1/> REGIONS: CPT | Performed by: PHYSICAL THERAPIST

## 2021-01-05 NOTE — THERAPY PROGRESS REPORT/RE-CERT
Outpatient Physical Therapy Ortho Progress Note  [facility]     Patient Name: Christal Holder  : 1975  MRN: [id number]  Today's Date: 2021      Visit Date: 2021  Attendance: 15/16 (45/yr on primary, 14 used in )  Subjective Improvement: 35%  Next MD Appt:   Recert Date: 21     Therapy Diagnosis: chronic R shoulder pain and immobility s/p UT accident     Changes in Medications: none  Changes in MD Orders: none noted  Number of Work Days Lost: n/a       Past Medical History:   Diagnosis Date   • Anxiety    • Atelectasis    • Chronic pain disorder    • Compression fracture of body of thoracic vertebra (CMS/HCC)     T6-T12   • COPD (chronic obstructive pulmonary disease) (CMS/HCC)     due to trauma   • Depression    • Diabetes mellitus (CMS/HCC)    • Glenoid fracture of shoulder    • Pneumothorax     due to rib fractures   • Rib fracture    • Scapular fracture    • Severe episode of recurrent major depressive disorder, without psychotic features (CMS/HCC)         Past Surgical History:   Procedure Laterality Date   • CHOLECYSTECTOMY     • GLENOID OPEN REDUCTION INTERNAL FIXATION Right    • SCAPULA OPEN REDUCTION INTERNAL FIXATION     • TUBAL ABDOMINAL LIGATION         Visit Dx:     ICD-10-CM ICD-9-CM   1. Chronic right shoulder pain  M25.511 719.41    G89.29 338.29   2. Mid back pain  M54.9 724.5   3. Right shoulder pain, unspecified chronicity  M25.511 719.41             PT Ortho     Row Name 21 1000       Subjective Comments    Subjective Comments  Soreness in biceps lasted for about 24-36 hours. Can tell that the biceps muscle is tight when she touches it. Elbow is some. The cervicothoracic junction continues to have elevated pain. Thoracic spine is bothering her as well. She has had a couple of episodes of tingling in the thoracic spine with 1 instance of pain with it. Stretching her back into extension has seemed to help her thoracic spine pain. Put a  copper elbow sleeve on Friday and purchased a compression sleeve for her shoulder/UT region that ended up being too tight. The swelling in her upper trap/cervicothoracic region is extending into the right side of her face. She has purchased a massager for her neck to try as well. 35% subjective improvement. No medication changes.   -       Subjective Pain    Able to rate subjective pain?  yes  -    Pre-Treatment Pain Level  -- 6-7/10  -       Cervical/Thoracic Special Tests    Cervical/Thoracic Special Tests Comments  R scapula protracted. R cervicothoracic junction and R lateral cervical spine appears swollen.   -SS       Head/Neck/Trunk    Neck Extension AROM  30 deg  -SS    Neck Flexion AROM  20 deg  -    Neck Lt Lateral Flexion AROM  15 deg  -SS    Neck Rt Lateral Flexion AROM  20 deg  -SS    Neck Lt Rotation AROM  40 deg  -SS    Neck Rt Rotation AROM  48 deg  -       Right Upper Ext    Rt Shoulder Abduction AROM  85 deg, seated; pull about the shoulder and axilla  -    Rt Shoulder Extension AROM  25 deg; pull anterior shoulder and biceps  -    Rt Shoulder Flexion AROM  89 deg, seated  -      User Key  (r) = Recorded By, (t) = Taken By, (c) = Cosigned By    Initials Name Provider Type    SS Alfonzo Maxwell, PT DPT Physical Therapist          Therapy Education  Education Details: edema mobilization  Given: Edema management  How Provided: Verbal, Demonstration  Provided to: Patient  Level of Understanding: Verbalized     PT OP Goals     Row Name 01/05/21 1000          PT Short Term Goals    STG Date to Achieve  01/26/21  -     STG 1  Note a >/= 15% subjective improvement.  -     STG 1 Progress  Met  -     STG 2  QuickDASH score to be </= 70.  -     STG 2 Progress  Not Met;Ongoing  -     STG 3  Increase cervical flexion to >/= 25 deg.  -     STG 3 Progress  Met  -     STG 4  Increase R shoulder active flexion to >/= 75 deg.  -     STG 4 Progress  Met  -     STG 5  Increase R  shoulder active extension to >/= 40 deg.  -     STG 5 Progress  Not Met;Ongoing  -        Long Term Goals    LTG Date to Achieve  02/16/21  -     LTG 1  Independent with HEP.  -SS     LTG 1 Progress  Not Met;Ongoing  -SS     LTG 2  QuickDASH score to be </= 50.  -SS     LTG 2 Progress  Not Met;Ongoing  -SS     LTG 3  R shoulder active elevation to 90 deg.  -     LTG 3 Progress  Not Met;Ongoing  -SS        Time Calculation    PT Goal Re-Cert Due Date  01/26/21  -       User Key  (r) = Recorded By, (t) = Taken By, (c) = Cosigned By    Initials Name Provider Type    Alfonzo Pat, PT DPT Physical Therapist          PT Assessment/Plan     Row Name 01/05/21 1000          PT Assessment    Functional Limitations  Limitation in home management;Limitations in community activities;Limitations in functional capacity and performance;Performance in leisure activities;Performance in self-care ADL;Performance in work activities  -     Impairments  Pain;Range of motion;Motor function;Muscle strength  -     Assessment Comments  Decreased edema R cervicothoracic junction and lateral cervical spine after edema massage. Decreased overall pain post-treatment.  -     Rehab Potential  Fair barrier: chronicity, suprascapular nerve injury  -     Patient/caregiver participated in establishment of treatment plan and goals  Yes  -     Patient would benefit from skilled therapy intervention  Yes  -        PT Plan    PT Frequency  2x/week  -     Predicted Duration of Therapy Intervention (PT)  5-6 weeks with further to be determined  -     PT Plan Comments  Shoulder ROM, progressive shoulder strengthening, edema mobilization, MFR. MHP  -       User Key  (r) = Recorded By, (t) = Taken By, (c) = Cosigned By    Initials Name Provider Type    Alfonzo Pat, PT DPT Physical Therapist          Modalities     Row Name 01/05/21 1000             Moist Heat    MH Applied  Yes  -      Location  R  shoulder and trunk  -SS      Rx Minutes  15 mins  -      MH Prior to Rx  No  -SS      MH S/P Rx  Yes  -SS        User Key  (r) = Recorded By, (t) = Taken By, (c) = Cosigned By    Initials Name Provider Type    Alfonzo Pat, PT DPT Physical Therapist        OP Exercises     Row Name 01/05/21 1000             Subjective Comments    Subjective Comments  Soreness in biceps lasted for about 24-36 hours. Can tell that the biceps muscle is tight when she touches it. Elbow is some. The cervicothoracic junction continues to have elevated pain. Thoracic spine is bothering her as well. She has had a couple of episodes of tingling in the thoracic spine with 1 instance of pain with it. Stretching her back into extension has seemed to help her thoracic spine pain. Put a copper elbow sleeve on Friday and purchased a compression sleeve for her shoulder/UT region that ended up being too tight. The swelling in her upper trap/cervicothoracic region is extending into the right side of her face. She has purchased a massager for her neck to try as well. 35% subjective improvement. No medication changes.   -SS         Subjective Pain    Able to rate subjective pain?  yes  -SS      Pre-Treatment Pain Level  -- 6-7/10  -         Exercise 1    Exercise Name 1  recheck  -         Exercise 2    Exercise Name 2  Wand R biceps stretch  -      Cueing 2  Verbal;Demo  -      Sets 2  3  -SS      Time 2  30 sec hold  -         Exercise 3    Exercise Name 3  see Manual  -         Exercise 4    Exercise Name 4  see Modalities  -        User Key  (r) = Recorded By, (t) = Taken By, (c) = Cosigned By    Initials Name Provider Type    Alfonzo Pat, PT DPT Physical Therapist           Manual Rx (last 36 hours)      Manual Treatments     Row Name 01/05/21 0900             Manual Rx 1    Manual Rx 1 Location  R cervicothoracic junction and lateral cervical spine  -      Manual Rx 1 Type  edema mobilization  -       Manual Rx 1 Duration  10 mins  -SS         Manual Rx 2    Manual Rx 2 Location  R biceps muscle belly  -SS      Manual Rx 2 Type  MFR  -SS      Manual Rx 2 Duration  5 mins  -SS        User Key  (r) = Recorded By, (t) = Taken By, (c) = Cosigned By    Initials Name Provider Type    SS Alfonzo Maxwell, PT DPT Physical Therapist             Time Calculation:     Start Time: 1015  Stop Time: 1105  Time Calculation (min): 50 min  Total Timed Code Minutes- PT: 35 minute(s)     Therapy Charges for Today     Code Description Service Date Service Provider Modifiers Qty    02623949855 HC PT MANUAL THERAPY EA 15 MIN 1/5/2021 Alfonzo Maxwell, PT DPT GP 2    79670606449 HC PT THER SUPP EA 15 MIN 1/5/2021 Alfonzo Maxwell, PT DPT GP 1                    Alfonzo Maxwell, PT, DPT, CHT  1/5/2021

## 2021-01-08 ENCOUNTER — HOSPITAL ENCOUNTER (OUTPATIENT)
Dept: PHYSICAL THERAPY | Facility: HOSPITAL | Age: 46
Setting detail: THERAPIES SERIES
Discharge: HOME | End: 2021-01-08

## 2021-01-08 DIAGNOSIS — M54.9 MID BACK PAIN: ICD-10-CM

## 2021-01-08 DIAGNOSIS — M25.511 CHRONIC RIGHT SHOULDER PAIN: Primary | ICD-10-CM

## 2021-01-08 DIAGNOSIS — G89.29 CHRONIC RIGHT SHOULDER PAIN: Primary | ICD-10-CM

## 2021-01-08 DIAGNOSIS — M25.511 RIGHT SHOULDER PAIN, UNSPECIFIED CHRONICITY: ICD-10-CM

## 2021-01-08 PROCEDURE — 97140 MANUAL THERAPY 1/> REGIONS: CPT | Performed by: PHYSICAL THERAPIST

## 2021-01-08 NOTE — THERAPY TREATMENT NOTE
Outpatient Physical Therapy Ortho Treatment Note  [facility]     Patient Name: Christal Holder  : 1975  MRN: [id number]  Today's Date: 2021      Visit Date: 2021  Attendance: 16 (45/yr on primary, 14 used in )  Subjective Improvement: 35%  Next MD Appt:   Recert Date: 21     Therapy Diagnosis: chronic R shoulder pain and immobility s/p UT accident     Visit Dx:    ICD-10-CM ICD-9-CM   1. Chronic right shoulder pain  M25.511 719.41    G89.29 338.29   2. Mid back pain  M54.9 724.5   3. Right shoulder pain, unspecified chronicity  M25.511 719.41            Past Medical History:   Diagnosis Date   • Anxiety    • Atelectasis    • Chronic pain disorder    • Compression fracture of body of thoracic vertebra (CMS/HCC)     T6-T12   • COPD (chronic obstructive pulmonary disease) (CMS/HCC)     due to trauma   • Depression    • Diabetes mellitus (CMS/HCC)    • Glenoid fracture of shoulder    • Pneumothorax     due to rib fractures   • Rib fracture    • Scapular fracture    • Severe episode of recurrent major depressive disorder, without psychotic features (CMS/HCC)         Past Surgical History:   Procedure Laterality Date   • CHOLECYSTECTOMY     • GLENOID OPEN REDUCTION INTERNAL FIXATION Right    • SCAPULA OPEN REDUCTION INTERNAL FIXATION     • TUBAL ABDOMINAL LIGATION         PT Ortho     Row Name 21 1000       Subjective Comments    Subjective Comments  Pain R cervicothoracic  junction, UT, and lateral brachium. Has been working on edema mobilization at home.  -       Subjective Pain    Able to rate subjective pain?  yes  -SS    Pre-Treatment Pain Level  -- 6-7/10  -SS    Post-Treatment Pain Level  4  -SS       Cervical/Thoracic Special Tests    Cervical/Thoracic Special Tests Comments  R scapula protracted. Increased tone R scalenes and UT.  -      User Key  (r) = Recorded By, (t) = Taken By, (c) = Cosigned By    Initials Name Provider Type      Alfonzo Maxwell, PT DPT Physical Therapist            PT Assessment/Plan     Row Name 01/08/21 1000          PT Assessment    Functional Limitations  Limitation in home management;Limitations in community activities;Limitations in functional capacity and performance;Performance in leisure activities;Performance in self-care ADL;Performance in work activities  -     Impairments  Pain;Range of motion;Motor function;Muscle strength  -     Assessment Comments  Decreased tone in R UT/levator scapula compared to initiation of treatment this date. Decreased overall pain today.   -     Rehab Potential  Fair barrier: chronicity, suprascapular nerve injury  -     Patient/caregiver participated in establishment of treatment plan and goals  Yes  -SS     Patient would benefit from skilled therapy intervention  Yes  -SS        PT Plan    PT Frequency  2x/week  -     Predicted Duration of Therapy Intervention (PT)  5-6 weeks with further to be determined  -     PT Plan Comments  Continue POC. Resume strengthening as tolerated.  -       User Key  (r) = Recorded By, (t) = Taken By, (c) = Cosigned By    Initials Name Provider Type     Alfonzo Maxwell, PT DPT Physical Therapist          Modalities     Row Name 01/08/21 1000             Moist Heat    MH Applied  Yes  -SS      Location  R shoulder and trunk  -      Rx Minutes  15 mins  -SS      MH Prior to Rx  No  -SS      MH S/P Rx  Yes  -SS        User Key  (r) = Recorded By, (t) = Taken By, (c) = Cosigned By    Initials Name Provider Type     Alfonzo Maxwell, PT DPT Physical Therapist        OP Exercises     Row Name 01/08/21 1000             Subjective Comments    Subjective Comments  Pain R cervicothoracic  junction, UT, and lateral brachium. Has been working on edema mobilization at home.  -         Subjective Pain    Able to rate subjective pain?  yes  -SS      Pre-Treatment Pain Level  -- 6-7/10  -SS      Post-Treatment Pain Level  4   -SS         Exercise 1    Exercise Name 1  see Manual  -SS         Exercise 2    Exercise Name 2  manual R levator scapula stretch  -SS      Cueing 2  Verbal;Tactile  -SS      Sets 2  3  -SS      Time 2  30 sec hold  -SS         Exercise 3    Exercise Name 3  manual R UT stretch  -SS      Cueing 3  Verbal;Tactile  -SS      Sets 3  3  -SS      Time 3  30 sec hold  -SS         Exercise 4    Exercise Name 4  see Modalities  -SS        User Key  (r) = Recorded By, (t) = Taken By, (c) = Cosigned By    Initials Name Provider Type    Alfonzo Pat, PT DPT Physical Therapist            Manual Rx (last 36 hours)      Manual Treatments     Row Name 01/08/21 0900             Manual Rx 1    Manual Rx 1 Location  R UT and scalenes  -SS      Manual Rx 1 Type  MFR  -SS         Manual Rx 2    Manual Rx 2 Location  R lateral brachium  -SS      Manual Rx 2 Type  MFR  -SS         Manual Rx 3    Manual Rx 3 Location  cervical spine  -SS      Manual Rx 3 Type  manual distraction  -SS      Manual Rx 3 Grade  1/2  -SS        User Key  (r) = Recorded By, (t) = Taken By, (c) = Cosigned By    Initials Name Provider Type    Alfonzo Pat, PT DPT Physical Therapist          PT OP Goals     Row Name 01/08/21 1000          PT Short Term Goals    STG Date to Achieve  01/26/21  -SS     STG 1  Note a >/= 15% subjective improvement.  -SS     STG 1 Progress  Met  -SS     STG 2  QuickDASH score to be </= 70.  -SS     STG 2 Progress  Not Met;Ongoing  -SS     STG 3  Increase cervical flexion to >/= 25 deg.  -SS     STG 3 Progress  Met  -SS     STG 4  Increase R shoulder active flexion to >/= 75 deg.  -SS     STG 4 Progress  Met  -SS     STG 5  Increase R shoulder active extension to >/= 40 deg.  -SS     STG 5 Progress  Not Met;Ongoing  -SS        Long Term Goals    LTG Date to Achieve  02/16/21  -SS     LTG 1  Independent with HEP.  -SS     LTG 1 Progress  Not Met;Ongoing  -SS     LTG 2  QuickDASH score to be </= 50.  -SS     LTG  2 Progress  Not Met;Ongoing  -     LTG 3  R shoulder active elevation to 90 deg.  -     LTG 3 Progress  Not Met;Ongoing  -        Time Calculation    PT Goal Re-Cert Due Date  01/26/21  -       User Key  (r) = Recorded By, (t) = Taken By, (c) = Cosigned By    Initials Name Provider Type     Alfonzo Maxwell, PT DPT Physical Therapist                         Time Calculation:   Start Time: 1020  Stop Time: 1113  Time Calculation (min): 53 min  Total Timed Code Minutes- PT: 35 minute(s)  Therapy Charges for Today     Code Description Service Date Service Provider Modifiers Qty    90950335588  PT MANUAL THERAPY EA 15 MIN 1/8/2021 Alfonzo Maxwell, PT DPT GP 2    16814453869 HC PT THER SUPP EA 15 MIN 1/8/2021 Alfonzo Maxwell, PT DPT GP 1                    Alfonzo Maxwell, PT, DPT, CHT  1/8/2021

## 2021-01-12 ENCOUNTER — HOSPITAL ENCOUNTER (OUTPATIENT)
Dept: PHYSICAL THERAPY | Facility: HOSPITAL | Age: 46
Setting detail: THERAPIES SERIES
Discharge: HOME | End: 2021-01-12

## 2021-01-12 DIAGNOSIS — M54.9 MID BACK PAIN: ICD-10-CM

## 2021-01-12 DIAGNOSIS — M25.511 CHRONIC RIGHT SHOULDER PAIN: Primary | ICD-10-CM

## 2021-01-12 DIAGNOSIS — M25.511 RIGHT SHOULDER PAIN, UNSPECIFIED CHRONICITY: ICD-10-CM

## 2021-01-12 DIAGNOSIS — G89.29 CHRONIC RIGHT SHOULDER PAIN: Primary | ICD-10-CM

## 2021-01-12 PROCEDURE — 97110 THERAPEUTIC EXERCISES: CPT | Performed by: PHYSICAL THERAPIST

## 2021-01-12 NOTE — THERAPY TREATMENT NOTE
Outpatient Physical Therapy Ortho Treatment Note  [facility]     Patient Name: Christal Holder  : 1975  MRN: [id number]  Today's Date: 2021      Visit Date: 2021  Attendance: 1718 (45/yr on primary, 14 used in )  Subjective Improvement: 35%  Next MD Appt:   Recert Date: 21     Therapy Diagnosis: chronic R shoulder pain and immobility s/p UT accident     Visit Dx:    ICD-10-CM ICD-9-CM   1. Chronic right shoulder pain  M25.511 719.41    G89.29 338.29   2. Mid back pain  M54.9 724.5   3. Right shoulder pain, unspecified chronicity  M25.511 719.41            Past Medical History:   Diagnosis Date   • Anxiety    • Atelectasis    • Chronic pain disorder    • Compression fracture of body of thoracic vertebra (CMS/HCC)     T6-T12   • COPD (chronic obstructive pulmonary disease) (CMS/HCC)     due to trauma   • Depression    • Diabetes mellitus (CMS/HCC)    • Glenoid fracture of shoulder    • Pneumothorax     due to rib fractures   • Rib fracture    • Scapular fracture    • Severe episode of recurrent major depressive disorder, without psychotic features (CMS/HCC)         Past Surgical History:   Procedure Laterality Date   • CHOLECYSTECTOMY     • GLENOID OPEN REDUCTION INTERNAL FIXATION Right    • SCAPULA OPEN REDUCTION INTERNAL FIXATION     • TUBAL ABDOMINAL LIGATION         PT Ortho     Row Name 21 1000       Subjective Comments    Subjective Comments  Pain is abating some. Has been wearing a brace for her shoulder for the past few days. One seems to be helping how she feels.  She has not tried wearing the other one yet.   -SS       Subjective Pain    Able to rate subjective pain?  yes  -SS    Pre-Treatment Pain Level  5  -SS    Post-Treatment Pain Level  4  -SS       Cervical/Thoracic Special Tests    Cervical/Thoracic Special Tests Comments  R scapula protracted. Increased tone R scalenes and UT. Minimal edema noted at CT junction.  -SS       Right Upper  Ext    Rt Shoulder Abduction AROM  90 deg seated after therex  -SS    Rt Shoulder Flexion AROM  85 deg with table inclined 45 deg; 100 deg seated after therex  -SS      User Key  (r) = Recorded By, (t) = Taken By, (c) = Cosigned By    Initials Name Provider Type    Alfonzo Pat, PT DPT Physical Therapist          PT Assessment/Plan     Row Name 01/12/21 1000          PT Assessment    Functional Limitations  Limitation in home management;Limitations in community activities;Limitations in functional capacity and performance;Performance in leisure activities;Performance in self-care ADL;Performance in work activities  -     Impairments  Pain;Range of motion;Motor function;Muscle strength  -     Assessment Comments  Good effort. Tolerated treatment well. Improved shoulder flexion and abduction at end of treatment.   -     Rehab Potential  Fair barrier: chronicity, suprascapular nerve injury  -     Patient/caregiver participated in establishment of treatment plan and goals  Yes  -     Patient would benefit from skilled therapy intervention  Yes  -SS        PT Plan    PT Frequency  2x/week  -     Predicted Duration of Therapy Intervention (PT)  5-6 weeks with further to be determined  -     PT Plan Comments  Progressive strengthening as tolerated.  -       User Key  (r) = Recorded By, (t) = Taken By, (c) = Cosigned By    Initials Name Provider Type    Alfonzo Pat, PT DPT Physical Therapist          Modalities     Row Name 01/12/21 1000             Moist Heat    MH Applied  Yes  -      Location  R shoulder and trunk  -      Rx Minutes  15 mins  -SS      MH Prior to Rx  No  -SS      MH S/P Rx  Yes  -        User Key  (r) = Recorded By, (t) = Taken By, (c) = Cosigned By    Initials Name Provider Type    Alfonzo Pat, PT DPT Physical Therapist        OP Exercises     Row Name 01/12/21 1000             Subjective Comments    Subjective Comments  Pain is abating  some. Has been wearing a brace for her shoulder for the past few days. One seems to be helping how she feels.  She has not tried wearing the other one yet.   -SS         Subjective Pain    Able to rate subjective pain?  yes  -SS      Pre-Treatment Pain Level  5  -SS      Post-Treatment Pain Level  4  -SS         Exercise 1    Exercise Name 1  Active shoulder flexion with table inclined 45 deg  -SS      Cueing 1  Verbal  -SS      Sets 1  2  -SS      Reps 1  10  -SS         Exercise 2    Exercise Name 2  Active shoulder abduction with table inclined 45 deg  -SS      Cueing 2  Verbal  -SS      Sets 2  2  -SS      Reps 2  10  -SS         Exercise 3    Exercise Name 3  Active shoulder ER/IR in scaption with table inclined 45 deg  -SS      Cueing 3  Verbal  -SS      Sets 3  2  -SS      Reps 3  10  -SS         Exercise 4    Exercise Name 4  Isometric shoulder extension with table inclined 45 deg  -SS      Cueing 4  Verbal;Tactile  -SS      Sets 4  1  -SS      Reps 4  30  -SS      Time 4  2 sec hold  -SS      Additional Comments  P.T. resist  -SS         Exercise 5    Exercise Name 5  Isometric shoulder adduction with table inclined 45 deg  -SS      Cueing 5  Verbal;Tactile  -SS      Sets 5  1  -SS      Reps 5  30  -SS      Time 5  2 sec hold  -SS      Additional Comments  P.T. resist  -SS         Exercise 6    Exercise Name 6  Scapular protraction/retraction with table inclined 45 deg  -SS      Cueing 6  Verbal;Tactile  -SS      Sets 6  2  -SS      Reps 6  10  -SS      Additional Comments  P.T. assist holding arm elevated  -SS         Exercise 7    Exercise Name 7  R UE PNF D2 in lower half of motion with table inclined 45 deg  -SS      Cueing 7  Verbal;Tactile  -SS      Sets 7  1  -SS      Reps 7  10  -SS         Exercise 8    Exercise Name 8  R UE PNF D1  -SS      Cueing 8  Verbal  -SS      Sets 8  2  -SS      Reps 8  10  -SS         Exercise 9    Exercise Name 9  DB biceps curls  -SS      Cueing 9  Verbal  -SS      Sets  9  3  -      Reps 9  10  -      Additional Comments  2#  -        User Key  (r) = Recorded By, (t) = Taken By, (c) = Cosigned By    Initials Name Provider Type    SS Alfonzo Maxwell, PT DPT Physical Therapist            PT OP Goals     Row Name 01/12/21 1000          PT Short Term Goals    STG Date to Achieve  01/26/21  -     STG 1  Note a >/= 15% subjective improvement.  -SS     STG 1 Progress  Met  -     STG 2  QuickDASH score to be </= 70.  -SS     STG 2 Progress  Not Met;Ongoing  -SS     STG 3  Increase cervical flexion to >/= 25 deg.  -SS     STG 3 Progress  Met  -SS     STG 4  Increase R shoulder active flexion to >/= 75 deg.  -SS     STG 4 Progress  Met  -SS     STG 5  Increase R shoulder active extension to >/= 40 deg.  -     STG 5 Progress  Not Met;Ongoing  -        Long Term Goals    LTG Date to Achieve  02/16/21  -     LTG 1  Independent with HEP.  -     LTG 1 Progress  Not Met;Ongoing  -     LTG 2  QuickDASH score to be </= 50.  -     LTG 2 Progress  Not Met;Ongoing  -     LTG 3  R shoulder active elevation to 90 deg.  -     LTG 3 Progress  Not Met;Ongoing  -        Time Calculation    PT Goal Re-Cert Due Date  01/26/21  -       User Key  (r) = Recorded By, (t) = Taken By, (c) = Cosigned By    Initials Name Provider Type    SS Alfonzo Maxwell, PT DPT Physical Therapist                         Time Calculation:   Start Time: 1028  Stop Time: 1114  Time Calculation (min): 46 min  Total Timed Code Minutes- PT: 30 minute(s)  Therapy Charges for Today     Code Description Service Date Service Provider Modifiers Qty    42770375856 HC PT THER PROC EA 15 MIN 1/12/2021 Alfonzo Maxwell, PT DPT GP 2    74770183048 HC PT THER SUPP EA 15 MIN 1/12/2021 Alfonzo Maxwell, PT DPT GP 1                    Alfonzo Maxwell, PT, DPT, CHT  1/12/2021

## 2021-01-15 ENCOUNTER — HOSPITAL ENCOUNTER (OUTPATIENT)
Dept: PHYSICAL THERAPY | Facility: HOSPITAL | Age: 46
Setting detail: THERAPIES SERIES
Discharge: HOME | End: 2021-01-15

## 2021-01-15 DIAGNOSIS — M54.9 MID BACK PAIN: ICD-10-CM

## 2021-01-15 DIAGNOSIS — M25.511 RIGHT SHOULDER PAIN, UNSPECIFIED CHRONICITY: ICD-10-CM

## 2021-01-15 DIAGNOSIS — M25.511 CHRONIC RIGHT SHOULDER PAIN: Primary | ICD-10-CM

## 2021-01-15 DIAGNOSIS — G89.29 CHRONIC RIGHT SHOULDER PAIN: Primary | ICD-10-CM

## 2021-01-15 PROCEDURE — 97110 THERAPEUTIC EXERCISES: CPT | Performed by: PHYSICAL THERAPIST

## 2021-01-15 NOTE — THERAPY TREATMENT NOTE
Outpatient Physical Therapy Ortho Treatment Note  [facility]     Patient Name: Christal Holder  : 1975  MRN: [id number]  Today's Date: 1/15/2021      Visit Date: 01/15/2021  Attendance:  (45/yr on primary, 14 used in )  Subjective Improvement: 40%  Next MD Appt:   Recert Date: 21     Therapy Diagnosis: chronic R shoulder pain and immobility s/p UT accident     Visit Dx:    ICD-10-CM ICD-9-CM   1. Chronic right shoulder pain  M25.511 719.41    G89.29 338.29   2. Mid back pain  M54.9 724.5   3. Right shoulder pain, unspecified chronicity  M25.511 719.41            Past Medical History:   Diagnosis Date   • Anxiety    • Atelectasis    • Chronic pain disorder    • Compression fracture of body of thoracic vertebra (CMS/HCC)     T6-T12   • COPD (chronic obstructive pulmonary disease) (CMS/HCC)     due to trauma   • Depression    • Diabetes mellitus (CMS/HCC)    • Glenoid fracture of shoulder    • Pneumothorax     due to rib fractures   • Rib fracture    • Scapular fracture    • Severe episode of recurrent major depressive disorder, without psychotic features (CMS/HCC)         Past Surgical History:   Procedure Laterality Date   • CHOLECYSTECTOMY     • GLENOID OPEN REDUCTION INTERNAL FIXATION Right    • SCAPULA OPEN REDUCTION INTERNAL FIXATION     • TUBAL ABDOMINAL LIGATION         PT Ortho     Row Name 01/15/21 0900       Subjective Comments    Subjective Comments  Patient has brought in her neoprene shoulder supports to look at. R UT region feels more swollen and knotted up this date. 40% subjective improvement.  -SS       Subjective Pain    Able to rate subjective pain?  yes  -SS    Pre-Treatment Pain Level  5  -SS    Post-Treatment Pain Level  4  -SS       Cervical/Thoracic Special Tests    Cervical/Thoracic Special Tests Comments  R scapula protracted. Increased tone R scalenes and UT. Edema noted at CT junction.  -SS       Right Upper Ext    Rt Shoulder Flexion AROM   113 deg, table inclined 45 deg  -      User Key  (r) = Recorded By, (t) = Taken By, (c) = Cosigned By    Initials Name Provider Type    Alfonzo Pat, PT DPT Physical Therapist            PT Assessment/Plan     Row Name 01/15/21 0900          PT Assessment    Functional Limitations  Limitation in home management;Limitations in community activities;Limitations in functional capacity and performance;Performance in leisure activities;Performance in self-care ADL;Performance in work activities  -     Impairments  Pain;Range of motion;Motor function;Muscle strength  -     Assessment Comments  Improved active flexion this date. Sore in R CT junction with therex.  -SS     Rehab Potential  Fair barrier: chronicity, suprascapular nerve injury  -     Patient/caregiver participated in establishment of treatment plan and goals  Yes  -SS     Patient would benefit from skilled therapy intervention  Yes  -SS        PT Plan    PT Frequency  2x/week  -     Predicted Duration of Therapy Intervention (PT)  5 sec hold  -SS     PT Plan Comments  Recheck next week. Continue POC.  -SS       User Key  (r) = Recorded By, (t) = Taken By, (c) = Cosigned By    Initials Name Provider Type    Alfonzo Pat, PT DPT Physical Therapist          Modalities     Row Name 01/15/21 0900             Moist Heat    MH Applied  Yes  -SS      Location  R shoulder and trunk  -SS      Rx Minutes  15 mins  -SS      MH Prior to Rx  No  -SS      MH S/P Rx  Yes  -SS        User Key  (r) = Recorded By, (t) = Taken By, (c) = Cosigned By    Initials Name Provider Type    Alfonzo Pat, PT DPT Physical Therapist        OP Exercises     Row Name 01/15/21 0900             Subjective Comments    Subjective Comments  Patient has brought in her neoprene shoulder supports to look at. R UT region feels more swollen and knotted up this date. 40% subjective improvement.  -SS         Subjective Pain    Able to rate subjective pain?   yes  -SS      Pre-Treatment Pain Level  5  -SS      Post-Treatment Pain Level  4  -SS         Exercise 1    Exercise Name 1  Don neoprene shoulder supports and discuss fit  -SS         Exercise 2    Exercise Name 2  Active shoulder flexion with table inclined 45 deg  -SS      Cueing 2  Verbal  -SS      Sets 2  3  -SS      Reps 2  10  -SS         Exercise 3    Exercise Name 3  Active shoulder scaption with table inclined 45 deg  -SS      Cueing 3  Verbal  -SS      Sets 3  3  -SS      Reps 3  10  -SS         Exercise 4    Exercise Name 4  Isometric shoulder IR  -SS      Cueing 4  Verbal;Tactile  -SS      Sets 4  2  -SS      Reps 4  10  -SS      Time 4  5 sec hold  -SS         Exercise 5    Exercise Name 5  R UE PNF D2 in lower half of motion with table inclined 45 deg  -SS      Cueing 5  Verbal;Tactile  -SS      Sets 5  3  -SS      Reps 5  10  -SS         Exercise 6    Exercise Name 6  Isometric shoulder depression  -SS      Cueing 6  Verbal;Tactile  -SS      Sets 6  3  -SS      Reps 6  10  -SS      Time 6  5 sec hold  -SS        User Key  (r) = Recorded By, (t) = Taken By, (c) = Cosigned By    Initials Name Provider Type    SS Alfonzo Maxwell, PT DPT Physical Therapist            PT OP Goals     Row Name 01/15/21 0900          PT Short Term Goals    STG Date to Achieve  01/26/21  -     STG 1  Note a >/= 15% subjective improvement.  -SS     STG 1 Progress  Met  -     STG 2  QuickDASH score to be </= 70.  -     STG 2 Progress  Not Met;Ongoing  -     STG 3  Increase cervical flexion to >/= 25 deg.  -     STG 3 Progress  Met  -     STG 4  Increase R shoulder active flexion to >/= 75 deg.  -     STG 4 Progress  Met  -     STG 5  Increase R shoulder active extension to >/= 40 deg.  -     STG 5 Progress  Not Met;Ongoing  -        Long Term Goals    LTG Date to Achieve  02/16/21  -     LTG 1  Independent with HEP.  -     LTG 1 Progress  Not Met;Ongoing  -     LTG 2  QuickDASH score to be </=  50.  -     LTG 2 Progress  Not Met;Ongoing  -     LTG 3  R shoulder active elevation to 90 deg.  -     LTG 3 Progress  Not Met;Ongoing  -        Time Calculation    PT Goal Re-Cert Due Date  01/26/21  -       User Key  (r) = Recorded By, (t) = Taken By, (c) = Cosigned By    Initials Name Provider Type    SS Alfonzo Maxwell, PT DPT Physical Therapist          Time Calculation:   Start Time: 0940  Stop Time: 1036  Time Calculation (min): 56 min  Total Timed Code Minutes- PT: 40 minute(s)  Therapy Charges for Today     Code Description Service Date Service Provider Modifiers Qty    90534714994 HC PT THER PROC EA 15 MIN 1/15/2021 Alfonzo Maxwell, PT DPT GP 3    63586175764 HC PT THER SUPP EA 15 MIN 1/15/2021 Alfonzo Maxwell, PT DPT GP 1                    Alfonzo Maxwell, PT, DPT, CHT  1/15/2021

## 2021-01-19 ENCOUNTER — HOSPITAL ENCOUNTER (OUTPATIENT)
Dept: PHYSICAL THERAPY | Facility: HOSPITAL | Age: 46
Setting detail: THERAPIES SERIES
Discharge: HOME | End: 2021-01-19

## 2021-01-19 DIAGNOSIS — M25.511 RIGHT SHOULDER PAIN, UNSPECIFIED CHRONICITY: ICD-10-CM

## 2021-01-19 DIAGNOSIS — M25.511 CHRONIC RIGHT SHOULDER PAIN: Primary | ICD-10-CM

## 2021-01-19 DIAGNOSIS — M54.9 MID BACK PAIN: ICD-10-CM

## 2021-01-19 DIAGNOSIS — G89.29 CHRONIC RIGHT SHOULDER PAIN: Primary | ICD-10-CM

## 2021-01-19 PROCEDURE — 97140 MANUAL THERAPY 1/> REGIONS: CPT | Performed by: PHYSICAL THERAPIST

## 2021-01-19 PROCEDURE — 97110 THERAPEUTIC EXERCISES: CPT | Performed by: PHYSICAL THERAPIST

## 2021-01-19 NOTE — THERAPY TREATMENT NOTE
"    Outpatient Physical Therapy Ortho Treatment Note  [facility]     Patient Name: Christal Holder  : 1975  MRN: [id number]  Today's Date: 2021      Visit Date: 2021  Attendance:  (45/yr on primary, 14 used in )  Subjective Improvement: 40%  Next MD Appt: 21  Recert Date: 21     Therapy Diagnosis: chronic R shoulder pain and immobility s/p UT accident     Visit Dx:    ICD-10-CM ICD-9-CM   1. Chronic right shoulder pain  M25.511 719.41    G89.29 338.29   2. Mid back pain  M54.9 724.5   3. Right shoulder pain, unspecified chronicity  M25.511 719.41            Past Medical History:   Diagnosis Date   • Anxiety    • Atelectasis    • Chronic pain disorder    • Compression fracture of body of thoracic vertebra (CMS/HCC)     T6-T12   • COPD (chronic obstructive pulmonary disease) (CMS/HCC)     due to trauma   • Depression    • Diabetes mellitus (CMS/HCC)    • Glenoid fracture of shoulder    • Pneumothorax     due to rib fractures   • Rib fracture    • Scapular fracture    • Severe episode of recurrent major depressive disorder, without psychotic features (CMS/HCC)         Past Surgical History:   Procedure Laterality Date   • CHOLECYSTECTOMY     • GLENOID OPEN REDUCTION INTERNAL FIXATION Right    • SCAPULA OPEN REDUCTION INTERNAL FIXATION     • TUBAL ABDOMINAL LIGATION         PT Ortho     Row Name 21 1300       Subjective Comments    Subjective Comments  \"I'm okay, but I'm hurting.\" Pain R lateral cervical spine, R UT region, and R scapular region. Unsure why pain increased. \"Catching\" in brachium and shoulder occasionally over the weekend.  -SS       Subjective Pain    Able to rate subjective pain?  yes  -SS    Pre-Treatment Pain Level  -- 6-7/10  -SS    Post-Treatment Pain Level  5  -SS       Cervical/Thoracic Special Tests    Cervical/Thoracic Special Tests Comments  Increased tone R proximal biceps, anterior shoulder, UT, levator scapula, and periscapular " "musculature.  -SS       Head/Neck/Trunk    Neck Extension AROM  35 deg  -SS    Neck Flexion AROM  40 deg; \"feels like a strain\"  -SS    Neck Lt Lateral Flexion AROM  15 deg; pain  -SS    Neck Rt Lateral Flexion AROM  25 deg  -SS    Neck Lt Rotation AROM  54 deg  -SS    Neck Rt Rotation AROM  40 deg; strain/discomfort  -SS       Right Upper Ext    Rt Shoulder Abduction AROM  80 deg;; \"just a little\" pain  -SS    Rt Shoulder Extension AROM  25 deg; \"just a little\" pain  -SS    Rt Shoulder Flexion AROM  105 deg; \"just a little\" pain  -SS      User Key  (r) = Recorded By, (t) = Taken By, (c) = Cosigned By    Initials Name Provider Type    Alfonzo Pat, PT DPT Physical Therapist            PT Assessment/Plan     Row Name 01/19/21 1300          PT Assessment    Functional Limitations  Limitation in home management;Limitations in community activities;Limitations in functional capacity and performance;Performance in leisure activities;Performance in self-care ADL;Performance in work activities  -     Impairments  Pain;Range of motion;Motor function;Muscle strength  -     Assessment Comments  Pain and muscle tension improved with MFR and MHP. Unknown cause of pain increase.  -     Rehab Potential  Fair barrier: chronicity, suprascapular nerve injury  -     Patient/caregiver participated in establishment of treatment plan and goals  Yes  -     Patient would benefit from skilled therapy intervention  Yes  -SS        PT Plan    PT Frequency  2x/week  -     Predicted Duration of Therapy Intervention (PT)  5-6 weeks  -     PT Plan Comments  Recheck next visit. Progress as tolerated.  -       User Key  (r) = Recorded By, (t) = Taken By, (c) = Cosigned By    Initials Name Provider Type    Alfonzo Pat, PT DPT Physical Therapist          Modalities     Row Name 01/19/21 1300             Moist Heat    MH Applied  Yes  -SS      Location  R shoulder and trunk  -      Rx Minutes  15 mins  -SS  " "    MH Prior to Rx  No  -SS      MH S/P Rx  Yes  -SS        User Key  (r) = Recorded By, (t) = Taken By, (c) = Cosigned By    Initials Name Provider Type    Alfonzo Pat, PT DPT Physical Therapist        OP Exercises     Row Name 01/19/21 1300             Subjective Comments    Subjective Comments  \"I'm okay, but I'm hurting.\" Pain R lateral cervical spine, R UT region, and R scapular region. Unsure why pain increased. \"Catching\" in brachium and shoulder occasionally over the weekend.  -SS         Subjective Pain    Able to rate subjective pain?  yes  -SS      Pre-Treatment Pain Level  -- 6-7/10  -SS      Post-Treatment Pain Level  5  -SS         Exercise 1    Exercise Name 1  PROM R shoulder flexion/scaption with table elevated 45 deg  -SS      Cueing 1  Verbal;Tactile  -SS      Sets 1  1  -SS      Reps 1  30  -SS         Exercise 2    Exercise Name 2  AROM R shoulder flexion with table elevated 45 deg  -SS      Cueing 2  Verbal  -SS      Sets 2  1  -SS      Reps 2  15  -SS         Exercise 3    Exercise Name 3  PROM R shoulder abduction  -SS      Cueing 3  Verbal;Tactile  -SS      Sets 3  1  -SS      Reps 3  30  -SS         Exercise 4    Exercise Name 4  AROM R shoulder scaption  -SS      Cueing 4  Verbal  -SS      Sets 4  1  -SS      Reps 4  15  -SS         Exercise 5    Exercise Name 5  AAROM UE PNF D2 below 90 deg  -SS      Cueing 5  Verbal;Tactile  -SS      Sets 5  1  -SS      Reps 5  30  -SS         Exercise 6    Exercise Name 6  see Manual  -SS         Exercise 7    Exercise Name 7  check AROM  -SS        User Key  (r) = Recorded By, (t) = Taken By, (c) = Cosigned By    Initials Name Provider Type    Alfonzo Pat, PT DPT Physical Therapist             Manual Rx (last 36 hours)      Manual Treatments     Row Name 01/19/21 1300             Manual Rx 1    Manual Rx 1 Location  anterior shoulder and proximal biceps  -SS      Manual Rx 1 Type  MFR   -SS      Manual Rx 1 Duration  1 min  " -         Manual Rx 2    Manual Rx 2 Location  R periscapular, levator scapula, and UT  -      Manual Rx 2 Type  MFR  -      Manual Rx 2 Duration  10 mins  -        User Key  (r) = Recorded By, (t) = Taken By, (c) = Cosigned By    Initials Name Provider Type    Alfonzo Pat, PT DPT Physical Therapist          PT OP Goals     Row Name 01/19/21 1300          PT Short Term Goals    STG Date to Achieve  01/26/21  -     STG 1  Note a >/= 15% subjective improvement.  -SS     STG 1 Progress  Met  -SS     STG 2  QuickDASH score to be </= 70.  -SS     STG 2 Progress  Not Met;Ongoing  -     STG 3  Increase cervical flexion to >/= 25 deg.  -SS     STG 3 Progress  Met  -     STG 4  Increase R shoulder active flexion to >/= 75 deg.  -SS     STG 4 Progress  Met  -SS     STG 5  Increase R shoulder active extension to >/= 40 deg.  -     STG 5 Progress  Not Met;Ongoing  -        Long Term Goals    LTG Date to Achieve  02/16/21  -     LTG 1  Independent with HEP.  -SS     LTG 1 Progress  Not Met;Ongoing  -     LTG 2  QuickDASH score to be </= 50.  -     LTG 2 Progress  Not Met;Ongoing  -     LTG 3  R shoulder active elevation to 90 deg.  -     LTG 3 Progress  Not Met;Ongoing  -        Time Calculation    PT Goal Re-Cert Due Date  01/26/21  -       User Key  (r) = Recorded By, (t) = Taken By, (c) = Cosigned By    Initials Name Provider Type     Alfonzo Maxwell, PT DPT Physical Therapist                         Time Calculation:   Start Time: 1307  Stop Time: 1356  Time Calculation (min): 49 min  Total Timed Code Minutes- PT: 34 minute(s)  Therapy Charges for Today     Code Description Service Date Service Provider Modifiers Qty    25523969843 HC PT THER PROC EA 15 MIN 1/19/2021 Alfonzo Maxwell, PT DPT GP 1    62599609898 HC PT MANUAL THERAPY EA 15 MIN 1/19/2021 Alfonzo Maxwell, PT DPT GP 1    22081354651 HC PT THER SUPP EA 15 MIN 1/19/2021 Alfonzo Maxwell, PT  DPT GP 1                    Alfonzo Maxwell, PT, DPT, CHT  1/19/2021

## 2021-01-22 ENCOUNTER — HOSPITAL ENCOUNTER (OUTPATIENT)
Dept: PHYSICAL THERAPY | Facility: HOSPITAL | Age: 46
Setting detail: THERAPIES SERIES
Discharge: HOME | End: 2021-01-22

## 2021-01-22 DIAGNOSIS — G89.29 CHRONIC RIGHT SHOULDER PAIN: Primary | ICD-10-CM

## 2021-01-22 DIAGNOSIS — M54.9 MID BACK PAIN: ICD-10-CM

## 2021-01-22 DIAGNOSIS — G89.29 OTHER CHRONIC PAIN: ICD-10-CM

## 2021-01-22 DIAGNOSIS — M25.511 CHRONIC RIGHT SHOULDER PAIN: Primary | ICD-10-CM

## 2021-01-22 DIAGNOSIS — M25.511 RIGHT SHOULDER PAIN, UNSPECIFIED CHRONICITY: ICD-10-CM

## 2021-01-22 PROCEDURE — 97140 MANUAL THERAPY 1/> REGIONS: CPT | Performed by: PHYSICAL THERAPIST

## 2021-01-22 NOTE — THERAPY PROGRESS REPORT/RE-CERT
"    Outpatient Physical Therapy Ortho Progress Note  [facility]     Patient Name: Christal Holder  : 1975  MRN: [id number]  Today's Date: 2021      Visit Date: 2021  Attendance:  (45/yr on primary, 14 used in )  Subjective Improvement: \"Maybe 50%\"  Next MD Appt: 3 months  Recert Date: 21     Therapy Diagnosis: chronic R shoulder pain and immobility s/p UT accident     Changes in Medications: dosage changes of Trulicity and ADD medication   Changes in MD Orders: none  Number of Work Days Lost: n/a       Past Medical History:   Diagnosis Date   • Anxiety    • Atelectasis    • Chronic pain disorder    • Compression fracture of body of thoracic vertebra (CMS/HCC)     T6-T12   • COPD (chronic obstructive pulmonary disease) (CMS/HCC)     due to trauma   • Depression    • Diabetes mellitus (CMS/HCC)    • Glenoid fracture of shoulder    • Pneumothorax     due to rib fractures   • Rib fracture    • Scapular fracture    • Severe episode of recurrent major depressive disorder, without psychotic features (CMS/HCC)         Past Surgical History:   Procedure Laterality Date   • CHOLECYSTECTOMY     • GLENOID OPEN REDUCTION INTERNAL FIXATION Right    • SCAPULA OPEN REDUCTION INTERNAL FIXATION     • TUBAL ABDOMINAL LIGATION         Visit Dx:     ICD-10-CM ICD-9-CM   1. Chronic right shoulder pain  M25.511 719.41    G89.29 338.29   2. Mid back pain  M54.9 724.5   3. Right shoulder pain, unspecified chronicity  M25.511 719.41   4. Other chronic pain  G89.29 338.29             PT Ortho     Row Name 21 1100       Subjective Comments    Subjective Comments  Saw Dr. López . No medication changes. Shoulder feels \"all stove up and swollen.\" Tried to hang up some clothes yesterday. \"Between my shoulder and my back, my body didn't like it.\" \"Maybe 50% better.\"  -SS       Subjective Pain    Able to rate subjective pain?  yes  -SS    Pre-Treatment Pain Level  6  -SS    Post-Treatment Pain Level "  4  -SS       Cervical/Thoracic Special Tests    Cervical/Thoracic Special Tests Comments  Increased tone and TTP R levator scapula and UT. TTP R rhomboid at insertion of medial scapular border.  -SS       Right Upper Ext    Rt Shoulder Abduction AROM  85 deg, pain; 115 deg post-needling  -SS    Rt Shoulder Extension AROM  20 deg, pain; 31 deg post-needling  -SS    Rt Shoulder Flexion AROM  80 deg, pain; 105 deg post-needling  -SS      User Key  (r) = Recorded By, (t) = Taken By, (c) = Cosigned By    Initials Name Provider Type    Alfonzo Pat, PT DPT Physical Therapist              PT OP Goals     Row Name 01/22/21 1100          PT Short Term Goals    STG Date to Achieve  02/12/21  -     STG 1  Note a >/= 15% subjective improvement.  -SS     STG 1 Progress  Met  -     STG 2  QuickDASH score to be </= 70.  -SS     STG 2 Progress  Not Met;Ongoing  -SS     STG 3  Increase cervical flexion to >/= 25 deg.  -SS     STG 3 Progress  Met  -     STG 4  Increase R shoulder active flexion to >/= 75 deg.  -SS     STG 4 Progress  Met  -SS     STG 5  Increase R shoulder active extension to >/= 40 deg.  -     STG 5 Progress  Not Met;Ongoing  -        Long Term Goals    LTG Date to Achieve  03/05/21  -     LTG 1  Independent with HEP.  -SS     LTG 1 Progress  Not Met;Ongoing  -     LTG 2  QuickDASH score to be </= 50.  -     LTG 2 Progress  Not Met;Ongoing  -     LTG 3  R shoulder active elevation to 90 deg.  -     LTG 3 Progress  Not Met;Ongoing  -        Time Calculation    PT Goal Re-Cert Due Date  02/12/21  -       User Key  (r) = Recorded By, (t) = Taken By, (c) = Cosigned By    Initials Name Provider Type    Alfonzo Pat, PT DPT Physical Therapist          PT Assessment/Plan     Row Name 01/22/21 1100          PT Assessment    Functional Limitations  Limitation in home management;Limitations in community activities;Limitations in functional capacity and performance;Performance  "in leisure activities;Performance in self-care ADL;Performance in work activities  -     Impairments  Pain;Range of motion;Motor function;Muscle strength  -     Assessment Comments  Twitch response with decreased pain and muscle tension in response to treatment this date. Patient had a significant increase in shoulder active flexion, extension, and abduction post-needling.  -     Rehab Potential  Fair barrier: chronicity, suprascapular nerve injury  -     Patient/caregiver participated in establishment of treatment plan and goals  Yes  -SS     Patient would benefit from skilled therapy intervention  Yes  -SS        PT Plan    PT Frequency  2x/week  -     Predicted Duration of Therapy Intervention (PT)  5-6 weeks with further to be determined  -     PT Plan Comments  ROM, stretching, strengthening, MFR, dry needling, MHP  -       User Key  (r) = Recorded By, (t) = Taken By, (c) = Cosigned By    Initials Name Provider Type    Alfonzo Pat, PT DPT Physical Therapist          Modalities     Row Name 01/22/21 1100             Moist Heat    MH Applied  Yes  -SS      Location  R shoulder and trunk  -      Rx Minutes  15 mins  -SS      MH Prior to Rx  No  -SS      MH S/P Rx  Yes  -SS        User Key  (r) = Recorded By, (t) = Taken By, (c) = Cosigned By    Initials Name Provider Type    Alfonzo Pat, PT DPT Physical Therapist        OP Exercises     Row Name 01/22/21 1100             Subjective Comments    Subjective Comments  Saw Dr. López 1/20. No medication changes. Shoulder feels \"all stove up and swollen.\" Tried to hang up some clothes yesterday. \"Between my shoulder and my back, my body didn't like it.\" \"Maybe 50% better.\"  -SS         Subjective Pain    Able to rate subjective pain?  yes  -SS      Pre-Treatment Pain Level  6  -SS      Post-Treatment Pain Level  4  -SS         Exercise 1    Exercise Name 1  recheck  -         Exercise 2    Exercise Name 2  see Manual  -      "    Exercise 3    Exercise Name 3  see Modalities  -        User Key  (r) = Recorded By, (t) = Taken By, (c) = Cosigned By    Initials Name Provider Type     Alfonzo Maxwell, PT DPT Physical Therapist                  Time Calculation:     Start Time: 1118  Stop Time: 1202  Time Calculation (min): 44 min  Total Timed Code Minutes- PT: 25 minute(s)     Therapy Charges for Today     Code Description Service Date Service Provider Modifiers Qty    96299931436 HC PT MANUAL THERAPY EA 15 MIN 1/22/2021 Alfonzo Maxwell, PT DPT GP 2    40100422011 HC PT THER SUPP EA 15 MIN 1/22/2021 Alfonzo Maxwell, PT DPT GP 1                    Alfonzo Maxwell, PT, DPT, CHT  1/22/2021

## 2021-01-26 ENCOUNTER — HOSPITAL ENCOUNTER (OUTPATIENT)
Dept: PHYSICAL THERAPY | Facility: HOSPITAL | Age: 46
Setting detail: THERAPIES SERIES
Discharge: HOME | End: 2021-01-26

## 2021-01-26 DIAGNOSIS — G89.29 CHRONIC RIGHT SHOULDER PAIN: Primary | ICD-10-CM

## 2021-01-26 DIAGNOSIS — M54.9 MID BACK PAIN: ICD-10-CM

## 2021-01-26 DIAGNOSIS — M25.511 RIGHT SHOULDER PAIN, UNSPECIFIED CHRONICITY: ICD-10-CM

## 2021-01-26 DIAGNOSIS — G89.29 OTHER CHRONIC PAIN: ICD-10-CM

## 2021-01-26 DIAGNOSIS — M25.511 CHRONIC RIGHT SHOULDER PAIN: Primary | ICD-10-CM

## 2021-01-26 PROCEDURE — 97140 MANUAL THERAPY 1/> REGIONS: CPT | Performed by: PHYSICAL THERAPIST

## 2021-01-26 NOTE — THERAPY TREATMENT NOTE
"    Outpatient Physical Therapy Ortho Treatment Note  [facility]     Patient Name: Christal Holder  : 1975  MRN: [id number]  Today's Date: 2021      Visit Date: 2021  Attendance:  (45/yr on primary, 14 used in )  Subjective Improvement: \"Maybe 50%\"  Next MD Appt: 3 months  Recert Date: 21     Therapy Diagnosis: chronic R shoulder pain and immobility s/p UT accident     Visit Dx:    ICD-10-CM ICD-9-CM   1. Chronic right shoulder pain  M25.511 719.41    G89.29 338.29   2. Mid back pain  M54.9 724.5   3. Right shoulder pain, unspecified chronicity  M25.511 719.41   4. Other chronic pain  G89.29 338.29            Past Medical History:   Diagnosis Date   • Anxiety    • Atelectasis    • Chronic pain disorder    • Compression fracture of body of thoracic vertebra (CMS/HCC)     T6-T12   • COPD (chronic obstructive pulmonary disease) (CMS/HCC)     due to trauma   • Depression    • Diabetes mellitus (CMS/HCC)    • Glenoid fracture of shoulder    • Pneumothorax     due to rib fractures   • Rib fracture    • Scapular fracture    • Severe episode of recurrent major depressive disorder, without psychotic features (CMS/HCC)         Past Surgical History:   Procedure Laterality Date   • CHOLECYSTECTOMY     • GLENOID OPEN REDUCTION INTERNAL FIXATION Right    • SCAPULA OPEN REDUCTION INTERNAL FIXATION     • TUBAL ABDOMINAL LIGATION         PT Ortho     Row Name 21 1600       Subjective Comments    Subjective Comments  \"I'm hurting today.\" Thinks that it is related to \"stress/tension.\" 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 Has been spending time trying to arrange for supplies, materials, and living arrangements for their business. Cold weather irritating her shoulder as well. Shoulder was stiff after last therapy session. ~50% subjective improvement,  -SS       Subjective Pain    Able to rate subjective pain?  yes  -SS    Pre-Treatment Pain Level  " 7  -SS    Post-Treatment Pain Level  5  -SS       Cervical/Thoracic Special Tests    Cervical/Thoracic Special Tests Comments  Increased tone R>L upper trap, levator scapula, and thoracic paraspinals  -      User Key  (r) = Recorded By, (t) = Taken By, (c) = Cosigned By    Initials Name Provider Type     Alfonzo Maxwell, PT DPT Physical Therapist              PT Assessment/Plan     Row Name 01/26/21 1600          PT Assessment    Functional Limitations  Limitation in home management;Limitations in community activities;Limitations in functional capacity and performance;Performance in leisure activities;Performance in self-care ADL;Performance in work activities  -     Impairments  Pain;Range of motion;Motor function;Muscle strength  -     Assessment Comments  Decreased pain and muscle relaxation noted with MFR.   -     Rehab Potential  Fair barrier: chronicity, suprascapular nerve injury  -     Patient/caregiver participated in establishment of treatment plan and goals  Yes  -SS     Patient would benefit from skilled therapy intervention  Yes  -SS        PT Plan    PT Frequency  2x/week  -     Predicted Duration of Therapy Intervention (PT)  5-6 weeks with further to be determined  -     PT Plan Comments  Continue POC  -       User Key  (r) = Recorded By, (t) = Taken By, (c) = Cosigned By    Initials Name Provider Type     Alfonzo Maxwell, PT DPT Physical Therapist          Modalities     Row Name 01/26/21 1600             Moist Heat    MH Applied  Yes  -      Location  R shoulder and trunk  -      Rx Minutes  15 mins  -SS      MH Prior to Rx  No  -SS      MH S/P Rx  Yes  -SS        User Key  (r) = Recorded By, (t) = Taken By, (c) = Cosigned By    Initials Name Provider Type     Alfonzo Maxwell, PT DPT Physical Therapist              Manual Rx (last 36 hours)      Manual Treatments     Row Name 01/26/21 1700             Manual Rx 1    Manual Rx 1 Location  B UT, levator  scapula, and thoracic paraspinals  -      Manual Rx 1 Type  MFR  -      Manual Rx 1 Duration  25 mins  -        User Key  (r) = Recorded By, (t) = Taken By, (c) = Cosigned By    Initials Name Provider Type    Alfonzo Pat, PT DPT Physical Therapist          PT OP Goals     Row Name 01/26/21 1600          PT Short Term Goals    STG Date to Achieve  02/12/21  -     STG 1  Note a >/= 15% subjective improvement.  -SS     STG 1 Progress  Met  -     STG 2  QuickDASH score to be </= 70.  -SS     STG 2 Progress  Not Met;Ongoing  -SS     STG 3  Increase cervical flexion to >/= 25 deg.  -SS     STG 3 Progress  Met  -SS     STG 4  Increase R shoulder active flexion to >/= 75 deg.  -SS     STG 4 Progress  Met  -SS     STG 5  Increase R shoulder active extension to >/= 40 deg.  -SS     STG 5 Progress  Not Met;Ongoing  -        Long Term Goals    LTG Date to Achieve  03/05/21  -     LTG 1  Independent with HEP.  -SS     LTG 1 Progress  Not Met;Ongoing  -     LTG 2  QuickDASH score to be </= 50.  -SS     LTG 2 Progress  Not Met;Ongoing  -     LTG 3  R shoulder active elevation to 90 deg.  -     LTG 3 Progress  Not Met;Ongoing  -        Time Calculation    PT Goal Re-Cert Due Date  02/12/21  -       User Key  (r) = Recorded By, (t) = Taken By, (c) = Cosigned By    Initials Name Provider Type    SS Alfonzo Maxwell, PT DPT Physical Therapist                         Time Calculation:   Start Time: 1653  Stop Time: 1740  Time Calculation (min): 47 min  Total Timed Code Minutes- PT: 30 minute(s)  Therapy Charges for Today     Code Description Service Date Service Provider Modifiers Qty    68370335601 HC PT MANUAL THERAPY EA 15 MIN 1/26/2021 Alfonzo Maxwell, PT DPT GP 2    55676013041 HC PT THER SUPP EA 15 MIN 1/26/2021 Alfonzo Maxwell, PT DPT GP 1                    Alfonzo Maxwell, PT, DPT, CHT  1/26/2021

## 2021-01-29 ENCOUNTER — APPOINTMENT (OUTPATIENT)
Dept: PHYSICAL THERAPY | Facility: HOSPITAL | Age: 46
End: 2021-01-29

## 2021-02-02 ENCOUNTER — HOSPITAL ENCOUNTER (OUTPATIENT)
Dept: PHYSICAL THERAPY | Facility: HOSPITAL | Age: 46
Setting detail: THERAPIES SERIES
Discharge: HOME | End: 2021-02-02

## 2021-02-02 DIAGNOSIS — M54.9 MID BACK PAIN: ICD-10-CM

## 2021-02-02 DIAGNOSIS — G89.29 CHRONIC RIGHT SHOULDER PAIN: Primary | ICD-10-CM

## 2021-02-02 DIAGNOSIS — M25.511 RIGHT SHOULDER PAIN, UNSPECIFIED CHRONICITY: ICD-10-CM

## 2021-02-02 DIAGNOSIS — M25.511 CHRONIC RIGHT SHOULDER PAIN: Primary | ICD-10-CM

## 2021-02-02 PROCEDURE — 97140 MANUAL THERAPY 1/> REGIONS: CPT | Performed by: PHYSICAL THERAPIST

## 2021-02-02 PROCEDURE — 97110 THERAPEUTIC EXERCISES: CPT | Performed by: PHYSICAL THERAPIST
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
## 2021-02-05 ENCOUNTER — HOSPITAL ENCOUNTER (OUTPATIENT)
Dept: PHYSICAL THERAPY | Facility: HOSPITAL | Age: 46
Setting detail: THERAPIES SERIES
Discharge: HOME | End: 2021-02-05

## 2021-02-05 DIAGNOSIS — M54.9 MID BACK PAIN: ICD-10-CM

## 2021-02-05 DIAGNOSIS — G89.29 OTHER CHRONIC PAIN: ICD-10-CM

## 2021-02-05 DIAGNOSIS — G89.29 CHRONIC RIGHT SHOULDER PAIN: Primary | ICD-10-CM

## 2021-02-05 DIAGNOSIS — M25.511 CHRONIC RIGHT SHOULDER PAIN: Primary | ICD-10-CM

## 2021-02-05 DIAGNOSIS — M54.9 DORSALGIA, UNSPECIFIED: ICD-10-CM

## 2021-02-05 PROCEDURE — 97110 THERAPEUTIC EXERCISES: CPT | Performed by: PHYSICAL THERAPIST
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
## 2021-02-09 ENCOUNTER — HOSPITAL ENCOUNTER (OUTPATIENT)
Dept: PHYSICAL THERAPY | Facility: HOSPITAL | Age: 46
Setting detail: THERAPIES SERIES
Discharge: HOME | End: 2021-02-09

## 2021-02-09 DIAGNOSIS — G89.29 OTHER CHRONIC PAIN: ICD-10-CM

## 2021-02-09 DIAGNOSIS — M25.511 CHRONIC RIGHT SHOULDER PAIN: Primary | ICD-10-CM

## 2021-02-09 DIAGNOSIS — M54.9 MID BACK PAIN: ICD-10-CM

## 2021-02-09 DIAGNOSIS — G89.29 CHRONIC RIGHT SHOULDER PAIN: Primary | ICD-10-CM

## 2021-02-09 DIAGNOSIS — M54.9 DORSALGIA, UNSPECIFIED: ICD-10-CM

## 2021-02-09 PROCEDURE — 97110 THERAPEUTIC EXERCISES: CPT | Performed by: PHYSICAL THERAPIST
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
## 2021-02-12 ENCOUNTER — APPOINTMENT (OUTPATIENT)
Dept: PHYSICAL THERAPY | Facility: HOSPITAL | Age: 46
End: 2021-02-12

## 2021-02-17 ENCOUNTER — APPOINTMENT (OUTPATIENT)
Dept: PHYSICAL THERAPY | Facility: HOSPITAL | Age: 46
End: 2021-02-17

## 2021-02-19 ENCOUNTER — HOSPITAL ENCOUNTER (OUTPATIENT)
Dept: PHYSICAL THERAPY | Facility: HOSPITAL | Age: 46
Setting detail: THERAPIES SERIES
Discharge: HOME | End: 2021-02-19

## 2021-02-19 DIAGNOSIS — M25.511 CHRONIC RIGHT SHOULDER PAIN: Primary | ICD-10-CM

## 2021-02-19 DIAGNOSIS — G89.29 CHRONIC RIGHT SHOULDER PAIN: Primary | ICD-10-CM

## 2021-02-19 DIAGNOSIS — M54.9 MID BACK PAIN: ICD-10-CM

## 2021-02-19 DIAGNOSIS — G89.29 OTHER CHRONIC PAIN: ICD-10-CM

## 2021-02-19 DIAGNOSIS — M54.9 DORSALGIA, UNSPECIFIED: ICD-10-CM

## 2021-02-19 PROCEDURE — 97110 THERAPEUTIC EXERCISES: CPT | Performed by: PHYSICAL THERAPIST
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
## 2021-02-22 ENCOUNTER — HOSPITAL ENCOUNTER (OUTPATIENT)
Dept: PHYSICAL THERAPY | Facility: HOSPITAL | Age: 46
Setting detail: THERAPIES SERIES
Discharge: HOME | End: 2021-02-22

## 2021-02-22 DIAGNOSIS — G89.29 OTHER CHRONIC PAIN: ICD-10-CM

## 2021-02-22 DIAGNOSIS — M54.9 DORSALGIA, UNSPECIFIED: ICD-10-CM

## 2021-02-22 DIAGNOSIS — M25.511 CHRONIC RIGHT SHOULDER PAIN: Primary | ICD-10-CM

## 2021-02-22 DIAGNOSIS — M54.9 MID BACK PAIN: ICD-10-CM

## 2021-02-22 DIAGNOSIS — G89.29 CHRONIC RIGHT SHOULDER PAIN: Primary | ICD-10-CM

## 2021-02-22 PROCEDURE — 97110 THERAPEUTIC EXERCISES: CPT | Performed by: PHYSICAL THERAPIST
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
## 2021-02-26 ENCOUNTER — HOSPITAL ENCOUNTER (OUTPATIENT)
Dept: PHYSICAL THERAPY | Facility: HOSPITAL | Age: 46
Setting detail: THERAPIES SERIES
Discharge: HOME | End: 2021-02-26

## 2021-02-26 DIAGNOSIS — M54.9 DORSALGIA, UNSPECIFIED: ICD-10-CM

## 2021-02-26 DIAGNOSIS — M54.9 MID BACK PAIN: ICD-10-CM

## 2021-02-26 DIAGNOSIS — M25.511 CHRONIC RIGHT SHOULDER PAIN: Primary | ICD-10-CM

## 2021-02-26 DIAGNOSIS — M25.511 RIGHT SHOULDER PAIN, UNSPECIFIED CHRONICITY: ICD-10-CM

## 2021-02-26 DIAGNOSIS — G89.29 CHRONIC RIGHT SHOULDER PAIN: Primary | ICD-10-CM

## 2021-02-26 DIAGNOSIS — G89.29 OTHER CHRONIC PAIN: ICD-10-CM

## 2021-02-26 PROCEDURE — 97110 THERAPEUTIC EXERCISES: CPT | Performed by: PHYSICAL THERAPIST
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
## 2021-03-01 ENCOUNTER — HOSPITAL ENCOUNTER (OUTPATIENT)
Dept: PHYSICAL THERAPY | Facility: HOSPITAL | Age: 46
Setting detail: THERAPIES SERIES
Discharge: HOME | End: 2021-03-01

## 2021-03-01 DIAGNOSIS — G89.29 CHRONIC RIGHT SHOULDER PAIN: Primary | ICD-10-CM

## 2021-03-01 DIAGNOSIS — M54.9 DORSALGIA, UNSPECIFIED: ICD-10-CM

## 2021-03-01 DIAGNOSIS — G89.29 OTHER CHRONIC PAIN: ICD-10-CM

## 2021-03-01 DIAGNOSIS — M25.511 CHRONIC RIGHT SHOULDER PAIN: Primary | ICD-10-CM

## 2021-03-01 DIAGNOSIS — M54.9 MID BACK PAIN: ICD-10-CM

## 2021-03-01 PROCEDURE — 97110 THERAPEUTIC EXERCISES: CPT | Performed by: PHYSICAL THERAPIST
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
## 2021-03-03 ENCOUNTER — HOSPITAL ENCOUNTER (OUTPATIENT)
Dept: PHYSICAL THERAPY | Facility: HOSPITAL | Age: 46
Setting detail: THERAPIES SERIES
Discharge: HOME | End: 2021-03-03

## 2021-03-03 DIAGNOSIS — G89.29 OTHER CHRONIC PAIN: ICD-10-CM

## 2021-03-03 DIAGNOSIS — G89.29 CHRONIC RIGHT SHOULDER PAIN: Primary | ICD-10-CM

## 2021-03-03 DIAGNOSIS — M54.9 MID BACK PAIN: ICD-10-CM

## 2021-03-03 DIAGNOSIS — M25.511 CHRONIC RIGHT SHOULDER PAIN: Primary | ICD-10-CM

## 2021-03-03 DIAGNOSIS — M54.9 DORSALGIA, UNSPECIFIED: ICD-10-CM

## 2021-03-03 PROCEDURE — 97110 THERAPEUTIC EXERCISES: CPT | Performed by: PHYSICAL THERAPIST
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
## 2021-03-08 ENCOUNTER — HOSPITAL ENCOUNTER (OUTPATIENT)
Dept: PHYSICAL THERAPY | Facility: HOSPITAL | Age: 46
Setting detail: THERAPIES SERIES
Discharge: HOME | End: 2021-03-08

## 2021-03-08 DIAGNOSIS — M54.9 DORSALGIA, UNSPECIFIED: ICD-10-CM

## 2021-03-08 DIAGNOSIS — M54.9 MID BACK PAIN: ICD-10-CM

## 2021-03-08 DIAGNOSIS — M25.511 CHRONIC RIGHT SHOULDER PAIN: Primary | ICD-10-CM

## 2021-03-08 DIAGNOSIS — G89.29 OTHER CHRONIC PAIN: ICD-10-CM

## 2021-03-08 DIAGNOSIS — G89.29 CHRONIC RIGHT SHOULDER PAIN: Primary | ICD-10-CM

## 2021-03-08 PROCEDURE — 97110 THERAPEUTIC EXERCISES: CPT | Performed by: PHYSICAL THERAPIST
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
## 2021-03-10 ENCOUNTER — HOSPITAL ENCOUNTER (OUTPATIENT)
Dept: PHYSICAL THERAPY | Facility: HOSPITAL | Age: 46
Setting detail: THERAPIES SERIES
Discharge: HOME | End: 2021-03-10

## 2021-03-10 DIAGNOSIS — G89.29 OTHER CHRONIC PAIN: ICD-10-CM

## 2021-03-10 DIAGNOSIS — M54.9 DORSALGIA, UNSPECIFIED: ICD-10-CM

## 2021-03-10 DIAGNOSIS — M25.511 CHRONIC RIGHT SHOULDER PAIN: Primary | ICD-10-CM

## 2021-03-10 DIAGNOSIS — G89.29 CHRONIC RIGHT SHOULDER PAIN: Primary | ICD-10-CM

## 2021-03-10 DIAGNOSIS — M54.9 MID BACK PAIN: ICD-10-CM

## 2021-03-10 PROCEDURE — 97110 THERAPEUTIC EXERCISES: CPT | Performed by: PHYSICAL THERAPIST
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
## 2021-03-16 ENCOUNTER — HOSPITAL ENCOUNTER (OUTPATIENT)
Dept: PHYSICAL THERAPY | Facility: HOSPITAL | Age: 46
Setting detail: THERAPIES SERIES
Discharge: HOME | End: 2021-03-16

## 2021-03-16 DIAGNOSIS — M54.9 DORSALGIA, UNSPECIFIED: ICD-10-CM

## 2021-03-16 DIAGNOSIS — M25.511 CHRONIC RIGHT SHOULDER PAIN: Primary | ICD-10-CM

## 2021-03-16 DIAGNOSIS — M54.9 MID BACK PAIN: ICD-10-CM

## 2021-03-16 DIAGNOSIS — G89.29 CHRONIC RIGHT SHOULDER PAIN: Primary | ICD-10-CM

## 2021-03-16 DIAGNOSIS — G89.29 OTHER CHRONIC PAIN: ICD-10-CM

## 2021-03-16 PROCEDURE — 97110 THERAPEUTIC EXERCISES: CPT | Performed by: PHYSICAL THERAPIST
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
## 2021-03-19 ENCOUNTER — APPOINTMENT (OUTPATIENT)
Dept: PHYSICAL THERAPY | Facility: HOSPITAL | Age: 46
End: 2021-03-19

## 2021-03-22 ENCOUNTER — HOSPITAL ENCOUNTER (OUTPATIENT)
Dept: PHYSICAL THERAPY | Facility: HOSPITAL | Age: 46
Setting detail: THERAPIES SERIES
Discharge: HOME | End: 2021-03-22

## 2021-03-22 DIAGNOSIS — G89.29 CHRONIC RIGHT SHOULDER PAIN: Primary | ICD-10-CM

## 2021-03-22 DIAGNOSIS — M54.9 DORSALGIA, UNSPECIFIED: ICD-10-CM

## 2021-03-22 DIAGNOSIS — M54.9 MID BACK PAIN: ICD-10-CM

## 2021-03-22 DIAGNOSIS — M25.511 CHRONIC RIGHT SHOULDER PAIN: Primary | ICD-10-CM

## 2021-03-22 DIAGNOSIS — G89.29 OTHER CHRONIC PAIN: ICD-10-CM

## 2021-03-22 PROCEDURE — 97110 THERAPEUTIC EXERCISES: CPT | Performed by: PHYSICAL THERAPIST
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
## 2021-03-24 ENCOUNTER — HOSPITAL ENCOUNTER (OUTPATIENT)
Dept: PHYSICAL THERAPY | Facility: HOSPITAL | Age: 46
Setting detail: THERAPIES SERIES
Discharge: HOME | End: 2021-03-24

## 2021-03-24 DIAGNOSIS — M25.511 CHRONIC RIGHT SHOULDER PAIN: Primary | ICD-10-CM

## 2021-03-24 DIAGNOSIS — M54.9 DORSALGIA, UNSPECIFIED: ICD-10-CM

## 2021-03-24 DIAGNOSIS — G89.29 OTHER CHRONIC PAIN: ICD-10-CM

## 2021-03-24 DIAGNOSIS — G89.29 CHRONIC RIGHT SHOULDER PAIN: Primary | ICD-10-CM

## 2021-03-24 DIAGNOSIS — M54.9 MID BACK PAIN: ICD-10-CM

## 2021-03-24 PROCEDURE — 97110 THERAPEUTIC EXERCISES: CPT | Performed by: PHYSICAL THERAPIST
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
## 2021-03-29 ENCOUNTER — HOSPITAL ENCOUNTER (OUTPATIENT)
Dept: PHYSICAL THERAPY | Facility: HOSPITAL | Age: 46
Setting detail: THERAPIES SERIES
Discharge: HOME | End: 2021-03-29

## 2021-03-29 DIAGNOSIS — G89.29 OTHER CHRONIC PAIN: ICD-10-CM

## 2021-03-29 DIAGNOSIS — M54.9 MID BACK PAIN: ICD-10-CM

## 2021-03-29 DIAGNOSIS — G89.29 CHRONIC RIGHT SHOULDER PAIN: Primary | ICD-10-CM

## 2021-03-29 DIAGNOSIS — M54.9 DORSALGIA, UNSPECIFIED: ICD-10-CM

## 2021-03-29 DIAGNOSIS — M25.511 CHRONIC RIGHT SHOULDER PAIN: Primary | ICD-10-CM

## 2021-03-29 PROCEDURE — 97110 THERAPEUTIC EXERCISES: CPT | Performed by: PHYSICAL THERAPIST
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
## 2021-03-31 ENCOUNTER — HOSPITAL ENCOUNTER (OUTPATIENT)
Dept: PHYSICAL THERAPY | Facility: HOSPITAL | Age: 46
Setting detail: THERAPIES SERIES
Discharge: HOME | End: 2021-03-31

## 2021-03-31 DIAGNOSIS — M54.9 DORSALGIA, UNSPECIFIED: ICD-10-CM

## 2021-03-31 DIAGNOSIS — G89.29 CHRONIC RIGHT SHOULDER PAIN: Primary | ICD-10-CM

## 2021-03-31 DIAGNOSIS — M25.511 CHRONIC RIGHT SHOULDER PAIN: Primary | ICD-10-CM

## 2021-03-31 DIAGNOSIS — G89.29 OTHER CHRONIC PAIN: ICD-10-CM

## 2021-03-31 DIAGNOSIS — M54.9 MID BACK PAIN: ICD-10-CM

## 2021-03-31 PROCEDURE — 97110 THERAPEUTIC EXERCISES: CPT | Performed by: PHYSICAL THERAPIST

## 2021-04-12 ENCOUNTER — HOSPITAL ENCOUNTER (OUTPATIENT)
Dept: PHYSICAL THERAPY | Facility: HOSPITAL | Age: 46
Setting detail: THERAPIES SERIES
Discharge: HOME | End: 2021-04-12

## 2021-04-12 DIAGNOSIS — M54.9 MID BACK PAIN: ICD-10-CM

## 2021-04-12 DIAGNOSIS — M54.9 DORSALGIA, UNSPECIFIED: ICD-10-CM

## 2021-04-12 DIAGNOSIS — M25.511 CHRONIC RIGHT SHOULDER PAIN: Primary | ICD-10-CM

## 2021-04-12 DIAGNOSIS — G89.29 OTHER CHRONIC PAIN: ICD-10-CM

## 2021-04-12 DIAGNOSIS — G89.29 CHRONIC RIGHT SHOULDER PAIN: Primary | ICD-10-CM

## 2021-04-12 PROCEDURE — 97110 THERAPEUTIC EXERCISES: CPT | Performed by: PHYSICAL THERAPIST
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
## 2021-04-19 ENCOUNTER — HOSPITAL ENCOUNTER (OUTPATIENT)
Dept: PHYSICAL THERAPY | Facility: HOSPITAL | Age: 46
Setting detail: THERAPIES SERIES
Discharge: HOME | End: 2021-04-19

## 2021-04-19 DIAGNOSIS — M54.9 MID BACK PAIN: ICD-10-CM

## 2021-04-19 DIAGNOSIS — G89.29 OTHER CHRONIC PAIN: ICD-10-CM

## 2021-04-19 DIAGNOSIS — G89.29 CHRONIC RIGHT SHOULDER PAIN: Primary | ICD-10-CM

## 2021-04-19 DIAGNOSIS — M25.511 CHRONIC RIGHT SHOULDER PAIN: Primary | ICD-10-CM

## 2021-04-19 DIAGNOSIS — M54.9 DORSALGIA, UNSPECIFIED: ICD-10-CM

## 2021-04-19 PROCEDURE — 97110 THERAPEUTIC EXERCISES: CPT | Performed by: PHYSICAL THERAPIST
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
## 2021-04-21 ENCOUNTER — APPOINTMENT (OUTPATIENT)
Dept: PHYSICAL THERAPY | Facility: HOSPITAL | Age: 46
End: 2021-04-21

## 2021-04-26 ENCOUNTER — APPOINTMENT (OUTPATIENT)
Dept: PHYSICAL THERAPY | Facility: HOSPITAL | Age: 46
End: 2021-04-26

## 2021-04-28 ENCOUNTER — APPOINTMENT (OUTPATIENT)
Dept: PHYSICAL THERAPY | Facility: HOSPITAL | Age: 46
End: 2021-04-28

## 2021-11-11 ENCOUNTER — TRANSCRIBE ORDERS (OUTPATIENT)
Dept: PHYSICAL THERAPY | Facility: HOSPITAL | Age: 46
End: 2021-11-11

## 2021-11-11 DIAGNOSIS — G89.29 CHRONIC RIGHT SHOULDER PAIN: Primary | ICD-10-CM

## 2021-11-11 DIAGNOSIS — M54.9 MID BACK PAIN: ICD-10-CM

## 2021-11-11 DIAGNOSIS — M25.511 CHRONIC RIGHT SHOULDER PAIN: Primary | ICD-10-CM

## 2021-11-29 ENCOUNTER — APPOINTMENT (OUTPATIENT)
Dept: PHYSICAL THERAPY | Facility: HOSPITAL | Age: 46
End: 2021-11-29

## 2021-12-02 ENCOUNTER — HOSPITAL ENCOUNTER (OUTPATIENT)
Dept: PHYSICAL THERAPY | Facility: HOSPITAL | Age: 46
Setting detail: THERAPIES SERIES
Discharge: HOME | End: 2021-12-02

## 2021-12-02 DIAGNOSIS — M25.511 CHRONIC RIGHT SHOULDER PAIN: Primary | ICD-10-CM

## 2021-12-02 DIAGNOSIS — M54.9 MID BACK PAIN: ICD-10-CM

## 2021-12-02 DIAGNOSIS — G89.29 CHRONIC RIGHT SHOULDER PAIN: Primary | ICD-10-CM

## 2021-12-02 PROCEDURE — 97163 PT EVAL HIGH COMPLEX 45 MIN: CPT | Performed by: PHYSICAL THERAPIST
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
## 2021-12-17 ENCOUNTER — HOSPITAL ENCOUNTER (OUTPATIENT)
Dept: PHYSICAL THERAPY | Facility: HOSPITAL | Age: 46
Setting detail: THERAPIES SERIES
Discharge: HOME | End: 2021-12-17

## 2021-12-17 DIAGNOSIS — G89.29 CHRONIC RIGHT SHOULDER PAIN: Primary | ICD-10-CM

## 2021-12-17 DIAGNOSIS — M25.511 CHRONIC RIGHT SHOULDER PAIN: Primary | ICD-10-CM

## 2021-12-17 DIAGNOSIS — M54.9 MID BACK PAIN: ICD-10-CM

## 2021-12-17 PROCEDURE — 97140 MANUAL THERAPY 1/> REGIONS: CPT | Performed by: PHYSICAL THERAPIST

## 2021-12-17 PROCEDURE — 97164 PT RE-EVAL EST PLAN CARE: CPT | Performed by: PHYSICAL THERAPIST

## 2021-12-18 NOTE — THERAPY RE-EVALUATION
Outpatient Physical Therapy Ortho Re-Evaluation  [facility]     Patient Name: Christal Holder  : 1975  MRN: [id number]  Today's Date: 2021      Visit Date: 2021    Attendance: 2 (27 visits)  Subjective Improvement: n/a  Next MD Appt: to be scheduled  Recert Date: 22     Therapy Diagnosis: chronic R shoulder and mid-back pain    Changes in Medications: none noted  Changes in MD Orders: okay to start therapy  Number of Work Days Lost: n/a     Past Medical History:   Diagnosis Date   • Anxiety    • Atelectasis    • Chronic pain disorder    • Compression fracture of body of thoracic vertebra (HCC)     T6-T12   • COPD (chronic obstructive pulmonary disease) ([facility])     due to trauma   • Depression    • Diabetes mellitus ([facility])    • Glenoid fracture of shoulder    • Pneumothorax     due to rib fractures   • Rib fracture    • Scapular fracture    • Severe episode of recurrent major depressive disorder, without psychotic features ([facility])         Past Surgical History:   Procedure Laterality Date   • CHOLECYSTECTOMY     • GLENOID OPEN REDUCTION INTERNAL FIXATION Right    • SCAPULA OPEN REDUCTION INTERNAL FIXATION     • TUBAL ABDOMINAL LIGATION         Visit Dx:     ICD-10-CM ICD-9-CM   1. Chronic right shoulder pain  M25.511 719.41    G89.29 338.29   2. Mid back pain  M54.9 724.5              PT Ortho     Row Name 21 1300       Subjective Comments    Subjective Comments CT of shoulder and clavicle demonstrate mild shoulder OA. Otherwise, essentially normal exams. Continued pain R UT and brachium. Shoulder feels like it wants to give out if she weightbears through the elbow. During the day, she is able to weightbear through the R UE with the elbow extended, but shoulder would give at night. No medication changes.  -SS       Precautions and Contraindications    Precautions vertebral fxs with mal-union  -SS       Subjective Pain    Able to rate subjective pain? yes  -SS    Pre-Treatment  "Pain Level 4  -    Post-Treatment Pain Level 3  -       Posture/Observations    Posture/Observations Comments Forward head, rounded shoulders posture. R supraclavicular swelling. R supraspinatus atrophy.  -       Cervical/Thoracic Special Tests    Cervical/Thoracic Special Tests Comments TTP R first rib, R scalene triangle, UT. Question elevated R first rib.  -       Head/Neck/Trunk    Neck Extension AROM 55 deg  -SS    Neck Flexion AROM 25 deg; pull posterior cervical spine  -    Neck Lt Lateral Flexion AROM 15 deg; \"pulling\" on R UT  -SS    Neck Rt Lateral Flexion AROM 20 deg; stretch L lateral cervical spine and CT junction  -    Neck Lt Rotation AROM 40 deg; stiff  -SS    Neck Rt Rotation AROM 50 deg; \"I can feel it\"  -SS       Right Upper Ext    Rt Shoulder Abduction AROM 88 deg; pain  -SS    Rt Shoulder Extension AROM 31 deg; \"just a little\" pain  -SS    Rt Shoulder Flexion AROM 87 deg; pain  -SS    Rt Shoulder External Rotation AROM 17 deg; pain; measured in shoulder neutral position  -          User Key  (r) = Recorded By, (t) = Taken By, (c) = Cosigned By    Initials Name Provider Type    SS Alfonzo Maxwell, PT DPT Physical Therapist                            Therapy Education  Education Details: examination findings, plan  How Provided: Verbal  Provided to: Patient  Level of Understanding: Verbalized      PT OP Goals     Row Name 12/17/21 1300          PT Short Term Goals    STG Date to Achieve 01/07/22  -     STG 1 Note a >/= 10% subjective improvement.  -     STG 2 Complete QuickDASH and NDI.  -     STG 3 Cervical flexion AROM to be >/= 35 deg.  -     STG 4 Cervical L lateral flexion AROM to be >/= 20 deg.  -            Long Term Goals    LTG Date to Achieve 03/11/22  -     LTG 1 Independent with HEP/self-management.  -     LTG 2 CROM WFLs.  -     LTG 3 R shoulder flexion AROM to be >/= 120 deg.  -     LTG 4 NDI to be </ 15/50.  -     LTG 5    QuickDASH score to " be </= 30.  -     LTG 6    Able to weightbear through R UE with elbow flexed and extended without shoulder giving out.  -            Time Calculation    PT Goal Re-Cert Due Date 01/07/22  -           User Key  (r) = Recorded By, (t) = Taken By, (c) = Cosigned By    Initials Name Provider Type     Alfonzo Maxwell, PT DPT Physical Therapist                 PT Assessment/Plan     Row Name 12/17/21 1300          PT Assessment    Functional Limitations Limitation in home management; Limitations in community activities; Limitations in functional capacity and performance; Performance in leisure activities; Performance in self-care ADL; Performance in work activities  -     Impairments Range of motion; Pain; Joint mobility  -     Assessment Comments Patient cleared of pathologic processes in supraclavicular region and referred back to P.T. Shoulder flexion and abduction improved compared to evaluation. Cervical and R shoulder pain and loss of motion with pain and swelling in the R supraclavicular region. Cavitation noted in cervical spine with joint mobilization  -     Rehab Potential --  Good/fair. Barrier: chronicity since onset, recurrent shoulder and neck issues  -     Patient/caregiver participated in establishment of treatment plan and goals Yes  -     Patient would benefit from skilled therapy intervention Yes  -SS            PT Plan    PT Frequency 2x/week  -     Predicted Duration of Therapy Intervention (PT) 10-12 weeks  -     PT Plan Comments MFR, joint mobilization, manual trigger point release, ROM, gentle stretching, strengthening, possible dry needling, MHP  -           User Key  (r) = Recorded By, (t) = Taken By, (c) = Cosigned By    Initials Name Provider Type     Alfonzo Maxwell, PT DPT Physical Therapist                    Manual Rx (last 36 hours)     Manual Treatments     Row Name 12/17/21 1300             Manual Rx 1    Manual Rx 1 Location re-evaluation  -               Manual Rx 2    Manual Rx 2 Location R UT and scalene triangle region  -SS      Manual Rx 2 Type MFR  -SS              Manual Rx 3    Manual Rx 3 Location lower cervical spine  -SS      Manual Rx 3 Type joint mobilization  -SS      Manual Rx 3 Grade 3  -SS            User Key  (r) = Recorded By, (t) = Taken By, (c) = Cosigned By    Initials Name Provider Type    SS Alfonzo Maxwell, PT DPT Physical Therapist                                      Time Calculation:     Start Time: 1353  Stop Time: 1433  Time Calculation (min): 40 min     Therapy Charges for Today     Code Description Service Date Service Provider Modifiers Qty    23436679916 HC PT MANUAL THERAPY EA 15 MIN 12/17/2021 Alfonzo Maxwell, PT DPT GP 1    40802985829 HC PT RE-EVAL ESTABLISHED PLAN 2 12/17/2021 Alfonzo Maxwell, PT DPT GP 1                    Alfonzo Maxwell, PT, DPT, CHT  12/17/2021

## 2021-12-22 ENCOUNTER — HOSPITAL ENCOUNTER (OUTPATIENT)
Dept: PHYSICAL THERAPY | Facility: HOSPITAL | Age: 46
Setting detail: THERAPIES SERIES
Discharge: HOME | End: 2021-12-22

## 2021-12-22 DIAGNOSIS — M25.511 CHRONIC RIGHT SHOULDER PAIN: Primary | ICD-10-CM

## 2021-12-22 DIAGNOSIS — G89.29 CHRONIC RIGHT SHOULDER PAIN: Primary | ICD-10-CM

## 2021-12-22 PROCEDURE — 97110 THERAPEUTIC EXERCISES: CPT

## 2021-12-22 PROCEDURE — 97140 MANUAL THERAPY 1/> REGIONS: CPT

## 2021-12-22 PROCEDURE — 97035 APP MDLTY 1+ULTRASOUND EA 15: CPT

## 2021-12-22 NOTE — THERAPY TREATMENT NOTE
Outpatient Physical Therapy Ortho Treatment Note  [facility]     Patient Name: Christal Holder  : 1975  MRN: [id number]  Today's Date: 2021      Visit Date: 2021  Attendance: 3/3   Subjective improvement: n/a  Recert: 22  MD Appointment: TBD    Visit Dx:    ICD-10-CM ICD-9-CM   1. Chronic right shoulder pain  M25.511 719.41    G89.29 338.29       Patient Active Problem List   Diagnosis   • Suicide ideation   • Severe episode of recurrent major depressive disorder, without psychotic features ([facility])        Past Medical History:   Diagnosis Date   • Anxiety    • Atelectasis    • Chronic pain disorder    • Compression fracture of body of thoracic vertebra ([facility])     T6-T12   • COPD (chronic obstructive pulmonary disease) ([facility])     due to trauma   • Depression    • Diabetes mellitus ([facility])    • Glenoid fracture of shoulder    • Pneumothorax     due to rib fractures   • Rib fracture    • Scapular fracture    • Severe episode of recurrent major depressive disorder, without psychotic features ([facility])         Past Surgical History:   Procedure Laterality Date   • CHOLECYSTECTOMY     • GLENOID OPEN REDUCTION INTERNAL FIXATION Right    • SCAPULA OPEN REDUCTION INTERNAL FIXATION     • TUBAL ABDOMINAL LIGATION                          PT Assessment/Plan     Row Name 21 1600          PT Assessment    Functional Limitations Limitation in home management; Limitations in community activities; Limitations in functional capacity and performance; Performance in leisure activities; Performance in self-care ADL; Performance in work activities  -EM     Impairments Range of motion; Pain; Joint mobility  -EM     Assessment Comments Pt sam tx well with focus on modalities and manual to break the pain/spasm cycle. Pt sam well with improved pain post tx. Singificant tightness noted medial R UT, but did responde well to manual with improved muscle integrity after.  -EM     Patient/caregiver participated in  establishment of treatment plan and goals Yes  -EM     Patient would benefit from skilled therapy intervention Yes  -EM            PT Plan    PT Frequency 2x/week  -EM     Predicted Duration of Therapy Intervention (PT) 10-12 wks.  -EM     PT Plan Comments Cont manual to UT region and continue Combo. Initiate gentle  isometrics and possible AAROM pending tolerance.  -EM           User Key  (r) = Recorded By, (t) = Taken By, (c) = Cosigned By    Initials Name Provider Type    EM Melvin Broussard PTA Physical Therapy Assistant                 Modalities     Row Name 12/22/21 1500             Ultrasound [id number]    Location R UT  -EM      Combo RX Minutes 38791 12  -EM      Duty Cycle 50  -EM      Frequency 1.0 MHz  -EM      Intensity - Wts/cm 1.5  -EM            User Key  (r) = Recorded By, (t) = Taken By, (c) = Cosigned By    Initials Name Provider Type    EM Melvin Broussard, SUELLEN Physical Therapy Assistant               OP Exercises     Row Name 12/22/21 1500             Subjective Comments    Subjective Comments Pt reports her UT pain bothers her more than the actual shoulder.  -EM              Subjective Pain    Able to rate subjective pain? yes  -EM      Pre-Treatment Pain Level 5  -EM      Post-Treatment Pain Level 3  -EM              Exercise 1    Exercise Name 1 PRO II-Fwd with LE assist  -EM      Time 1 10'  -EM      Additional Comments 1.0  -EM              Exercise 2    Exercise Name 2 Manual/TPR R UT  -EM      Time 2 10'  -EM              Exercise 3    Exercise Name 3 Shoulder Shruggs/Rolls  -EM      Sets 3 1  -EM      Reps 3 20  -EM              Exercise 4    Exercise Name 4 Scap Retraction  -EM      Sets 4 1  -EM      Reps 4 20  -EM              Exercise 5    Exercise Name 5 Combo  -EM      Time 5 12'  -EM            User Key  (r) = Recorded By, (t) = Taken By, (c) = Cosigned By    Initials Name Provider Type    EM Melvin Broussard PTA Physical Therapy Assistant                         Manual Rx (last 36 hours)      Manual Treatments     Row Name 12/22/21 1500             Manual Rx 2    Manual Rx 2 Location R UT and scalene triangle region  -EM      Manual Rx 2 Type MFR with TPR  -EM      Manual Rx 2 Duration 10'  -EM            User Key  (r) = Recorded By, (t) = Taken By, (c) = Cosigned By    Initials Name Provider Type    EM Melvin Broussard, SUELLEN Physical Therapy Assistant                 PT OP Goals     Row Name 12/22/21 1600          PT Short Term Goals    STG Date to Achieve 01/07/22  -EM     STG 1 Note a >/= 10% subjective improvement.  -EM     STG 1 Progress Not Met  -EM     STG 2 Complete QuickDASH and NDI.  -EM     STG 2 Progress Not Met  -EM     STG 3 Cervical flexion AROM to be >/= 35 deg.  -EM     STG 3 Progress Not Met  -EM     STG 4 Cervical L lateral flexion AROM to be >/= 20 deg.  -EM     STG 4 Progress Not Met  -EM            Long Term Goals    LTG Date to Achieve 03/11/22  -EM     LTG 1 Independent with HEP/self-management.  -EM     LTG 1 Progress Not Met  -EM     LTG 2 CROM WFLs.  -EM     LTG 2 Progress Not Met  -EM     LTG 3 R shoulder flexion AROM to be >/= 120 deg.  -EM     LTG 3 Progress Not Met  -EM     LTG 4 NDI to be </ 15/50.  -EM     LTG 4 Progress Not Met  -EM     LTG 5 QuickDASH score to be </= 30.  -EM     LTG 5 Progress Not Met  -EM     LTG 6 Able to weightbear through R UE with elbow flexed and extended without shoulder giving out.  -SS  -EM     LTG 6 Progress Not Met  -EM            Time Calculation    PT Goal Re-Cert Due Date 01/07/21  -EM           User Key  (r) = Recorded By, (t) = Taken By, (c) = Cosigned By    Initials Name Provider Type    Melvin Martinez PTA Physical Therapy Assistant                               Time Calculation:   Start Time: 1451  Stop Time: 1552  Time Calculation (min): 61 min  Total Timed Code Minutes- PT: 61 minute(s)  Therapy Charges for Today     Code Description Service Date Service Provider Modifiers Qty    36300380517 HC PT ULTRASOUND EA 15 MIN  12/22/2021 Melvin Broussard, PTA GP 1    24578285914 HC PT THER PROC EA 15 MIN 12/22/2021 Melvin Broussard, PTA GP 2    [id number]  PT MANUAL THERAPY EA 15 MIN 12/22/2021 Melvin Broussard, PTA GP 1                    Melvin Broussard, PTA  12/22/2021

## 2022-01-04 ENCOUNTER — APPOINTMENT (OUTPATIENT)
Dept: PHYSICAL THERAPY | Facility: HOSPITAL | Age: 47
End: 2022-01-04

## 2022-01-06 ENCOUNTER — HOSPITAL ENCOUNTER (OUTPATIENT)
Dept: PHYSICAL THERAPY | Facility: HOSPITAL | Age: 47
Setting detail: THERAPIES SERIES
Discharge: HOME | End: 2022-01-06

## 2022-01-06 DIAGNOSIS — M54.9 MID BACK PAIN: ICD-10-CM

## 2022-01-06 DIAGNOSIS — G89.29 CHRONIC RIGHT SHOULDER PAIN: Primary | ICD-10-CM

## 2022-01-06 DIAGNOSIS — M25.511 CHRONIC RIGHT SHOULDER PAIN: Primary | ICD-10-CM

## 2022-01-06 PROCEDURE — 97140 MANUAL THERAPY 1/> REGIONS: CPT | Performed by: PHYSICAL THERAPIST

## 2022-01-06 NOTE — THERAPY PROGRESS REPORT/RE-CERT
Outpatient Physical Therapy Ortho Progress Note  [facility]     Patient Name: Christal Holder  : 1975  MRN: [id number]  Today's Date: 2022      Visit Date: 2022    Attendance:  (27 visits/yr 3 used in )  Subjective Improvement: 0%  Next MD Appt: ??  Recert Date: 22     Therapy Diagnosis: chronic R shoulder and mid-back pain     Changes in Medications: Metformin being changed to Synjadry; also prescribed Trulicity, no longer prescribed ozempic  Changes in MD Orders: none  Number of Work Days Lost: n/a     Past Medical History:   Diagnosis Date   • Anxiety    • Atelectasis    • Chronic pain disorder    • Compression fracture of body of thoracic vertebra (HCC)     T6-T12   • COPD (chronic obstructive pulmonary disease) ([facility])     due to trauma   • Depression    • Diabetes mellitus ([facility])    • Glenoid fracture of shoulder    • Pneumothorax     due to rib fractures   • Rib fracture    • Scapular fracture    • Severe episode of recurrent major depressive disorder, without psychotic features ([facility])         Past Surgical History:   Procedure Laterality Date   • CHOLECYSTECTOMY     • GLENOID OPEN REDUCTION INTERNAL FIXATION Right    • SCAPULA OPEN REDUCTION INTERNAL FIXATION     • TUBAL ABDOMINAL LIGATION         Visit Dx:     ICD-10-CM ICD-9-CM   1. Chronic right shoulder pain  M25.511 719.41    G89.29 338.29   2. Mid back pain  M54.9 724.5              PT Ortho     Row Name 22 0900       Subjective Comments    Subjective Comments Metformin being changed to Synjadry; also prescribed Trulicity, no longer prescribed ozempic. Okay overall. Swelling R upper trap region. 0% subjective improvement.  -SS       Precautions and Contraindications    Precautions vertebral fxs with mal-union  -SS       Subjective Pain    Able to rate subjective pain? yes  -SS    Pre-Treatment Pain Level 5  -SS    Post-Treatment Pain Level 4  -SS       Posture/Observations    Posture/Observations Comments  Forward head, rounded shoulders posture. R supraclavicular swelling. R supraspinatus atrophy.  -SS       Cervical/Thoracic Special Tests    Cervical/Thoracic Special Tests Comments Increased tone and TTP R scalenes, UT, cervical paraspinals. Increased tone L cervical paraspinals. TTP R 1st rib.  -SS       Head/Neck/Trunk    Neck Extension AROM 45 deg; tight posterior cervical spine  -SS    Neck Flexion AROM 35 deg  -    Neck Lt Lateral Flexion AROM 22 deg; tight R UT/levator scapula region  -SS    Neck Rt Lateral Flexion AROM 17 deg; tight L UT/levator scapula  -SS    Neck Lt Rotation AROM 45 deg; pain R UT region  -SS    Neck Rt Rotation AROM 50 deg; pain R UT region  -SS       Right Upper Ext    Rt Shoulder Abduction AROM 83 deg; pain  -SS    Rt Shoulder Extension AROM 20 deg; pain  -SS    Rt Shoulder Flexion AROM 80 deg; pian  -          User Key  (r) = Recorded By, (t) = Taken By, (c) = Cosigned By    Initials Name Provider Type    SS Alfonzo Maxwell, PT DPT Physical Therapist                            Therapy Education  Education Details: progress  How Provided: Verbal  Provided to: Patient  Level of Understanding: Verbalized      PT OP Goals     Row Name 01/06/22 0900          PT Short Term Goals    STG Date to Achieve 01/07/22  -     STG 1 Note a >/= 10% subjective improvement.  -     STG 1 Progress Not Met; Ongoing  -     STG 2 Complete QuickDASH and NDI.  -     STG 2 Progress Not Met; Ongoing  -     STG 3 Cervical flexion AROM to be >/= 35 deg.  -     STG 3 Progress Met  -     STG 4 Cervical L lateral flexion AROM to be >/= 20 deg.  -     STG 4 Progress Met  -            Long Term Goals    LTG Date to Achieve 03/11/22  -     LTG 1 Independent with HEP/self-management.  -     LTG 1 Progress Not Met; Ongoing  -     LTG 2 CROM WFLs.  -     LTG 2 Progress Not Met; Ongoing  -     LTG 3 R shoulder flexion AROM to be >/= 120 deg.  -     LTG 3 Progress Not Met; Ongoing  -      LTG 4 NDI to be </ 15/50.  -     LTG 4 Progress Not Met; Ongoing  -SS     LTG 5 QuickDASH score to be </= 30.  -     LTG 5 Progress Not Met; Ongoing  -SS     LTG 6 Able to weightbear through R UE with elbow flexed and extended without shoulder giving out.  -       LTG 6 Progress Not Met; Ongoing  -            Time Calculation    PT Goal Re-Cert Due Date 01/27/22  -           User Key  (r) = Recorded By, (t) = Taken By, (c) = Cosigned By    Initials Name Provider Type     Alfonzo Maxwell, PT DPT Physical Therapist                 PT Assessment/Plan     Row Name 01/06/22 0900          PT Assessment    Functional Limitations Limitation in home management; Limitations in community activities; Limitations in functional capacity and performance; Performance in leisure activities; Performance in self-care ADL; Performance in work activities  -     Impairments Range of motion; Pain; Joint mobility  -     Assessment Comments Decreased muscle tension noted with manual treatment this date. Tolerates treatment without complaint.  -     Rehab Potential --  Good/fair. Barrier: chronicity since onset, recurrent shoulder and neck issues  -     Patient/caregiver participated in establishment of treatment plan and goals Yes  -     Patient would benefit from skilled therapy intervention Yes  -            PT Plan    PT Frequency 2x/week  -     Predicted Duration of Therapy Intervention (PT) 10-12 weeks  -     PT Plan Comments MFR, joint mobilization, manual trigger point release, ROM, gentle stretching, strengthening, possible dry needling, MHP  -           User Key  (r) = Recorded By, (t) = Taken By, (c) = Cosigned By    Initials Name Provider Type    Alfonzo Pat, PT DPT Physical Therapist                    Manual Rx (last 36 hours)     Manual Treatments     Row Name 01/06/22 0900             Manual Rx 1    Manual Rx 1 Location recheck  -              Manual Rx 2    Manual Rx 2  Location R UT  -SS      Manual Rx 2 Type MFR  -SS              Manual Rx 3    Manual Rx 3 Location R scalene triangle  -SS      Manual Rx 3 Type MFR  -SS              Manual Rx 4    Manual Rx 4 Location B cervical paraspinals  -SS      Manual Rx 4 Type MFR  -SS              Manual Rx 5    Manual Rx 5 Location R UT, R levator scapula, R scalenes, cervical paraspinals  -SS      Manual Rx 5 Type Manual stretching  -SS              Manual Rx 6    Manual Rx 6 Location lower cervical spine  -SS      Manual Rx 6 Type joint mobilization  -SS            User Key  (r) = Recorded By, (t) = Taken By, (c) = Cosigned By    Initials Name Provider Type    SS Alfonzo Maxwell, PT DPT Physical Therapist                                      Time Calculation:     Start Time: 0945  Stop Time: 1024  Time Calculation (min): 39 min     Therapy Charges for Today     Code Description Service Date Service Provider Modifiers Qty    50091901709 HC PT MANUAL THERAPY EA 15 MIN 1/6/2022 Alfonzo Maxwell, PT DPT GP 3                    Alfonzo Maxwell PT, DPT, CHT  1/6/2022

## 2022-01-11 ENCOUNTER — HOSPITAL ENCOUNTER (OUTPATIENT)
Dept: PHYSICAL THERAPY | Facility: HOSPITAL | Age: 47
Setting detail: THERAPIES SERIES
Discharge: HOME | End: 2022-01-11

## 2022-01-11 DIAGNOSIS — M54.9 MID BACK PAIN: ICD-10-CM

## 2022-01-11 DIAGNOSIS — M25.511 CHRONIC RIGHT SHOULDER PAIN: Primary | ICD-10-CM

## 2022-01-11 DIAGNOSIS — G89.29 CHRONIC RIGHT SHOULDER PAIN: Primary | ICD-10-CM

## 2022-01-11 PROCEDURE — 97140 MANUAL THERAPY 1/> REGIONS: CPT | Performed by: PHYSICAL THERAPIST

## 2022-01-11 NOTE — THERAPY TREATMENT NOTE
"    Outpatient Physical Therapy Ortho Treatment Note  [facility]     Patient Name: Christal Holder  : 1975  MRN: [id number]  Today's Date: 2022      Visit Date: 2022    Attendance: 5/ (27 visits/yr 3 used in )  Subjective Improvement: 0%  Next MD Appt: ??  Recert Date: 22     Therapy Diagnosis: chronic R shoulder and mid-back pain     Visit Dx:    ICD-10-CM ICD-9-CM   1. Chronic right shoulder pain  M25.511 719.41    G89.29 338.29   2. Mid back pain  M54.9 724.5            Past Medical History:   Diagnosis Date   • Anxiety    • Atelectasis    • Chronic pain disorder    • Compression fracture of body of thoracic vertebra (HCC)     T6-T12   • COPD (chronic obstructive pulmonary disease) ([facility])     due to trauma   • Depression    • Diabetes mellitus ([facility])    • Glenoid fracture of shoulder    • Pneumothorax     due to rib fractures   • Rib fracture    • Scapular fracture    • Severe episode of recurrent major depressive disorder, without psychotic features ([facility])         Past Surgical History:   Procedure Laterality Date   • CHOLECYSTECTOMY     • GLENOID OPEN REDUCTION INTERNAL FIXATION Right    • SCAPULA OPEN REDUCTION INTERNAL FIXATION     • TUBAL ABDOMINAL LIGATION          PT Ortho     Row Name 22 1100       Subjective Comments    Subjective Comments \"It feels real sore to the touch\" in the R supraclavicular junction. No real improvement at present.  -SS       Precautions and Contraindications    Precautions vertebral fxs with mal-union  -SS       Subjective Pain    Able to rate subjective pain? yes  -SS    Pre-Treatment Pain Level 5  -SS    Post-Treatment Pain Level 4  -SS       Cervical/Thoracic Special Tests    Cervical/Thoracic Special Tests Comments TTP R cervicothoracic junction. Question elevated R first rib. Increased tone R UT. Elevated R 1st rib.  -SS       Right Upper Ext    Rt Shoulder Abduction AROM 80 deg  -SS    Rt Shoulder Flexion AROM 85 deg  -SS          " User Key  (r) = Recorded By, (t) = Taken By, (c) = Cosigned By    Initials Name Provider Type     Alfonzo Maxwell, PT DPT Physical Therapist                             PT Assessment/Plan     Row Name 01/11/22 1100          PT Assessment    Functional Limitations Limitation in home management; Limitations in community activities; Limitations in functional capacity and performance; Performance in leisure activities; Performance in self-care ADL; Performance in work activities  -     Impairments Range of motion; Pain; Joint mobility  -     Assessment Comments R first rib elevation decreased during manual treatment this date. Cavitation noted at 1st rib during first rib depression. Continuing pain.  -     Rehab Potential --  Good/fair. Barrier: chronicity since onset, recurrent shoulder and neck issues  -     Patient/caregiver participated in establishment of treatment plan and goals Yes  -     Patient would benefit from skilled therapy intervention Yes  -SS            PT Plan    PT Frequency 2x/week  -     Predicted Duration of Therapy Intervention (PT) 10-12 weeks  -     PT Plan Comments Continue POC. DB shoulder shrugs next.  -           User Key  (r) = Recorded By, (t) = Taken By, (c) = Cosigned By    Initials Name Provider Type     Alfonzo aMxwell, PT DPT Physical Therapist                 Modalities     Row Name 01/11/22 1100             Moist Heat    MH Applied Yes  -      Location R UT  -SS      PT Moist Heat Minutes 10  -SS      MH Prior to Rx Yes  -SS            User Key  (r) = Recorded By, (t) = Taken By, (c) = Cosigned By    Initials Name Provider Type    Alfonzo Pat, PT DPT Physical Therapist                           Manual Rx (last 36 hours)     Manual Treatments     Row Name 01/11/22 1100             Manual Rx 1    Manual Rx 1 Location check ROM  -SS              Manual Rx 2    Manual Rx 2 Location MHP -- see Modalities  -              Manual Rx 3     Manual Rx 3 Location R first rib  -SS      Manual Rx 3 Type manual depression  -SS              Manual Rx 4    Manual Rx 4 Location R UT  -SS      Manual Rx 4 Type MFR  -SS              Manual Rx 5    Manual Rx 5 Location B cervical spine  -SS      Manual Rx 5 Type manual distraction  -SS      Manual Rx 5 Grade 1, 2  -SS              Manual Rx 6    Manual Rx 6 Location B cervical paraspinals and R UT  -SS      Manual Rx 6 Type MFR  -SS              Manual Rx 7    Manual Rx 7 Location R UT  -SS      Manual Rx 7 Type dry needle  -            User Key  (r) = Recorded By, (t) = Taken By, (c) = Cosigned By    Initials Name Provider Type    SS Alfonzo Maxwell, PT DPT Physical Therapist                 PT OP Goals     Row Name 01/11/22 1100          PT Short Term Goals    STG Date to Achieve 01/07/22  -     STG 1 Note a >/= 10% subjective improvement.  -     STG 1 Progress Not Met; Ongoing  -     STG 2 Complete QuickDASH and NDI.  -     STG 2 Progress Not Met; Ongoing  -     STG 3 Cervical flexion AROM to be >/= 35 deg.  -     STG 3 Progress Met  -     STG 4 Cervical L lateral flexion AROM to be >/= 20 deg.  -     STG 4 Progress Met  -            Long Term Goals    LTG Date to Achieve 03/11/22  -     LTG 1 Independent with HEP/self-management.  -     LTG 1 Progress Not Met; Ongoing  -     LTG 2 CROM WFLs.  -     LTG 2 Progress Not Met; Ongoing  -     LTG 3 R shoulder flexion AROM to be >/= 120 deg.  -     LTG 3 Progress Not Met; Ongoing  -     LTG 4 NDI to be </ 15/50.  -     LTG 4 Progress Not Met; Ongoing  -     LTG 5 QuickDASH score to be </= 30.  -     LTG 5 Progress Not Met; Ongoing  -     LTG 6 Able to weightbear through R UE with elbow flexed and extended without shoulder giving out.  -     LTG 6 Progress Not Met; Ongoing  -            Time Calculation    PT Goal Re-Cert Due Date 01/27/22  -           User Key  (r) = Recorded By, (t) = Taken By, (c) = Cosigned  By    Initials Name Provider Type    SS Alfonzo Maxwell, PT DPT Physical Therapist                               Time Calculation:   Start Time: 1145  Stop Time: 1230  Time Calculation (min): 45 min      Therapy Charges for Today     Code Description Service Date Service Provider Modifiers Qty    07963989134 HC PT MANUAL THERAPY EA 15 MIN 1/11/2022 Alfonzo Maxwell, PT DPT GP 2    08013772337 HC PT THER SUPP EA 15 MIN 1/11/2022 Alfonzo Maxwell, PT DPT GP 1                    Alfonzo Maxwell, PT, DPT, CHT  1/11/2022

## 2022-01-14 ENCOUNTER — HOSPITAL ENCOUNTER (OUTPATIENT)
Dept: PHYSICAL THERAPY | Facility: HOSPITAL | Age: 47
Setting detail: THERAPIES SERIES
Discharge: HOME | End: 2022-01-14

## 2022-01-14 DIAGNOSIS — M25.511 CHRONIC RIGHT SHOULDER PAIN: Primary | ICD-10-CM

## 2022-01-14 DIAGNOSIS — M54.9 MID BACK PAIN: ICD-10-CM

## 2022-01-14 DIAGNOSIS — G89.29 CHRONIC RIGHT SHOULDER PAIN: Primary | ICD-10-CM

## 2022-01-14 PROCEDURE — 97140 MANUAL THERAPY 1/> REGIONS: CPT | Performed by: PHYSICAL THERAPIST

## 2022-01-14 PROCEDURE — 97110 THERAPEUTIC EXERCISES: CPT | Performed by: PHYSICAL THERAPIST

## 2022-01-14 NOTE — THERAPY TREATMENT NOTE
Outpatient Physical Therapy Ortho Treatment Note  [facility]     Patient Name: Christal Holder  : 1975  MRN: [id number]  Today's Date: 2022      Visit Date: 2022    Attendance:  (27 visits/yr 3 used in )  Subjective Improvement: 0%  Next MD Appt: ??  Recert Date: 22     Therapy Diagnosis: chronic R shoulder and mid-back pain     Visit Dx:    ICD-10-CM ICD-9-CM   1. Chronic right shoulder pain  M25.511 719.41    G89.29 338.29   2. Mid back pain  M54.9 724.5            Past Medical History:   Diagnosis Date   • Anxiety    • Atelectasis    • Chronic pain disorder    • Compression fracture of body of thoracic vertebra (HCC)     T6-T12   • COPD (chronic obstructive pulmonary disease) ([facility])     due to trauma   • Depression    • Diabetes mellitus ([facility])    • Glenoid fracture of shoulder    • Pneumothorax     due to rib fractures   • Rib fracture    • Scapular fracture    • Severe episode of recurrent major depressive disorder, without psychotic features ([facility])         Past Surgical History:   Procedure Laterality Date   • CHOLECYSTECTOMY     • GLENOID OPEN REDUCTION INTERNAL FIXATION Right    • SCAPULA OPEN REDUCTION INTERNAL FIXATION     • TUBAL ABDOMINAL LIGATION          PT Ortho     Row Name 22 0900       Subjective Comments    Subjective Comments Pain midback, R shoulder, and R hip. R supraclavicular region somewhat less painful today. Hurt day after last therapy session then has been a bit better.  -SS       Precautions and Contraindications    Precautions vertebral fxs with mal-union  -SS       Subjective Pain    Able to rate subjective pain? yes  -SS    Pre-Treatment Pain Level 4  -SS    Post-Treatment Pain Level --  2-3  -SS       Cervical/Thoracic Special Tests    Cervical/Thoracic Special Tests Comments Increased tone with TTP R UT in cervical spine, R iliocostalis thoracis, R rhomboids. Increased tone R SCM.  -SS          User Key  (r) = Recorded By, (t) = Taken  By, (c) = Cosigned By    Initials Name Provider Type     Alfonzo Maxwell, PT DPT Physical Therapist                             PT Assessment/Plan     Row Name 01/14/22 0900          PT Assessment    Functional Limitations Limitation in home management; Limitations in community activities; Limitations in functional capacity and performance; Performance in leisure activities; Performance in self-care ADL; Performance in work activities  -     Impairments Range of motion; Pain; Joint mobility  -     Assessment Comments Cavitation in mid-thoracic region with joint mobilization. Decreased muscle tension and pain with treatment this date. Good effort with therex.  -     Rehab Potential --  Good/fair. Barrier: chronicity since onset, recurrent shoulder and neck issues  -     Patient/caregiver participated in establishment of treatment plan and goals Yes  -SS     Patient would benefit from skilled therapy intervention Yes  -SS            PT Plan    PT Frequency 2x/week  -     Predicted Duration of Therapy Intervention (PT) 10-12 weeks  -     PT Plan Comments Continue POC. Increase reps of t-band exercises next.  -           User Key  (r) = Recorded By, (t) = Taken By, (c) = Cosigned By    Initials Name Provider Type     Alfonzo Maxwell, PT DPT Physical Therapist                 Modalities     Row Name 01/14/22 0900             Moist Heat    MH Applied Yes  -SS      Location neck & trunk  -      PT Moist Heat Minutes 12  -SS      MH Prior to Rx Yes  -SS            User Key  (r) = Recorded By, (t) = Taken By, (c) = Cosigned By    Initials Name Provider Type    Alfonzo Pat, PT DPT Physical Therapist               OP Exercises     Row Name 01/14/22 0900             Subjective Comments    Subjective Comments Pain midback, R shoulder, and R hip. R supraclavicular region somewhat less painful today. Hurt day after last therapy session then has been a bit better.  -               Subjective Pain    Able to rate subjective pain? yes  -SS      Pre-Treatment Pain Level 4  -SS      Post-Treatment Pain Level --  2-3  -SS              Exercise 1    Exercise Name 1 DB shoulder shrug and retract  -SS      Cueing 1 Verbal; Demo  -SS      Sets 1 1  -SS      Reps 1 15  -SS      Time 1 2# each hand  -SS              Exercise 2    Exercise Name 2 T-band isometric scapular hold while therapist steps back  -SS      Cueing 2 Verbal  -SS      Sets 2 2  -SS      Reps 2 10  -SS      Additional Comments yellow t-band; 2 steps  -SS              Exercise 3    Exercise Name 3 T-band isometric shoulder ER/IR hold while therapist steps away - B  -SS      Cueing 3 Verbal  -SS      Sets 3 1  -SS      Reps 3 10 each  -SS      Additional Comments yellow t-band; 2 steps  -SS              Exercise 4    Exercise Name 4 T-band isometric shoulder flexion hold while therapist steps away - R  -SS      Cueing 4 Verbal  -SS      Sets 4 1  -SS      Reps 4 10  -SS      Additional Comments yellow t-band; 2 steps  -SS              Exercise 5    Exercise Name 5 T-band isometric shoulder abduction hold while therapist steps away - R  -SS      Cueing 5 Verbal  -SS      Sets 5 1  -SS      Reps 5 10  -SS      Additional Comments yellow t-band; 2 steps  -SS              Exercise 6    Exercise Name 6 Manual R UT stretch  -SS      Cueing 6 Verbal; Tactile  -SS      Sets 6 3  -SS      Time 6 30 sec hold  -SS              Exercise 7    Exercise Name 7 Manual R levator scapula stretch  -SS      Cueing 7 Verbal; Tactile  -SS      Sets 7 3  -SS      Time 7 30 sec hold  -SS              Exercise 8    Exercise Name 8 see Manual  -SS            User Key  (r) = Recorded By, (t) = Taken By, (c) = Cosigned By    Initials Name Provider Type    Alfonzo Pat PT DPT Physical Therapist                         Manual Rx (last 36 hours)     Manual Treatments     Row Name 01/14/22 0900             Manual Rx 1    Manual Rx 1 Location R SCM  -SS       Manual Rx 1 Type MFR  -SS              Manual Rx 2    Manual Rx 2 Location R UT superiorly  -SS      Manual Rx 2 Type MFR  -SS              Manual Rx 3    Manual Rx 3 Location R iliocostalis thoracis and rhomboids  -SS      Manual Rx 3 Type MFR  -SS              Manual Rx 4    Manual Rx 4 Location mid thoracic spine  -      Manual Rx 4 Type seated joint mobilization  -SS      Manual Rx 4 Grade 2/3  -            User Key  (r) = Recorded By, (t) = Taken By, (c) = Cosigned By    Initials Name Provider Type     Alfonzo Maxwell, PT DPT Physical Therapist                 PT OP Goals     Row Name 01/14/22 0900          PT Short Term Goals    STG Date to Achieve 01/27/22  -     STG 1 Note a >/= 10% subjective improvement.  -     STG 1 Progress Not Met; Ongoing  -     STG 2 Complete QuickDASH and NDI.  -     STG 2 Progress Not Met; Ongoing  -     STG 3 Cervical flexion AROM to be >/= 35 deg.  -     STG 3 Progress Met  -     STG 4 Cervical L lateral flexion AROM to be >/= 20 deg.  -     STG 4 Progress Met  -            Long Term Goals    LTG Date to Achieve 03/11/22  -     LTG 1 Independent with HEP/self-management.  -     LTG 1 Progress Not Met; Ongoing  -     LTG 2 CROM WFLs.  -     LTG 2 Progress Not Met; Ongoing  -     LTG 3 R shoulder flexion AROM to be >/= 120 deg.  -     LTG 3 Progress Not Met; Ongoing  -     LTG 4 NDI to be </ 15/50.  -     LTG 4 Progress Not Met; Ongoing  -     LTG 5 QuickDASH score to be </= 30.  -     LTG 5 Progress Not Met; Ongoing  -     LTG 6 Able to weightbear through R UE with elbow flexed and extended without shoulder giving out.  -     LTG 6 Progress Not Met; Ongoing  -            Time Calculation    PT Goal Re-Cert Due Date 01/27/22  -           User Key  (r) = Recorded By, (t) = Taken By, (c) = Cosigned By    Initials Name Provider Type    Alfonzo Pat, PT DPT Physical Therapist                               Time  Calculation:   Start Time: 0936  Stop Time: 1027  Time Calculation (min): 51 min      Therapy Charges for Today     Code Description Service Date Service Provider Modifiers Qty    39156649101 HC PT MANUAL THERAPY EA 15 MIN 1/14/2022 Alfonzo Maxwell, PT DPT GP 1    11054663921 HC PT THER PROC EA 15 MIN 1/14/2022 Alfonzo Maxwell, PT DPT GP 2    44843808152 HC PT THER SUPP EA 15 MIN 1/14/2022 Alfonzo Maxwell, PT DPT GP 1                    Alfonzo Maxwell, PT, DPT, CHT  1/14/2022

## 2022-01-18 ENCOUNTER — APPOINTMENT (OUTPATIENT)
Dept: PHYSICAL THERAPY | Facility: HOSPITAL | Age: 47
End: 2022-01-18

## 2022-01-21 ENCOUNTER — HOSPITAL ENCOUNTER (OUTPATIENT)
Dept: PHYSICAL THERAPY | Facility: HOSPITAL | Age: 47
Setting detail: THERAPIES SERIES
Discharge: HOME | End: 2022-01-21

## 2022-01-21 DIAGNOSIS — M54.9 MID BACK PAIN: ICD-10-CM

## 2022-01-21 DIAGNOSIS — G89.29 CHRONIC RIGHT SHOULDER PAIN: Primary | ICD-10-CM

## 2022-01-21 DIAGNOSIS — M25.511 CHRONIC RIGHT SHOULDER PAIN: Primary | ICD-10-CM

## 2022-01-21 PROCEDURE — 97110 THERAPEUTIC EXERCISES: CPT | Performed by: PHYSICAL THERAPIST

## 2022-01-22 NOTE — THERAPY TREATMENT NOTE
Outpatient Physical Therapy Ortho Treatment Note  [facility]     Patient Name: Christal Holder  : 1975  MRN: [id number]  Today's Date: 2022      Visit Date: 2022    Attendance: 7 (27 visits/yr 3 used in )  Subjective Improvement: 10%  Next MD Appt: PRN  Recert Date: 22     Therapy Diagnosis: chronic R shoulder and mid-back pain     Visit Dx:    ICD-10-CM ICD-9-CM   1. Chronic right shoulder pain  M25.511 719.41    G89.29 338.29   2. Mid back pain  M54.9 724.5            Past Medical History:   Diagnosis Date   • Anxiety    • Atelectasis    • Chronic pain disorder    • Compression fracture of body of thoracic vertebra (HCC)     T6-T12   • COPD (chronic obstructive pulmonary disease) ([facility])     due to trauma   • Depression    • Diabetes mellitus ([facility])    • Glenoid fracture of shoulder    • Pneumothorax     due to rib fractures   • Rib fracture    • Scapular fracture    • Severe episode of recurrent major depressive disorder, without psychotic features ([facility])         Past Surgical History:   Procedure Laterality Date   • CHOLECYSTECTOMY     • GLENOID OPEN REDUCTION INTERNAL FIXATION Right    • SCAPULA OPEN REDUCTION INTERNAL FIXATION     • TUBAL ABDOMINAL LIGATION                          PT Assessment/Plan     Row Name 22 1100          PT Assessment    Functional Limitations Limitation in home management; Limitations in community activities; Limitations in functional capacity and performance; Performance in leisure activities; Performance in self-care ADL; Performance in work activities  -     Impairments Range of motion; Pain; Joint mobility  -     Assessment Comments Muscle soreness and fatigue post-treatment. Good effort. Very tight R supraclavicular region.  -     Rehab Potential --  Good/fair. Barrier: chronicity since onset, recurrent shoulder and neck issues  -     Patient/caregiver participated in establishment of treatment plan and goals Yes  -SS      "Patient would benefit from skilled therapy intervention Yes  -SS            PT Plan    PT Frequency 2x/week  -SS     Predicted Duration of Therapy Intervention (PT) 10-12 weeks  -     PT Plan Comments Recheck next week. Mirror therapy to R shoulder.  -SS           User Key  (r) = Recorded By, (t) = Taken By, (c) = Cosigned By    Initials Name Provider Type    SS Alfonzo Maxwell, PT DPT Physical Therapist                   OP Exercises     Row Name 01/21/22 1100             Subjective Comments    Subjective Comments Pain R shoulder and back. \"My back feels like it's burning.\" Feels it from mid-thoracic spine down to lumbosacral junction. Pain 6/10 when woke up. \"Hot, hot shower\" helped decrease her pain. Felt \"fine\" after last therapy session. R UT feels spasmed. 10% subjective improvement.  -SS              Subjective Pain    Able to rate subjective pain? yes  -SS      Pre-Treatment Pain Level 3  -SS      Post-Treatment Pain Level 4  -SS              Exercise 1    Exercise Name 1 Midback prayer stretch  -SS      Cueing 1 Verbal; Demo  -SS      Sets 1 3  -SS      Time 1 30 sec hold  -SS              Exercise 2    Exercise Name 2 T-band isometric scapular hold while therapist steps back  -SS      Cueing 2 Verbal  -SS      Sets 2 3  -SS      Reps 2 10  -SS      Additional Comments yellow t-band; 2 steps  -SS              Exercise 3    Exercise Name 3 T-band isometric shoulder ER/IR hold while therapist steps away - B  -SS      Cueing 3 Verbal  -SS      Sets 3 2  -SS      Reps 3 10 each  -SS      Additional Comments yellow t-band; 2 steps  -SS              Exercise 4    Exercise Name 4 T-band isometric shoulder flexion hold while therapist steps away - R  -SS      Cueing 4 Verbal  -SS      Sets 4 2  -SS      Reps 4 10  -SS      Additional Comments yellow t-band; 2 steps  -SS              Exercise 5    Exercise Name 5 T-band isometric shoulder abduction hold while therapist steps away - R  -SS      Cueing 5 " Verbal  -SS      Sets 5 2  -SS      Reps 5 10  -SS      Additional Comments yellow t-band; 2 steps  -SS              Exercise 6    Exercise Name 6 T-band isometric shoulder extension hold while therapist steps away - R  -SS      Cueing 6 Verbal  -SS      Sets 6 2  -SS      Reps 6 10  -SS      Additional Comments yellow t-band; 2 steps  -SS              Exercise 7    Exercise Name 7 Manual compression R 1st rib/scalene  -SS      Cueing 7 Tactile  -SS      Time 7 2 mins  -SS              Exercise 8    Exercise Name 8 Seated elephant stretch with L rotation  -SS      Cueing 8 Verbal  -SS      Sets 8 3  -SS      Time 8 30 sec hold  -SS            User Key  (r) = Recorded By, (t) = Taken By, (c) = Cosigned By    Initials Name Provider Type    SS Alfonzo Maxwell, PT DPT Physical Therapist                              PT OP Goals     Row Name 01/21/22 1100          PT Short Term Goals    STG Date to Achieve 01/27/22  -     STG 1 Note a >/= 10% subjective improvement.  -     STG 1 Progress Not Met; Ongoing  -     STG 2 Complete QuickDASH and NDI.  -     STG 2 Progress Not Met; Ongoing  -     STG 3 Cervical flexion AROM to be >/= 35 deg.  -     STG 3 Progress Met  -     STG 4 Cervical L lateral flexion AROM to be >/= 20 deg.  -     STG 4 Progress Met  -            Long Term Goals    LTG Date to Achieve 03/11/22  -     LTG 1 Independent with HEP/self-management.  -     LTG 1 Progress Not Met; Ongoing  -     LTG 2 CROM WFLs.  -     LTG 2 Progress Not Met; Ongoing  -     LTG 3 R shoulder flexion AROM to be >/= 120 deg.  -     LTG 3 Progress Not Met; Ongoing  -     LTG 4 NDI to be </ 15/50.  -     LTG 4 Progress Not Met; Ongoing  -     LTG 5 QuickDASH score to be </= 30.  -     LTG 5 Progress Not Met; Ongoing  -     LTG 6 Able to weightbear through R UE with elbow flexed and extended without shoulder giving out.  -     LTG 6 Progress Not Met; Ongoing  -            Time  Calculation    PT Goal Re-Cert Due Date 01/27/22  -           User Key  (r) = Recorded By, (t) = Taken By, (c) = Cosigned By    Initials Name Provider Type     Alfonzo Maxwell, PT DPT Physical Therapist                Therapy Education  Education Details: midback stretch with rotation  Given: HEP  Program: Progressed  How Provided: Verbal  Provided to: Patient  Level of Understanding: Verbalized, Demonstrated              Time Calculation:   Start Time: 1105  Stop Time: 1147  Time Calculation (min): 42 min  Therapy Charges for Today     Code Description Service Date Service Provider Modifiers Qty    99178055485 HC PT THER PROC EA 15 MIN 1/21/2022 Alfonzo Maxwell, PT DPT GP 3                    Alfonzo Maxwell, PT, DPT, CHT  1/21/2022

## 2022-01-25 ENCOUNTER — HOSPITAL ENCOUNTER (OUTPATIENT)
Dept: PHYSICAL THERAPY | Facility: HOSPITAL | Age: 47
Setting detail: THERAPIES SERIES
Discharge: HOME | End: 2022-01-25

## 2022-01-25 DIAGNOSIS — M54.9 MID BACK PAIN: ICD-10-CM

## 2022-01-25 DIAGNOSIS — G89.29 CHRONIC RIGHT SHOULDER PAIN: Primary | ICD-10-CM

## 2022-01-25 DIAGNOSIS — M25.511 CHRONIC RIGHT SHOULDER PAIN: Primary | ICD-10-CM

## 2022-01-25 PROCEDURE — 97110 THERAPEUTIC EXERCISES: CPT | Performed by: PHYSICAL THERAPIST

## 2022-01-25 PROCEDURE — 97140 MANUAL THERAPY 1/> REGIONS: CPT | Performed by: PHYSICAL THERAPIST

## 2022-01-25 NOTE — THERAPY TREATMENT NOTE
Outpatient Physical Therapy Ortho Treatment Note  [facility]     Patient Name: Christal Holder  : 1975  MRN: [id number]  Today's Date: 2022      Visit Date: 2022    Attendance:  (27 visits/yr 3 used in )  Subjective Improvement: 10%  Next MD Appt: PRN  Recert Date: 22     Therapy Diagnosis: chronic R shoulder and mid-back pain     Visit Dx:    ICD-10-CM ICD-9-CM   1. Chronic right shoulder pain  M25.511 719.41    G89.29 338.29   2. Mid back pain  M54.9 724.5            Past Medical History:   Diagnosis Date   • Anxiety    • Atelectasis    • Chronic pain disorder    • Compression fracture of body of thoracic vertebra (HCC)     T6-T12   • COPD (chronic obstructive pulmonary disease) ([facility])     due to trauma   • Depression    • Diabetes mellitus ([facility])    • Glenoid fracture of shoulder    • Pneumothorax     due to rib fractures   • Rib fracture    • Scapular fracture    • Severe episode of recurrent major depressive disorder, without psychotic features ([facility])         Past Surgical History:   Procedure Laterality Date   • CHOLECYSTECTOMY     • GLENOID OPEN REDUCTION INTERNAL FIXATION Right    • SCAPULA OPEN REDUCTION INTERNAL FIXATION     • TUBAL ABDOMINAL LIGATION          PT Ortho     Row Name 22 1100       Subjective Comments    Subjective Comments Woke up this morning feeling stiff in the R UT region and pain in the same with L rotation of cervical spine. Has been wearing a heating pad on R shoulder all morning.  -SS       Precautions and Contraindications    Precautions vertebral fxs with mal-union  -SS       Subjective Pain    Able to rate subjective pain? yes  -SS    Pre-Treatment Pain Level 4  -SS    Post-Treatment Pain Level 4  -SS       Cervical/Thoracic Special Tests    Cervical/Thoracic Special Tests Comments Increased tone R SCM, levator scapula, and UT. TTP supraclavicular mass.  -SS       Right Upper Ext    Rt Shoulder Abduction AROM 60 deg  -SS    Rt  Shoulder Extension AROM 18 deg  -SS    Rt Shoulder Flexion AROM 70 deg  -          User Key  (r) = Recorded By, (t) = Taken By, (c) = Cosigned By    Initials Name Provider Type     Alfonzo Maxwell, PT DPT Physical Therapist                             PT Assessment/Plan     Row Name 01/25/22 1100          PT Assessment    Functional Limitations Limitation in home management; Limitations in community activities; Limitations in functional capacity and performance; Performance in leisure activities; Performance in self-care ADL; Performance in work activities  -     Impairments Range of motion; Pain; Joint mobility  -     Assessment Comments Muscle relaxation with manual cervical distraction. Decreased cervical muscle tension post-treatment.  -     Rehab Potential --  Good/fair. Barrier: chronicity since onset, recurrent shoulder and neck issues  -     Patient/caregiver participated in establishment of treatment plan and goals Yes  -     Patient would benefit from skilled therapy intervention Yes  -SS            PT Plan    PT Frequency 2x/week  -     Predicted Duration of Therapy Intervention (PT) 10-12 weeks  -     PT Plan Comments Continue POC. Recheck next.  -           User Key  (r) = Recorded By, (t) = Taken By, (c) = Cosigned By    Initials Name Provider Type     Alfonzo Maxwell, PT DPT Physical Therapist                   OP Exercises     Row Name 01/25/22 1100             Subjective Comments    Subjective Comments Woke up this morning feeling stiff in the R UT region and pain in the same with L rotation of cervical spine. Has been wearing a heating pad on R shoulder all morning.  -              Subjective Pain    Able to rate subjective pain? yes  -      Pre-Treatment Pain Level 4  -SS      Post-Treatment Pain Level 4  -SS              Total Minutes    81478 - PT Manual Therapy Minutes 38  -SS              Exercise 1    Exercise Name 1 check shoulder AROM  -SS               Exercise 2    Exercise Name 2 Mirror therapy - visualization  -SS      Cueing 2 Verbal  -SS      Time 2 3 mins  -SS              Exercise 3    Exercise Name 3 Mirror therapy - L shoulder flexion with elbow flexed and imagining R shoulder moving  -SS      Cueing 3 Verbal  -SS      Sets 3 1  -SS      Reps 3 20  -SS              Exercise 4    Exercise Name 4 Mirror therapy - L shoulder ER/IR in neutral and imagining R shoulder moving  -SS      Cueing 4 Verbal; Demo  -SS      Sets 4 2  -SS      Reps 4 10  -SS              Exercise 5    Exercise Name 5 Mirror therapy - L touching forehead and imagining R shoulder moving  -SS      Cueing 5 Verbal  -SS      Sets 5 1  -SS      Reps 5 15  -SS              Exercise 6    Exercise Name 6 Mirror therapy - B shoulder shrug  -SS      Cueing 6 Verbal  -SS      Sets 6 1  -SS      Reps 6 15  -SS              Exercise 7    Exercise Name 7 see Manual  -SS            User Key  (r) = Recorded By, (t) = Taken By, (c) = Cosigned By    Initials Name Provider Type    Alfonzo Pat, PT DPT Physical Therapist                         Manual Rx (last 36 hours)     Manual Treatments     Row Name 01/25/22 1100             Total Minutes    88731 - PT Manual Therapy Minutes 29  -SS              Manual Rx 1    Manual Rx 1 Location cervical spine  -SS      Manual Rx 1 Type manual distraction  -SS      Manual Rx 1 Grade 1 & 2  -SS              Manual Rx 2    Manual Rx 2 Location R SCM  -SS      Manual Rx 2 Type MFR  -SS              Manual Rx 3    Manual Rx 3 Location R levator scapula  -SS      Manual Rx 3 Type MFR  -SS              Manual Rx 4    Manual Rx 4 Location R UT  -SS      Manual Rx 4 Type MFR  -SS            User Key  (r) = Recorded By, (t) = Taken By, (c) = Cosigned By    Initials Name Provider Type    Alfonzo Pat, PT DPT Physical Therapist                 PT OP Goals     Row Name 01/25/22 1100          PT Short Term Goals    STG Date to Achieve 01/27/22  -SS      STG 1 Note a >/= 10% subjective improvement.  -     STG 1 Progress Met  -     STG 2 Complete QuickDASH and NDI.  -SS     STG 2 Progress Not Met; Ongoing  -     STG 3 Cervical flexion AROM to be >/= 35 deg.  -     STG 3 Progress Met  -     STG 4 Cervical L lateral flexion AROM to be >/= 20 deg.  -     STG 4 Progress Met  -            Long Term Goals    LTG Date to Achieve 03/11/22  -     LTG 1 Independent with HEP/self-management.  -     LTG 1 Progress Not Met; Ongoing  -     LTG 2 CROM WFLs.  -     LTG 2 Progress Not Met; Ongoing  -     LTG 3 R shoulder flexion AROM to be >/= 120 deg.  -     LTG 3 Progress Not Met; Ongoing  -     LTG 4 NDI to be </ 15/50.  -     LTG 4 Progress Not Met; Ongoing  -     LTG 5 QuickDASH score to be </= 30.  -     LTG 5 Progress Not Met; Ongoing  -     LTG 6 Able to weightbear through R UE with elbow flexed and extended without shoulder giving out.  -     LTG 6 Progress Not Met; Ongoing  -            Time Calculation    PT Goal Re-Cert Due Date 01/27/22  -           User Key  (r) = Recorded By, (t) = Taken By, (c) = Cosigned By    Initials Name Provider Type     Alfonzo Maxwell, PT DPT Physical Therapist                               Time Calculation:   Start Time: 1145  Stop Time: 1226  Time Calculation (min): 41 min  Timed Charges  35557 - PT Therapeutic Exercise Minutes: 12  76770 - PT Manual Therapy Minutes: 29  Total Minutes  Timed Charges Total Minutes: 41   Total Minutes: 41  Therapy Charges for Today     Code Description Service Date Service Provider Modifiers Qty    21075782157 HC PT MANUAL THERAPY EA 15 MIN 1/25/2022 Alfonzo Maxwell, PT DPT GP 2    72470579847 HC PT THER PROC EA 15 MIN 1/25/2022 Alfonzo Maxwell, PT DPT GP 1                    Alfonzo Maxwell, PT, DPT, CHT  1/25/2022

## 2022-01-28 ENCOUNTER — HOSPITAL ENCOUNTER (OUTPATIENT)
Dept: PHYSICAL THERAPY | Facility: HOSPITAL | Age: 47
Setting detail: THERAPIES SERIES
Discharge: HOME | End: 2022-01-28

## 2022-01-28 DIAGNOSIS — G89.29 CHRONIC RIGHT SHOULDER PAIN: Primary | ICD-10-CM

## 2022-01-28 DIAGNOSIS — M25.511 CHRONIC RIGHT SHOULDER PAIN: Primary | ICD-10-CM

## 2022-01-28 DIAGNOSIS — M54.9 MID BACK PAIN: ICD-10-CM

## 2022-01-28 PROCEDURE — 97140 MANUAL THERAPY 1/> REGIONS: CPT | Performed by: PHYSICAL THERAPIST

## 2022-01-28 NOTE — THERAPY PROGRESS REPORT/RE-CERT
Outpatient Physical Therapy Ortho Progress Note  [facility]     Patient Name: Christal Holder  : 1975  MRN: [id number]  Today's Date: 2022      Visit Date: 2022    Attendance: 9/10 (27 visits/yr 3 used in )  Subjective Improvement: ??  Next MD Appt: PRN  Recert Date: 22     Therapy Diagnosis: chronic R shoulder and mid-back pain     Changes in Medications: none noted  Changes in MD Orders: none noted  Number of Work Days Lost: n/a       Past Medical History:   Diagnosis Date   • Anxiety    • Atelectasis    • Chronic pain disorder    • Compression fracture of body of thoracic vertebra (HCC)     T6-T12   • COPD (chronic obstructive pulmonary disease) (Formerly [facility])     due to trauma   • Depression    • Diabetes mellitus (Formerly [facility])    • Glenoid fracture of shoulder    • Pneumothorax     due to rib fractures   • Rib fracture    • Scapular fracture    • Severe episode of recurrent major depressive disorder, without psychotic features (Formerly [facility])         Past Surgical History:   Procedure Laterality Date   • CHOLECYSTECTOMY     • GLENOID OPEN REDUCTION INTERNAL FIXATION Right    • SCAPULA OPEN REDUCTION INTERNAL FIXATION     • TUBAL ABDOMINAL LIGATION         Visit Dx:     ICD-10-CM ICD-9-CM   1. Chronic right shoulder pain  M25.511 719.41    G89.29 338.29   2. Mid back pain  M54.9 724.5              PT Ortho     Row Name 22 1100       Subjective Comments    Subjective Comments Tight, stiff, and sore today. 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 Therapy helps the day of treatment, but is not having a lasting effect. Saw pulmonologist on 22 and states that her lungs are clear.  -SS       Precautions and Contraindications    Precautions vertebral fxs with mal-union  -SS       Subjective Pain    Able to rate subjective pain? yes  -SS    Pre-Treatment Pain Level 5  -SS    Post-Treatment Pain Level 5  -SS       Posture/Observations    Posture/Observations Comments Forward head,  rounded shoulders posture.  -SS       Cervical/Thoracic Special Tests    Cervical/Thoracic Special Tests Comments Increased tone with trigger point R scalenes. TTP R scalenes and rectus capitus. Hypertrophy R UT.  -SS       Head/Neck/Trunk    Neck Extension AROM 50 deg  -SS    Neck Flexion AROM 40 deg  -SS    Neck Lt Lateral Flexion AROM 22 deg  -SS    Neck Rt Lateral Flexion AROM 15 deg  -SS    Neck Lt Rotation AROM 25 deg; pain R  -SS    Neck Rt Rotation AROM 40 deg; pain R  -SS       Right Upper Ext    Rt Shoulder Abduction AROM 90 deg  -SS    Rt Shoulder Extension AROM 25 deg  -SS    Rt Shoulder Flexion AROM 90 deg  -SS    Rt Shoulder External Rotation AROM 27 deg; end-range pain  -SS          User Key  (r) = Recorded By, (t) = Taken By, (c) = Cosigned By    Initials Name Provider Type    SS Alfonzo Maxwell, PT DPT Physical Therapist                            Therapy Education  Education Details: findings, plan, consult PCP  How Provided: Verbal  Provided to: Patient  Level of Understanding: Verbalized      PT OP Goals     Row Name 01/28/22 1100          PT Short Term Goals    STG Date to Achieve 01/27/22  -     STG 1 Note a >/= 10% subjective improvement.  -     STG 1 Progress Met  -     STG 2 Complete QuickDASH and NDI.  -     STG 2 Progress Not Met; Ongoing  -     STG 3 Cervical flexion AROM to be >/= 35 deg.  -     STG 3 Progress Met  -     STG 4 Cervical L lateral flexion AROM to be >/= 20 deg.  -     STG 4 Progress Met  -            Long Term Goals    LTG Date to Achieve 03/11/22  -     LTG 1 Independent with HEP/self-management.  -     LTG 1 Progress Not Met; Ongoing  -     LTG 2 CROM WFLs.  -     LTG 2 Progress Not Met; Ongoing  -     LTG 3 R shoulder flexion AROM to be >/= 120 deg.  -     LTG 3 Progress Not Met; Ongoing  -     LTG 4 NDI to be </ 15/50.  -     LTG 4 Progress Not Met; Ongoing  -     LTG 5 QuickDASH score to be </= 30.  -     LTG 5 Progress Not  Met; Ongoing  -     LTG 6 Able to weightbear through R UE with elbow flexed and extended without shoulder giving out.  -     LTG 6 Progress Not Met; Ongoing  -            Time Calculation    PT Goal Re-Cert Due Date 02/18/22  -           User Key  (r) = Recorded By, (t) = Taken By, (c) = Cosigned By    Initials Name Provider Type     Alfonzo Maxwell, PT DPT Physical Therapist                 PT Assessment/Plan     Row Name 01/28/22 1100          PT Assessment    Functional Limitations Limitation in home management; Limitations in community activities; Limitations in functional capacity and performance; Performance in leisure activities; Performance in self-care ADL; Performance in work activities  -     Impairments Range of motion; Pain; Joint mobility  -     Assessment Comments Reassessment completed this date. Paraesthesia in R UE when performing active shoulder flexion during MFR. Paraesthesia improving afterward. I had Fariha Arguello, PT, consult with me regarding patient. Seems that R scalenes are very tight, but still unable to tell full nature of supraclavicular swelling. Shoulder AROM into flexion, extension, and abduction are unchanged since 12/17/21 recert. R shoulder ER improved. Cervical flexion and L lateral flexion improved compared to 12/17/21, but R lateral flexion and B rotation are decreased. Patient does not appear to be responding to P.T. in a significant, lasting way at this time. Patient agrees with assessment that progress is usually more notable at this point in treatment in the past than now. I have recommended that she consult further with her PCP.  -     Rehab Potential --  Good/fair. Barrier: chronicity since onset, recurrent shoulder and neck issues  -     Patient/caregiver participated in establishment of treatment plan and goals Yes  -            PT Plan    PT Plan Comments Hold P.T. at this time.  -           User Key  (r) = Recorded By, (t) = Taken By,  (c) = Cosigned By    Initials Name Provider Type    Alfonzo Pat, PT DPT Physical Therapist                    Manual Rx (last 36 hours)     Manual Treatments     Row Name 01/28/22 1100             Manual Rx 1    Manual Rx 1 Location R scalenes  -SS      Manual Rx 1 Type MFR  -SS              Manual Rx 2    Manual Rx 2 Location recheck  -SS              Manual Rx 3    Manual Rx 3 Location cervical spine  -SS      Manual Rx 3 Type manual distraction  -SS      Manual Rx 3 Grade 2  -SS              Manual Rx 4    Manual Rx 4 Location suboccipital muscles  -SS      Manual Rx 4 Type MFR  -SS              Manual Rx 5    Manual Rx 5 Location R scalene/SCM  -SS      Manual Rx 5 Type MFR  -SS      Manual Rx 5 Duration active shoulder flexion x5 reps during scalene MFR  -SS            User Key  (r) = Recorded By, (t) = Taken By, (c) = Cosigned By    Initials Name Provider Type    Alfonzo Pat, PT DPT Physical Therapist                                      Time Calculation:     Start Time: 1108  Stop Time: 1154  Time Calculation (min): 46 min     Therapy Charges for Today     Code Description Service Date Service Provider Modifiers Qty    [id number] HC PT MANUAL THERAPY EA 15 MIN 1/28/2022 Alfonzo Maxwell, PT DPT GP 3                    Alfonzo Maxwell, PT, DPT, CHT  1/28/2022

## 2022-01-31 ENCOUNTER — APPOINTMENT (OUTPATIENT)
Dept: PHYSICAL THERAPY | Facility: HOSPITAL | Age: 47
End: 2022-01-31

## 2022-02-07 ENCOUNTER — APPOINTMENT (OUTPATIENT)
Dept: PHYSICAL THERAPY | Facility: HOSPITAL | Age: 47
End: 2022-02-07

## 2022-02-10 ENCOUNTER — APPOINTMENT (OUTPATIENT)
Dept: PHYSICAL THERAPY | Facility: HOSPITAL | Age: 47
End: 2022-02-10